# Patient Record
Sex: MALE | Race: WHITE | NOT HISPANIC OR LATINO | Employment: UNEMPLOYED | URBAN - METROPOLITAN AREA
[De-identification: names, ages, dates, MRNs, and addresses within clinical notes are randomized per-mention and may not be internally consistent; named-entity substitution may affect disease eponyms.]

---

## 2017-01-23 ENCOUNTER — COMMUNICATION - HEALTHEAST (OUTPATIENT)
Dept: FAMILY MEDICINE | Facility: CLINIC | Age: 52
End: 2017-01-23

## 2017-01-23 DIAGNOSIS — E11.9 TYPE 2 DIABETES MELLITUS (H): ICD-10-CM

## 2017-02-02 ENCOUNTER — AMBULATORY - HEALTHEAST (OUTPATIENT)
Dept: FAMILY MEDICINE | Facility: CLINIC | Age: 52
End: 2017-02-02

## 2017-08-13 ENCOUNTER — COMMUNICATION - HEALTHEAST (OUTPATIENT)
Dept: FAMILY MEDICINE | Facility: CLINIC | Age: 52
End: 2017-08-13

## 2017-08-13 DIAGNOSIS — I10 UNSPECIFIED ESSENTIAL HYPERTENSION: ICD-10-CM

## 2017-08-13 DIAGNOSIS — E11.9 TYPE 2 DIABETES MELLITUS (H): ICD-10-CM

## 2017-09-06 ENCOUNTER — COMMUNICATION - HEALTHEAST (OUTPATIENT)
Dept: FAMILY MEDICINE | Facility: CLINIC | Age: 52
End: 2017-09-06

## 2017-09-06 ENCOUNTER — AMBULATORY - HEALTHEAST (OUTPATIENT)
Dept: FAMILY MEDICINE | Facility: CLINIC | Age: 52
End: 2017-09-06

## 2017-09-06 DIAGNOSIS — E11.9 TYPE 2 DIABETES MELLITUS (H): ICD-10-CM

## 2017-09-17 ENCOUNTER — COMMUNICATION - HEALTHEAST (OUTPATIENT)
Dept: FAMILY MEDICINE | Facility: CLINIC | Age: 52
End: 2017-09-17

## 2017-09-17 DIAGNOSIS — I10 UNSPECIFIED ESSENTIAL HYPERTENSION: ICD-10-CM

## 2017-09-17 DIAGNOSIS — E11.9 TYPE 2 DIABETES MELLITUS (H): ICD-10-CM

## 2017-09-30 ENCOUNTER — COMMUNICATION - HEALTHEAST (OUTPATIENT)
Dept: FAMILY MEDICINE | Facility: CLINIC | Age: 52
End: 2017-09-30

## 2017-09-30 DIAGNOSIS — E11.9 DIABETES MELLITUS (H): ICD-10-CM

## 2017-10-24 ENCOUNTER — COMMUNICATION - HEALTHEAST (OUTPATIENT)
Dept: FAMILY MEDICINE | Facility: CLINIC | Age: 52
End: 2017-10-24

## 2017-10-24 DIAGNOSIS — E11.9 TYPE 2 DIABETES MELLITUS (H): ICD-10-CM

## 2017-11-03 ENCOUNTER — COMMUNICATION - HEALTHEAST (OUTPATIENT)
Dept: FAMILY MEDICINE | Facility: CLINIC | Age: 52
End: 2017-11-03

## 2017-11-03 DIAGNOSIS — E11.9 DIABETES MELLITUS (H): ICD-10-CM

## 2017-11-15 ENCOUNTER — COMMUNICATION - HEALTHEAST (OUTPATIENT)
Dept: FAMILY MEDICINE | Facility: CLINIC | Age: 52
End: 2017-11-15

## 2017-11-22 ENCOUNTER — COMMUNICATION - HEALTHEAST (OUTPATIENT)
Dept: FAMILY MEDICINE | Facility: CLINIC | Age: 52
End: 2017-11-22

## 2017-11-22 DIAGNOSIS — I10 ESSENTIAL HYPERTENSION: ICD-10-CM

## 2017-11-22 DIAGNOSIS — E11.9 TYPE 2 DIABETES MELLITUS (H): ICD-10-CM

## 2017-11-22 DIAGNOSIS — E11.9 DIABETES MELLITUS (H): ICD-10-CM

## 2017-11-24 ENCOUNTER — COMMUNICATION - HEALTHEAST (OUTPATIENT)
Dept: FAMILY MEDICINE | Facility: CLINIC | Age: 52
End: 2017-11-24

## 2017-11-24 DIAGNOSIS — E11.9 DIABETES MELLITUS (H): ICD-10-CM

## 2018-01-03 ENCOUNTER — OFFICE VISIT - HEALTHEAST (OUTPATIENT)
Dept: FAMILY MEDICINE | Facility: CLINIC | Age: 53
End: 2018-01-03

## 2018-01-03 DIAGNOSIS — J20.9 ACUTE BRONCHITIS: ICD-10-CM

## 2018-01-03 DIAGNOSIS — E11.9 TYPE 2 DIABETES MELLITUS (H): ICD-10-CM

## 2018-01-03 DIAGNOSIS — M79.606 LEG PAIN: ICD-10-CM

## 2018-01-03 LAB
ANION GAP SERPL CALCULATED.3IONS-SCNC: 14 MMOL/L (ref 5–18)
BUN SERPL-MCNC: 14 MG/DL (ref 8–22)
CALCIUM SERPL-MCNC: 9.7 MG/DL (ref 8.5–10.5)
CHLORIDE BLD-SCNC: 103 MMOL/L (ref 98–107)
CO2 SERPL-SCNC: 17 MMOL/L (ref 22–31)
CREAT SERPL-MCNC: 1.09 MG/DL (ref 0.7–1.3)
GFR SERPL CREATININE-BSD FRML MDRD: >60 ML/MIN/1.73M2
GLUCOSE BLD-MCNC: 311 MG/DL (ref 70–125)
HBA1C MFR BLD: 11.5 % (ref 3.5–6)
POTASSIUM BLD-SCNC: 4.8 MMOL/L (ref 3.5–5)
SODIUM SERPL-SCNC: 134 MMOL/L (ref 136–145)

## 2018-01-03 ASSESSMENT — MIFFLIN-ST. JEOR: SCORE: 1878.39

## 2018-01-04 ENCOUNTER — COMMUNICATION - HEALTHEAST (OUTPATIENT)
Dept: FAMILY MEDICINE | Facility: CLINIC | Age: 53
End: 2018-01-04

## 2018-01-04 DIAGNOSIS — E11.9 TYPE 2 DIABETES MELLITUS (H): ICD-10-CM

## 2018-01-04 DIAGNOSIS — E11.9 DIABETES MELLITUS (H): ICD-10-CM

## 2018-01-04 DIAGNOSIS — I10 ESSENTIAL HYPERTENSION: ICD-10-CM

## 2018-02-28 ENCOUNTER — COMMUNICATION - HEALTHEAST (OUTPATIENT)
Dept: FAMILY MEDICINE | Facility: CLINIC | Age: 53
End: 2018-02-28

## 2018-02-28 DIAGNOSIS — I10 ESSENTIAL HYPERTENSION: ICD-10-CM

## 2018-03-11 ENCOUNTER — COMMUNICATION - HEALTHEAST (OUTPATIENT)
Dept: FAMILY MEDICINE | Facility: CLINIC | Age: 53
End: 2018-03-11

## 2018-03-11 DIAGNOSIS — E78.5 HYPERLIPIDEMIA: ICD-10-CM

## 2019-01-09 ENCOUNTER — COMMUNICATION - HEALTHEAST (OUTPATIENT)
Dept: FAMILY MEDICINE | Facility: CLINIC | Age: 54
End: 2019-01-09

## 2019-02-20 ENCOUNTER — COMMUNICATION - HEALTHEAST (OUTPATIENT)
Dept: FAMILY MEDICINE | Facility: CLINIC | Age: 54
End: 2019-02-20

## 2019-02-20 DIAGNOSIS — E11.9 DIABETES MELLITUS (H): ICD-10-CM

## 2019-02-20 DIAGNOSIS — E11.9 TYPE 2 DIABETES MELLITUS (H): ICD-10-CM

## 2019-04-02 ENCOUNTER — COMMUNICATION - HEALTHEAST (OUTPATIENT)
Dept: FAMILY MEDICINE | Facility: CLINIC | Age: 54
End: 2019-04-02

## 2019-04-02 DIAGNOSIS — I10 ESSENTIAL HYPERTENSION: ICD-10-CM

## 2019-04-02 DIAGNOSIS — E11.9 DIABETES MELLITUS (H): ICD-10-CM

## 2019-04-02 DIAGNOSIS — E11.9 TYPE 2 DIABETES MELLITUS (H): ICD-10-CM

## 2019-04-04 ENCOUNTER — OFFICE VISIT - HEALTHEAST (OUTPATIENT)
Dept: FAMILY MEDICINE | Facility: CLINIC | Age: 54
End: 2019-04-04

## 2019-04-04 DIAGNOSIS — Z12.11 SCREEN FOR COLON CANCER: ICD-10-CM

## 2019-04-04 DIAGNOSIS — R45.4 IRRITABILITY AND ANGER: ICD-10-CM

## 2019-04-04 DIAGNOSIS — N52.9 ERECTILE DYSFUNCTION, UNSPECIFIED ERECTILE DYSFUNCTION TYPE: ICD-10-CM

## 2019-04-04 DIAGNOSIS — E78.5 HYPERLIPIDEMIA: ICD-10-CM

## 2019-04-04 DIAGNOSIS — E11.9 TYPE 2 DIABETES MELLITUS (H): ICD-10-CM

## 2019-04-04 LAB
ALBUMIN SERPL-MCNC: 4 G/DL (ref 3.5–5)
ALP SERPL-CCNC: 93 U/L (ref 45–120)
ALT SERPL W P-5'-P-CCNC: 35 U/L (ref 0–45)
ANION GAP SERPL CALCULATED.3IONS-SCNC: 14 MMOL/L (ref 5–18)
AST SERPL W P-5'-P-CCNC: 15 U/L (ref 0–40)
BILIRUB SERPL-MCNC: 0.5 MG/DL (ref 0–1)
BUN SERPL-MCNC: 28 MG/DL (ref 8–22)
CALCIUM SERPL-MCNC: 10.2 MG/DL (ref 8.5–10.5)
CHLORIDE BLD-SCNC: 100 MMOL/L (ref 98–107)
CO2 SERPL-SCNC: 21 MMOL/L (ref 22–31)
CREAT SERPL-MCNC: 1.51 MG/DL (ref 0.7–1.3)
GFR SERPL CREATININE-BSD FRML MDRD: 48 ML/MIN/1.73M2
GLUCOSE BLD-MCNC: 371 MG/DL (ref 70–125)
HBA1C MFR BLD: 11.6 % (ref 3.5–6)
POTASSIUM BLD-SCNC: 5 MMOL/L (ref 3.5–5)
PROT SERPL-MCNC: 7.3 G/DL (ref 6–8)
SODIUM SERPL-SCNC: 135 MMOL/L (ref 136–145)

## 2019-04-04 ASSESSMENT — MIFFLIN-ST. JEOR: SCORE: 1878.39

## 2019-04-05 ENCOUNTER — COMMUNICATION - HEALTHEAST (OUTPATIENT)
Dept: FAMILY MEDICINE | Facility: CLINIC | Age: 54
End: 2019-04-05

## 2019-04-05 LAB
CREAT UR-MCNC: 157.6 MG/DL
LDLC SERPL CALC-MCNC: 182 MG/DL
MICROALBUMIN UR-MCNC: 8.37 MG/DL (ref 0–1.99)
MICROALBUMIN/CREAT UR: 53.1 MG/G

## 2019-04-15 ENCOUNTER — COMMUNICATION - HEALTHEAST (OUTPATIENT)
Dept: FAMILY MEDICINE | Facility: CLINIC | Age: 54
End: 2019-04-15

## 2019-05-15 ENCOUNTER — COMMUNICATION - HEALTHEAST (OUTPATIENT)
Dept: FAMILY MEDICINE | Facility: CLINIC | Age: 54
End: 2019-05-15

## 2019-05-15 DIAGNOSIS — I10 ESSENTIAL HYPERTENSION: ICD-10-CM

## 2019-05-15 DIAGNOSIS — E78.5 HYPERLIPIDEMIA: ICD-10-CM

## 2019-05-15 DIAGNOSIS — E11.9 TYPE 2 DIABETES MELLITUS (H): ICD-10-CM

## 2019-05-15 DIAGNOSIS — E11.9 DIABETES MELLITUS (H): ICD-10-CM

## 2020-02-17 ENCOUNTER — COMMUNICATION - HEALTHEAST (OUTPATIENT)
Dept: FAMILY MEDICINE | Facility: CLINIC | Age: 55
End: 2020-02-17

## 2020-02-17 DIAGNOSIS — N52.9 ERECTILE DYSFUNCTION, UNSPECIFIED ERECTILE DYSFUNCTION TYPE: ICD-10-CM

## 2020-07-13 ENCOUNTER — COMMUNICATION - HEALTHEAST (OUTPATIENT)
Dept: FAMILY MEDICINE | Facility: CLINIC | Age: 55
End: 2020-07-13

## 2020-07-13 DIAGNOSIS — E11.9 TYPE 2 DIABETES MELLITUS (H): ICD-10-CM

## 2020-07-13 DIAGNOSIS — E11.9 DIABETES MELLITUS (H): ICD-10-CM

## 2020-07-13 DIAGNOSIS — E78.5 HYPERLIPIDEMIA: ICD-10-CM

## 2020-07-15 ENCOUNTER — COMMUNICATION - HEALTHEAST (OUTPATIENT)
Dept: FAMILY MEDICINE | Facility: CLINIC | Age: 55
End: 2020-07-15

## 2020-09-11 ENCOUNTER — AMBULATORY - HEALTHEAST (OUTPATIENT)
Dept: FAMILY MEDICINE | Facility: CLINIC | Age: 55
End: 2020-09-11

## 2020-09-11 ENCOUNTER — COMMUNICATION - HEALTHEAST (OUTPATIENT)
Dept: FAMILY MEDICINE | Facility: CLINIC | Age: 55
End: 2020-09-11

## 2020-09-11 DIAGNOSIS — E11.9 DIABETES MELLITUS (H): ICD-10-CM

## 2020-09-11 DIAGNOSIS — N52.9 ERECTILE DYSFUNCTION, UNSPECIFIED ERECTILE DYSFUNCTION TYPE: ICD-10-CM

## 2020-09-11 DIAGNOSIS — I10 ESSENTIAL HYPERTENSION: ICD-10-CM

## 2020-09-11 DIAGNOSIS — E11.9 TYPE 2 DIABETES MELLITUS (H): ICD-10-CM

## 2020-09-11 DIAGNOSIS — E78.5 HYPERLIPIDEMIA: ICD-10-CM

## 2020-11-09 ENCOUNTER — OFFICE VISIT - HEALTHEAST (OUTPATIENT)
Dept: FAMILY MEDICINE | Facility: CLINIC | Age: 55
End: 2020-11-09

## 2020-11-09 ENCOUNTER — COMMUNICATION - HEALTHEAST (OUTPATIENT)
Dept: SCHEDULING | Facility: CLINIC | Age: 55
End: 2020-11-09

## 2020-11-09 DIAGNOSIS — N52.9 ERECTILE DYSFUNCTION, UNSPECIFIED ERECTILE DYSFUNCTION TYPE: ICD-10-CM

## 2020-11-09 DIAGNOSIS — G89.29 CHRONIC PAIN OF LEFT KNEE: ICD-10-CM

## 2020-11-09 DIAGNOSIS — E11.65 TYPE 2 DIABETES MELLITUS WITH HYPERGLYCEMIA, WITHOUT LONG-TERM CURRENT USE OF INSULIN (H): ICD-10-CM

## 2020-11-09 DIAGNOSIS — M25.562 CHRONIC PAIN OF LEFT KNEE: ICD-10-CM

## 2020-11-09 DIAGNOSIS — E78.5 HYPERLIPIDEMIA, UNSPECIFIED HYPERLIPIDEMIA TYPE: ICD-10-CM

## 2020-11-09 DIAGNOSIS — I10 ESSENTIAL HYPERTENSION: ICD-10-CM

## 2020-11-09 LAB
ALBUMIN SERPL-MCNC: 3.9 G/DL (ref 3.5–5)
ALP SERPL-CCNC: 120 U/L (ref 45–120)
ALT SERPL W P-5'-P-CCNC: 28 U/L (ref 0–45)
ANION GAP SERPL CALCULATED.3IONS-SCNC: 15 MMOL/L (ref 5–18)
AST SERPL W P-5'-P-CCNC: 26 U/L (ref 0–40)
BILIRUB SERPL-MCNC: 0.6 MG/DL (ref 0–1)
BUN SERPL-MCNC: 18 MG/DL (ref 8–22)
CALCIUM SERPL-MCNC: 9.1 MG/DL (ref 8.5–10.5)
CHLORIDE BLD-SCNC: 100 MMOL/L (ref 98–107)
CO2 SERPL-SCNC: 17 MMOL/L (ref 22–31)
CREAT SERPL-MCNC: 1.44 MG/DL (ref 0.7–1.3)
CREAT UR-MCNC: 76 MG/DL
GFR SERPL CREATININE-BSD FRML MDRD: 51 ML/MIN/1.73M2
GLUCOSE BLD-MCNC: 462 MG/DL (ref 70–125)
HBA1C MFR BLD: >14 %
MICROALBUMIN UR-MCNC: 3.53 MG/DL (ref 0–1.99)
MICROALBUMIN/CREAT UR: 46.4 MG/G
POTASSIUM BLD-SCNC: 5 MMOL/L (ref 3.5–5)
PROT SERPL-MCNC: 7.2 G/DL (ref 6–8)
SODIUM SERPL-SCNC: 132 MMOL/L (ref 136–145)

## 2020-11-09 ASSESSMENT — MIFFLIN-ST. JEOR: SCORE: 1760.46

## 2020-11-09 ASSESSMENT — ANXIETY QUESTIONNAIRES
3. WORRYING TOO MUCH ABOUT DIFFERENT THINGS: SEVERAL DAYS
4. TROUBLE RELAXING: NOT AT ALL
6. BECOMING EASILY ANNOYED OR IRRITABLE: SEVERAL DAYS
1. FEELING NERVOUS, ANXIOUS, OR ON EDGE: NOT AT ALL
GAD7 TOTAL SCORE: 4
5. BEING SO RESTLESS THAT IT IS HARD TO SIT STILL: SEVERAL DAYS
7. FEELING AFRAID AS IF SOMETHING AWFUL MIGHT HAPPEN: NOT AT ALL
2. NOT BEING ABLE TO STOP OR CONTROL WORRYING: SEVERAL DAYS

## 2020-11-09 ASSESSMENT — PATIENT HEALTH QUESTIONNAIRE - PHQ9: SUM OF ALL RESPONSES TO PHQ QUESTIONS 1-9: 9

## 2020-11-10 ENCOUNTER — COMMUNICATION - HEALTHEAST (OUTPATIENT)
Dept: NURSING | Facility: CLINIC | Age: 55
End: 2020-11-10

## 2020-11-11 ENCOUNTER — COMMUNICATION - HEALTHEAST (OUTPATIENT)
Dept: FAMILY MEDICINE | Facility: CLINIC | Age: 55
End: 2020-11-11

## 2020-11-25 ENCOUNTER — COMMUNICATION - HEALTHEAST (OUTPATIENT)
Dept: FAMILY MEDICINE | Facility: CLINIC | Age: 55
End: 2020-11-25

## 2020-11-25 DIAGNOSIS — E11.9 DIABETES MELLITUS (H): ICD-10-CM

## 2020-11-25 DIAGNOSIS — E11.9 TYPE 2 DIABETES MELLITUS (H): ICD-10-CM

## 2020-11-26 RX ORDER — METFORMIN HCL 500 MG
2000 TABLET, EXTENDED RELEASE 24 HR ORAL
Qty: 360 TABLET | Refills: 1 | Status: SHIPPED | OUTPATIENT
Start: 2020-11-26 | End: 2021-08-23

## 2020-11-26 RX ORDER — GLIPIZIDE 10 MG/1
10 TABLET, FILM COATED, EXTENDED RELEASE ORAL 2 TIMES DAILY
Qty: 180 TABLET | Refills: 1 | Status: SHIPPED | OUTPATIENT
Start: 2020-11-26 | End: 2021-09-14

## 2020-12-01 ENCOUNTER — COMMUNICATION - HEALTHEAST (OUTPATIENT)
Dept: NURSING | Facility: CLINIC | Age: 55
End: 2020-12-01

## 2021-02-27 ENCOUNTER — COMMUNICATION - HEALTHEAST (OUTPATIENT)
Dept: FAMILY MEDICINE | Facility: CLINIC | Age: 56
End: 2021-02-27

## 2021-02-27 DIAGNOSIS — F32.1 MAJOR DEPRESSIVE DISORDER, SINGLE EPISODE, MODERATE (H): ICD-10-CM

## 2021-03-09 RX ORDER — BUPROPION HYDROCHLORIDE 150 MG/1
150 TABLET ORAL EVERY MORNING
Qty: 30 TABLET | Refills: 1 | Status: SHIPPED | OUTPATIENT
Start: 2021-03-09 | End: 2021-07-27

## 2021-03-26 ENCOUNTER — COMMUNICATION - HEALTHEAST (OUTPATIENT)
Dept: FAMILY MEDICINE | Facility: CLINIC | Age: 56
End: 2021-03-26

## 2021-03-26 DIAGNOSIS — E11.9 TYPE 2 DIABETES MELLITUS (H): ICD-10-CM

## 2021-03-26 DIAGNOSIS — I10 ESSENTIAL HYPERTENSION: ICD-10-CM

## 2021-03-28 RX ORDER — LISINOPRIL 10 MG/1
10 TABLET ORAL DAILY
Qty: 90 TABLET | Refills: 2 | Status: SHIPPED | OUTPATIENT
Start: 2021-03-28 | End: 2022-02-07

## 2021-03-28 RX ORDER — PIOGLITAZONEHYDROCHLORIDE 30 MG/1
15 TABLET ORAL DAILY
Qty: 90 TABLET | Refills: 2 | Status: SHIPPED | OUTPATIENT
Start: 2021-03-28 | End: 2022-02-07

## 2021-05-12 ENCOUNTER — COMMUNICATION - HEALTHEAST (OUTPATIENT)
Dept: FAMILY MEDICINE | Facility: CLINIC | Age: 56
End: 2021-05-12

## 2021-05-12 DIAGNOSIS — N52.9 ERECTILE DYSFUNCTION, UNSPECIFIED ERECTILE DYSFUNCTION TYPE: ICD-10-CM

## 2021-05-12 DIAGNOSIS — E78.5 HYPERLIPIDEMIA: ICD-10-CM

## 2021-05-13 RX ORDER — SILDENAFIL 50 MG/1
50 TABLET, FILM COATED ORAL PRN
Qty: 20 TABLET | Refills: 11 | Status: SHIPPED | OUTPATIENT
Start: 2021-05-13 | End: 2024-06-23

## 2021-05-13 RX ORDER — ATORVASTATIN CALCIUM 80 MG/1
80 TABLET, FILM COATED ORAL AT BEDTIME
Qty: 90 TABLET | Refills: 1 | Status: SHIPPED | OUTPATIENT
Start: 2021-05-13 | End: 2022-04-20

## 2021-05-27 ASSESSMENT — PATIENT HEALTH QUESTIONNAIRE - PHQ9: SUM OF ALL RESPONSES TO PHQ QUESTIONS 1-9: 9

## 2021-05-27 NOTE — PROGRESS NOTES
Assessment/Plan:     1. Type 2 diabetes mellitus (H)  Adequate control.  He remains compliant with his medications.  No low blood sugars.  Advised importance of carbohydrate counting and checking her sugars that he can get in better tune with what his sugars are doing.  Encourage increase in physical activity.  Referral made to [facility] pharmacist for consideration of GLP-1 inhibito or SGLT-2 though as a  this may be disruptive.  I prefer that he not be taking pioglitazone in particular.  Will await urine microalbumin.  Will check a direct LDL as he is not fasting today.  Referral placed for diabetes education.  Referral placed for diabetic screening eye exam with ophthalmology.  Discussed importance of tobacco cessation, he declines assistance.  Follow-up 3 months, follow-up with [facility] pharmacist in the meantime.  - Glycosylated Hemoglobin A1c; Standing  - Microalbumin, Random Urine  - Glycosylated Hemoglobin A1c  - Comprehensive Metabolic Panel  - LDL Cholesterol, Direct  - Ambulatory referral to Medication Management  - Ambulatory referral to Diabetic Education Program  - Ambulatory referral to Ophthalmology    2. Hyperlipidemia  Encouraged healthy lifestyle habits.  Continue atorvastatin.  Will check direct LDL as he is not fasting today.  - LDL Cholesterol, Direct    3. Screen for colon cancer  Discussed colon cancer screening options.  Will check intraoperative Cologuard, orders placed.  - Cologuard    4. Erectile dysfunction, unspecified erectile dysfunction type  Discussed importance of management of diabetes and blood pressure and reducing risk of erectile dysfunction.  Discussed emotional aspects as well that may be contributing.  We will do trial of Viagra, prescription sent.  - sildenafil (VIAGRA) 50 MG tablet; Take 1 tablet (50 mg total) by mouth as needed for erectile dysfunction.  Dispense: 20 tablet; Refill: 1    5. Irritability and anger  Does not meet criteria for diagnosis of depression  or anxiety disorder, but certainly borderline for this.  We discussed option of sertraline trial, he will consider.  Discussed importance of regular physical activity and healthy lifestyle habits.      Patient Instructions   Begin checking your sugars twice daily-- before and 2 hours after meals.       Return in about 3 months (around 7/4/2019) for Diabetes.      Subjective:      Jt Venegas is a 53 y.o. male to clinic today for follow-up of his type 2 diabetes.  He remains compliant with glipizide, metformin, and pioglitazone at current doses, denies any missed doses.  Has not been checking his sugars.  He does not count his carbohydrates overall he feels like he is reduce some of this is since previous.  He denies any sense of low sugars.  Remains on aspirin and statin.  He continues to smoke, is not interested in quitting at this time.  Is walking a bit more most days but limited to some extent due to weather.  His meals are regular.  Feels he is sleeping well.  Denies lightheadedness, dizziness, headaches, chest pain, dyspnea, or swelling.  Tolerating blood pressure medication well.    Noting some increasing worry overall.  Wife, who is here with him, states is been much more irritable and that he is angry more easily.  Feels that he is not finding enjoyment in things are looking for things.  Patient does not feel he is depressed.  Also noting difficulty with erectile dysfunction, wondering about trial of Viagra.  Agreeable to colon cancer screening.  Agreeable to tetanus booster today.    Current Outpatient Medications   Medication Sig Dispense Refill     aspirin 81 MG EC tablet Take 81 mg by mouth daily.       atorvastatin (LIPITOR) 80 MG tablet Take 1 tablet (80 mg total) by mouth at bedtime. 90 tablet 3     blood-glucose meter Misc Use 1 Device As Directed 2 (two) times a day. 1 each 0     glipiZIDE (GLUCOTROL XL) 10 MG 24 hr tablet Take 1 tablet (10 mg total) by mouth 2 (two) times a day. 60 tablet 0      lisinopril (PRINIVIL,ZESTRIL) 10 MG tablet Take 1 tablet (10 mg total) by mouth daily. 30 tablet 0     metFORMIN (GLUCOPHAGE-XR) 500 MG 24 hr tablet TAKE FOUR TABLETS BY MOUTH EVERY DAY WITH SUPPER 30 tablet 0     metFORMIN (GLUCOPHAGE-XR) 500 MG 24 hr tablet Take 4 tablets (2,000 mg total) by mouth daily with supper. 120 tablet 0     [place], American ginsg/B12/royl (GINSENG COMPLEX ORAL) Take by mouth.       pioglitazone (ACTOS) 30 MG tablet Take 0.5 tablets (15 mg total) by mouth daily. 15 tablet 0     TRUETRACK TEST strips TEST BLOOD SUGAR TWICE DAILY OR AS DIRECTED. 200 strip 0     aspirin 81 mg chewable tablet Chew 81 mg daily.       sildenafil (VIAGRA) 50 MG tablet Take 1 tablet (50 mg total) by mouth as needed for erectile dysfunction. 20 tablet 1     No current facility-administered medications for this visit.        Past Medical History, Family History, and Social History reviewed.  No past medical history on file.  No past surgical history on file.  Aspirin (tartrazine only) and Hydrochlorothiazide  No family history on file.  Social History     Socioeconomic History     Marital status:      Spouse name: Not on file     Number of children: Not on file     Years of education: Not on file     Highest education level: Not on file   Occupational History     Not on file   Social Needs     Financial resource strain: Not on file     Food insecurity:     Worry: Not on file     Inability: Not on file     Transportation needs:     Medical: Not on file     Non-medical: Not on file   Tobacco Use     Smoking status: Current Every Day Smoker     Smokeless tobacco: Never Used   Substance and Sexual Activity     Alcohol use: Not on file     Drug use: Not on file     Sexual activity: Not on file   Lifestyle     Physical activity:     Days per week: Not on file     Minutes per session: Not on file     Stress: Not on file   Relationships     Social connections:     Talks on phone: Not on file     Gets together: Not  "on file     Attends Voodoo service: Not on file     Active member of club or organization: Not on file     Attends meetings of clubs or organizations: Not on file     Relationship status: Not on file     Intimate partner violence:     Fear of current or ex partner: Not on file     Emotionally abused: Not on file     Physically abused: Not on file     Forced sexual activity: Not on file   Other Topics Concern     Not on file   Social History Narrative     Not on file         Review of systems is as stated in HPI, and the remainder of the 10 system review is otherwise negative.    Objective:     Vitals:    04/04/19 0724 04/04/19 0733   BP: 100/80 126/70   Patient Site:  Right Arm   Patient Position:  Sitting   Pulse: 78    Resp: 20    Temp: 97.8  F (36.6  C)    TempSrc: Oral    SpO2: 97%    Weight: (!) 228 lb (103.4 kg)    Height: 5' 10.5\" (1.791 m)     Body mass index is 32.25 kg/m .    .  Mucous membranes moist.  Neck supple without lymphadenopathy.  Heart with regular rate and rhythm.  Lungs clear.  Normal foot exam.  Skin with patches of erythema all less than with anemia primarily on trunk.      This note has been dictated using voice recognition software. Any grammatical or context distortions are unintentional and inherent to the the software.   "

## 2021-05-27 NOTE — TELEPHONE ENCOUNTER
Please call pt.  Meds refilled for one month only.  OV for diabetes follow-up needed for further refills.  If continued insurance struggles, I would like him to enroll in our Care Coordination program to assist with establishing insurance so that we can meet his medical needs.  I cannot in good naomi treat his diabetes without evaluating him in clinic and performing labs as it is not good practice.

## 2021-05-27 NOTE — TELEPHONE ENCOUNTER
----- Message from Kylie Armijo MD sent at 4/5/2019 10:01 AM CDT -----  Please call patient.  His kidney function has worsened a bit from last time.  It is not a severe or highly concerning change, but it is a change.  This likely is stemming from the ongoing high blood sugars.  This does not require any intervention at this time other than ensuring he is drinking adequate fluids and continuing to work on better control of his blood sugars.  I like to recheck this in about a month.  This may also be checked at his pharmacist visit, depending on that timing.  His cholesterol has also worsened.  I would like to check a complete fasting cholesterol panel at his next visit, so I like him to come in fasting.  He should continue atorvastatin.

## 2021-05-27 NOTE — TELEPHONE ENCOUNTER
RN cannot approve Refill Request    RN can NOT refill this medication Protocol failed and NO refill given.       Ifeoma Crump, [facility] Triage/Med Refill 4/2/2019    Requested Prescriptions   Pending Prescriptions Disp Refills     pioglitazone (ACTOS) 30 MG tablet 15 tablet 0     Sig: Take 0.5 tablets (15 mg total) by mouth daily.    Pioglitazone Protocol Failed - 4/2/2019 10:11 AM       Failed - Blood pressure in last 12 months    BP Readings from Last 1 Encounters:   01/03/18 130/84            Failed - LFT or AST or ALT in last 12 months    Albumin   Date Value Ref Range Status   06/13/2016 4.1 3.5 - 5.0 g/dL Final     Bilirubin, Total   Date Value Ref Range Status   06/13/2016 0.8 0.0 - 1.0 mg/dL Final     Bilirubin, Direct   Date Value Ref Range Status   01/04/2013 <0.1 <0.6 mg/dL Final     Alkaline Phosphatase   Date Value Ref Range Status   06/13/2016 90 45 - 120 U/L Final     AST   Date Value Ref Range Status   06/13/2016 22 0 - 40 U/L Final     ALT   Date Value Ref Range Status   06/13/2016 34 0 - 45 U/L Final     Protein, Total   Date Value Ref Range Status   06/13/2016 7.4 6.0 - 8.0 g/dL Final               Failed - Visit with PCP or prescribing provider visit in last 6 months     Last office visit with prescriber/PCP: Visit date not found OR same dept: Visit date not found OR same specialty: 1/3/2018 Kylie Armijo MD Last physical: Visit date not found Last MTM visit: Visit date not found         Next appt within 3 mo: Visit date not found  Next physical within 3 mo: Visit date not found  Prescriber OR PCP: Kylie Armijo MD  Last diagnosis associated with med order: 1. Essential hypertension  - lisinopril (PRINIVIL,ZESTRIL) 10 MG tablet; Take 1 tablet (10 mg total) by mouth daily.  Dispense: 30 tablet; Refill: 9     If protocol passes may refill for 12 months if within 3 months of last provider visit (or a total of 15 months).          Failed - A1C in last 6 months    Hemoglobin A1c    Date Value Ref Range Status   01/03/2018 11.5 (H) 3.5 - 6.0 % Final              Failed - Microalbumin in last year    Microalbumin, Random Urine   Date Value Ref Range Status   06/13/2016 7.63 (H) 0.00 - 1.99 mg/dL Final                 Failed - Serum creatinine in last year    Creatinine   Date Value Ref Range Status   01/03/2018 1.09 0.70 - 1.30 mg/dL Final             lisinopril (PRINIVIL,ZESTRIL) 10 MG tablet 30 tablet 9     Sig: Take 1 tablet (10 mg total) by mouth daily.    Ace Inhibitors Refill Protocol Failed - 4/2/2019 10:11 AM       Failed - PCP or prescribing provider visit in past 12 months      Last office visit with prescriber/PCP: 1/3/2018 Kylie Armijo MD OR same dept: Visit date not found OR same specialty: 1/3/2018 Kylie Armijo MD  Last physical: 6/13/2016 Last MTM visit: Visit date not found   Next visit within 3 mo: Visit date not found  Next physical within 3 mo: Visit date not found  Prescriber OR PCP: Kylie Armijo MD  Last diagnosis associated with med order: 1. Essential hypertension  - lisinopril (PRINIVIL,ZESTRIL) 10 MG tablet; Take 1 tablet (10 mg total) by mouth daily.  Dispense: 30 tablet; Refill: 9    If protocol passes may refill for 12 months if within 3 months of last provider visit (or a total of 15 months).            Failed - Serum Potassium in past 12 months    No results found for: LN-POTASSIUM         Failed - Blood pressure filed in past 12 months    BP Readings from Last 1 Encounters:   01/03/18 130/84            Failed - Serum Creatinine in past 12 months    Creatinine   Date Value Ref Range Status   01/03/2018 1.09 0.70 - 1.30 mg/dL Final

## 2021-05-27 NOTE — TELEPHONE ENCOUNTER
Left message to call back for: results   Information to relay to patient:  Below message    Result letter was mailed to patient.   Closing encounter as he has not called back in regards to results.

## 2021-05-28 ASSESSMENT — ANXIETY QUESTIONNAIRES: GAD7 TOTAL SCORE: 4

## 2021-05-28 NOTE — TELEPHONE ENCOUNTER
Refill Request  Did you contact pharmacy: Yes.  Date: per caller, 2 weeks ago  Medication name:   Requested Prescriptions     Pending Prescriptions Disp Refills     lisinopril (PRINIVIL,ZESTRIL) 10 MG tablet 30 tablet 0     Sig: Take 1 tablet (10 mg total) by mouth daily.     glipiZIDE (GLUCOTROL XL) 10 MG 24 hr tablet 60 tablet 0     Sig: Take 1 tablet (10 mg total) by mouth 2 (two) times a day.     atorvastatin (LIPITOR) 80 MG tablet 90 tablet 3     Sig: Take 1 tablet (80 mg total) by mouth at bedtime.     pioglitazone (ACTOS) 30 MG tablet 15 tablet 0     Sig: Take 0.5 tablets (15 mg total) by mouth daily.     metFORMIN (GLUCOPHAGE-XR) 500 MG 24 hr tablet 120 tablet 0     Sig: Take 4 tablets (2,000 mg total) by mouth daily with supper.     Who prescribed the medication: Kylie Armijo MD   Pharm[facility] Name and Location: [facility]  Is patient out of medication: Yes  Patient notified refills processed in 72 hours:  yes  Okay to leave a detailed message: no    Patient's wife, Kassie, stated she is aware the patient is due for an appointment. Caller stated they do not have insurance yet and they are waiting until 6/1, for the insurance to kick in since patient just started a new job this month. Caller will call back to schedule patient an appointment.

## 2021-05-28 NOTE — TELEPHONE ENCOUNTER
RN cannot approve Refill Request    RN can NOT refill this medication PCP messaged that patient is overdue for Labs and Office Visit. See pt note regarding no insurance for appt- PCP to review    LOV 4/4/2019: test results, unable to reach by phone, letter sent:   Please call patient.  His kidney function has worsened a bit from last time.  It is not a severe or highly concerning change, but it is a change.  This likely is stemming from the ongoing high blood sugars.  This does not require any intervention at this time other than ensuring he is drinking adequate fluids and continuing to work on better control of his blood sugars.  I like to recheck this in about a month.  This may also be checked at his pharmacist visit, depending on that timing.  His cholesterol has also worsened.  I would like to check a complete fasting cholesterol panel at his next visit, so I like him to come in fasting.  He should continue atorvastatin.    Last office visit: 4/4/2019 Kylie Armijo MD Last Physical: 6/13/2016 Last MTM visit: Visit date not found Last visit same specialty: 4/4/2019 Kylie Armijo MD.  Next visit within 3 mo: Visit date not found  Next physical within 3 mo: Visit date not found      Constantin Chauhan, [facility] Triage/Med Refill 5/15/2019    Requested Prescriptions   Pending Prescriptions Disp Refills     lisinopril (PRINIVIL,ZESTRIL) 10 MG tablet 30 tablet 0     Sig: Take 1 tablet (10 mg total) by mouth daily.       Ace Inhibitors Refill Protocol Passed - 5/15/2019 10:56 AM        Passed - PCP or prescribing provider visit in past 12 months       Last office visit with prescriber/PCP: 4/4/2019 Kylie Armijo MD OR same dept: 4/4/2019 Kylie Armijo MD OR same specialty: 4/4/2019 Kylie Armijo MD  Last physical: 6/13/2016 Last MTM visit: Visit date not found   Next visit within 3 mo: Visit date not found  Next physical within 3 mo: Visit date not found  Prescriber OR PCP: Kylie QUINONES  MD Zofia  Last diagnosis associated with med order: 1. Essential hypertension  - lisinopril (PRINIVIL,ZESTRIL) 10 MG tablet; Take 1 tablet (10 mg total) by mouth daily.  Dispense: 30 tablet; Refill: 0    2. Type 2 diabetes mellitus (H)  - glipiZIDE (GLUCOTROL XL) 10 MG 24 hr tablet; Take 1 tablet (10 mg total) by mouth 2 (two) times a day.  Dispense: 60 tablet; Refill: 0  - pioglitazone (ACTOS) 30 MG tablet; Take 0.5 tablets (15 mg total) by mouth daily.  Dispense: 15 tablet; Refill: 0    3. Hyperlipidemia  - atorvastatin (LIPITOR) 80 MG tablet; Take 1 tablet (80 mg total) by mouth at bedtime.  Dispense: 90 tablet; Refill: 3    4. Diabetes mellitus (H)  - metFORMIN (GLUCOPHAGE-XR) 500 MG 24 hr tablet; Take 4 tablets (2,000 mg total) by mouth daily with supper.  Dispense: 120 tablet; Refill: 0    If protocol passes may refill for 12 months if within 3 months of last provider visit (or a total of 15 months).             Passed - Serum Potassium in past 12 months     Lab Results   Component Value Date    Potassium 5.0 04/04/2019             Passed - Blood pressure filed in past 12 months     BP Readings from Last 1 Encounters:   04/04/19 126/70             Passed - Serum Creatinine in past 12 months     Creatinine   Date Value Ref Range Status   04/04/2019 1.51 (H) 0.70 - 1.30 mg/dL Final             glipiZIDE (GLUCOTROL XL) 10 MG 24 hr tablet 60 tablet 0     Sig: Take 1 tablet (10 mg total) by mouth 2 (two) times a day.       Oral Hypoglycemics Refill Protocol Passed - 5/15/2019 10:56 AM        Passed - Visit with PCP or prescribing provider visit in last 6 months       Last office visit with prescriber/PCP: 4/4/2019 OR same dept: 4/4/2019 Kylie Armijo MD OR same specialty: 4/4/2019 Kylie Armijo MD Last physical: Visit date not found Last MTM visit: Visit date not found         Next appt within 3 mo: Visit date not found  Next physical within 3 mo: Visit date not found  Prescriber OR PCP: Kylie QUINONES  MD Zofia  Last diagnosis associated with med order: 1. Essential hypertension  - lisinopril (PRINIVIL,ZESTRIL) 10 MG tablet; Take 1 tablet (10 mg total) by mouth daily.  Dispense: 30 tablet; Refill: 0    2. Type 2 diabetes mellitus (H)  - glipiZIDE (GLUCOTROL XL) 10 MG 24 hr tablet; Take 1 tablet (10 mg total) by mouth 2 (two) times a day.  Dispense: 60 tablet; Refill: 0  - pioglitazone (ACTOS) 30 MG tablet; Take 0.5 tablets (15 mg total) by mouth daily.  Dispense: 15 tablet; Refill: 0    3. Hyperlipidemia  - atorvastatin (LIPITOR) 80 MG tablet; Take 1 tablet (80 mg total) by mouth at bedtime.  Dispense: 90 tablet; Refill: 3    4. Diabetes mellitus (H)  - metFORMIN (GLUCOPHAGE-XR) 500 MG 24 hr tablet; Take 4 tablets (2,000 mg total) by mouth daily with supper.  Dispense: 120 tablet; Refill: 0     If protocol passes may refill for 12 months if within 3 months of last provider visit (or a total of 15 months).           Passed - A1C in last 6 months     Hemoglobin A1c   Date Value Ref Range Status   04/04/2019 11.6 (H) 3.5 - 6.0 % Final               Passed - Microalbumin in last year      Microalbumin, Random Urine   Date Value Ref Range Status   04/04/2019 8.37 (H) 0.00 - 1.99 mg/dL Final                  Passed - Blood pressure in last year     BP Readings from Last 1 Encounters:   04/04/19 126/70             Passed - Serum creatinine in last year     Creatinine   Date Value Ref Range Status   04/04/2019 1.51 (H) 0.70 - 1.30 mg/dL Final             atorvastatin (LIPITOR) 80 MG tablet 90 tablet 3     Sig: Take 1 tablet (80 mg total) by mouth at bedtime.       Statins Refill Protocol (Hmg CoA Reductase Inhibitors) Passed - 5/15/2019 10:56 AM        Passed - PCP or prescribing provider visit in past 12 months      Last office visit with prescriber/PCP: 4/4/2019 Kylie Armijo MD OR same dept: 4/4/2019 Kylie Armijo MD OR same specialty: 4/4/2019 Kylie Armijo MD  Last physical: 6/13/2016 Last MTM  visit: Visit date not found   Next visit within 3 mo: Visit date not found  Next physical within 3 mo: Visit date not found  Prescriber OR PCP: Kylie Armijo MD  Last diagnosis associated with med order: 1. Essential hypertension  - lisinopril (PRINIVIL,ZESTRIL) 10 MG tablet; Take 1 tablet (10 mg total) by mouth daily.  Dispense: 30 tablet; Refill: 0    2. Type 2 diabetes mellitus (H)  - glipiZIDE (GLUCOTROL XL) 10 MG 24 hr tablet; Take 1 tablet (10 mg total) by mouth 2 (two) times a day.  Dispense: 60 tablet; Refill: 0  - pioglitazone (ACTOS) 30 MG tablet; Take 0.5 tablets (15 mg total) by mouth daily.  Dispense: 15 tablet; Refill: 0    3. Hyperlipidemia  - atorvastatin (LIPITOR) 80 MG tablet; Take 1 tablet (80 mg total) by mouth at bedtime.  Dispense: 90 tablet; Refill: 3    4. Diabetes mellitus (H)  - metFORMIN (GLUCOPHAGE-XR) 500 MG 24 hr tablet; Take 4 tablets (2,000 mg total) by mouth daily with supper.  Dispense: 120 tablet; Refill: 0    If protocol passes may refill for 12 months if within 3 months of last provider visit (or a total of 15 months).             pioglitazone (ACTOS) 30 MG tablet 15 tablet 0     Sig: Take 0.5 tablets (15 mg total) by mouth daily.       Pioglitazone Protocol Passed - 5/15/2019 10:56 AM        Passed - Blood pressure in last 12 months     BP Readings from Last 1 Encounters:   04/04/19 126/70             Passed - LFT or AST or ALT in last 12 months     Albumin   Date Value Ref Range Status   04/04/2019 4.0 3.5 - 5.0 g/dL Final     Bilirubin, Total   Date Value Ref Range Status   04/04/2019 0.5 0.0 - 1.0 mg/dL Final     Bilirubin, Direct   Date Value Ref Range Status   01/04/2013 <0.1 <0.6 mg/dL Final     Alkaline Phosphatase   Date Value Ref Range Status   04/04/2019 93 45 - 120 U/L Final     AST   Date Value Ref Range Status   04/04/2019 15 0 - 40 U/L Final     ALT   Date Value Ref Range Status   04/04/2019 35 0 - 45 U/L Final     Protein, Total   Date Value Ref Range Status    04/04/2019 7.3 6.0 - 8.0 g/dL Final                Passed - Visit with PCP or prescribing provider visit in last 6 months      Last office visit with prescriber/PCP: 4/4/2019 OR same dept: 4/4/2019 Kylie Armijo MD OR same specialty: 4/4/2019 Kylie Armijo MD Last physical: Visit date not found Last MTM visit: Visit date not found         Next appt within 3 mo: Visit date not found  Next physical within 3 mo: Visit date not found  Prescriber OR PCP: Kylie Armijo MD  Last diagnosis associated with med order: 1. Essential hypertension  - lisinopril (PRINIVIL,ZESTRIL) 10 MG tablet; Take 1 tablet (10 mg total) by mouth daily.  Dispense: 30 tablet; Refill: 0    2. Type 2 diabetes mellitus (H)  - glipiZIDE (GLUCOTROL XL) 10 MG 24 hr tablet; Take 1 tablet (10 mg total) by mouth 2 (two) times a day.  Dispense: 60 tablet; Refill: 0  - pioglitazone (ACTOS) 30 MG tablet; Take 0.5 tablets (15 mg total) by mouth daily.  Dispense: 15 tablet; Refill: 0    3. Hyperlipidemia  - atorvastatin (LIPITOR) 80 MG tablet; Take 1 tablet (80 mg total) by mouth at bedtime.  Dispense: 90 tablet; Refill: 3    4. Diabetes mellitus (H)  - metFORMIN (GLUCOPHAGE-XR) 500 MG 24 hr tablet; Take 4 tablets (2,000 mg total) by mouth daily with supper.  Dispense: 120 tablet; Refill: 0     If protocol passes may refill for 12 months if within 3 months of last provider visit (or a total of 15 months).           Passed - A1C in last 6 months     Hemoglobin A1c   Date Value Ref Range Status   04/04/2019 11.6 (H) 3.5 - 6.0 % Final               Passed - Microalbumin in last year     Microalbumin, Random Urine   Date Value Ref Range Status   04/04/2019 8.37 (H) 0.00 - 1.99 mg/dL Final                  Passed - Serum creatinine in last year     Creatinine   Date Value Ref Range Status   04/04/2019 1.51 (H) 0.70 - 1.30 mg/dL Final             metFORMIN (GLUCOPHAGE-XR) 500 MG 24 hr tablet 120 tablet 0     Sig: Take 4 tablets (2,000 mg total) by  mouth daily with supper.       Metformin Refill Protocol Passed - 5/15/2019 10:56 AM        Passed - Blood pressure in last 12 months     BP Readings from Last 1 Encounters:   04/04/19 126/70             Passed - LFT or AST or ALT in last 12 months     Albumin   Date Value Ref Range Status   04/04/2019 4.0 3.5 - 5.0 g/dL Final     Bilirubin, Total   Date Value Ref Range Status   04/04/2019 0.5 0.0 - 1.0 mg/dL Final     Bilirubin, Direct   Date Value Ref Range Status   01/04/2013 <0.1 <0.6 mg/dL Final     Alkaline Phosphatase   Date Value Ref Range Status   04/04/2019 93 45 - 120 U/L Final     AST   Date Value Ref Range Status   04/04/2019 15 0 - 40 U/L Final     ALT   Date Value Ref Range Status   04/04/2019 35 0 - 45 U/L Final     Protein, Total   Date Value Ref Range Status   04/04/2019 7.3 6.0 - 8.0 g/dL Final                Passed - GFR or Serum Creatinine in last 6 months     GFR MDRD Non Af Amer   Date Value Ref Range Status   04/04/2019 48 (L) >60 mL/min/1.73m2 Final     GFR MDRD Af Amer   Date Value Ref Range Status   04/04/2019 59 (L) >60 mL/min/1.73m2 Final             Passed - Visit with PCP or prescribing provider visit in last 6 months or next 3 months     Last office visit with prescriber/PCP: 4/4/2019 OR same dept: 4/4/2019 Kylie Armijo MD OR same specialty: 4/4/2019 Kylie Armijo MD Last physical: Visit date not found Last MTM visit: Visit date not found         Next appt within 3 mo: Visit date not found  Next physical within 3 mo: Visit date not found  Prescriber OR PCP: Kylie Armijo MD  Last diagnosis associated with med order: 1. Essential hypertension  - lisinopril (PRINIVIL,ZESTRIL) 10 MG tablet; Take 1 tablet (10 mg total) by mouth daily.  Dispense: 30 tablet; Refill: 0    2. Type 2 diabetes mellitus (H)  - glipiZIDE (GLUCOTROL XL) 10 MG 24 hr tablet; Take 1 tablet (10 mg total) by mouth 2 (two) times a day.  Dispense: 60 tablet; Refill: 0  - pioglitazone (ACTOS) 30 MG  tablet; Take 0.5 tablets (15 mg total) by mouth daily.  Dispense: 15 tablet; Refill: 0    3. Hyperlipidemia  - atorvastatin (LIPITOR) 80 MG tablet; Take 1 tablet (80 mg total) by mouth at bedtime.  Dispense: 90 tablet; Refill: 3    4. Diabetes mellitus (H)  - metFORMIN (GLUCOPHAGE-XR) 500 MG 24 hr tablet; Take 4 tablets (2,000 mg total) by mouth daily with supper.  Dispense: 120 tablet; Refill: 0     If protocol passes may refill for 12 months if within 3 months of last provider visit (or a total of 15 months).           Passed - A1C in last 6 months     Hemoglobin A1c   Date Value Ref Range Status   04/04/2019 11.6 (H) 3.5 - 6.0 % Final               Passed - Microalbumin in last year      Microalbumin, Random Urine   Date Value Ref Range Status   04/04/2019 8.37 (H) 0.00 - 1.99 mg/dL Final

## 2021-05-31 VITALS — HEIGHT: 71 IN | BODY MASS INDEX: 31.92 KG/M2 | WEIGHT: 228 LBS

## 2021-06-02 VITALS — WEIGHT: 228 LBS | HEIGHT: 71 IN | BODY MASS INDEX: 31.92 KG/M2

## 2021-06-05 VITALS
RESPIRATION RATE: 20 BRPM | HEIGHT: 71 IN | WEIGHT: 202 LBS | BODY MASS INDEX: 28.28 KG/M2 | DIASTOLIC BLOOD PRESSURE: 76 MMHG | TEMPERATURE: 98.6 F | OXYGEN SATURATION: 98 % | HEART RATE: 106 BPM | SYSTOLIC BLOOD PRESSURE: 120 MMHG

## 2021-06-06 NOTE — TELEPHONE ENCOUNTER
Refill Approved    Rx renewed per Medication Renewal Policy. Medication was last renewed on 4/4/19.    Ifeoma Crump, [facility] Triage/Med Refill 2/20/2020     Requested Prescriptions   Pending Prescriptions Disp Refills     sildenafiL (VIAGRA) 50 MG tablet 20 tablet 1     Sig: Take 1 tablet (50 mg total) by mouth as needed for erectile dysfunction.       Medications for Impotence Refill Protocol Passed - 2/17/2020 10:52 AM        Passed - PCP or prescribing provider visit in last year     Last office visit with prescriber/PCP: 4/4/2019 Kylie Armijo MD OR same dept: 4/4/2019 Kylie Armijo MD OR same specialty: 4/4/2019 Kylie Armijo MD  Last physical: 6/13/2016 Last MTM visit: Visit date not found   Next visit within 3 mo: Visit date not found  Next physical within 3 mo: Visit date not found  Prescriber OR PCP: Kylie Armijo MD  Last diagnosis associated with med order: 1. Erectile dysfunction, unspecified erectile dysfunction type  - sildenafil (VIAGRA) 50 MG tablet; Take 1 tablet (50 mg total) by mouth as needed for erectile dysfunction.  Dispense: 20 tablet; Refill: 1    If protocol passes may refill for 12 months if within 3 months of last provider visit (or a total of 15 months).

## 2021-06-08 NOTE — PROGRESS NOTES
Patient's wife provided with a 30 day supply of metformin 1,000mg tabs for patient to take, to take one tab by mouth BID.      [employer], Lot # [id number], exp date 5/17

## 2021-06-11 NOTE — TELEPHONE ENCOUNTER
RN cannot approve Refill Request    RN can NOT refill this medication Protocol failed and NO refill given. Last office visit: 4/4/2019 Kylie Armijo MD Last Physical: Visit date not found Last MTM visit: Visit date not found Last visit same specialty: 4/4/2019 Kylie Armijo MD.  Next visit within 3 mo: Visit date not found  Next physical within 3 mo: Visit date not found      Ifeoma Crump, [facility] Connection Triage/Med Refill 9/11/2020    Requested Prescriptions   Pending Prescriptions Disp Refills     sildenafiL (VIAGRA) 50 MG tablet 20 tablet 1     Sig: Take 1 tablet (50 mg total) by mouth as needed for erectile dysfunction.       Medications for Impotence Refill Protocol Failed - 9/11/2020 12:09 PM        Failed - PCP or prescribing provider visit in last year     Last office visit with prescriber/PCP: 4/4/2019 Kylie Armijo MD OR same dept: Visit date not found OR same specialty: 4/4/2019 Kylie Armijo MD  Last physical: Visit date not found Last MTM visit: Visit date not found   Next visit within 3 mo: Visit date not found  Next physical within 3 mo: Visit date not found  Prescriber OR PCP: Kylie Armijo MD  Last diagnosis associated with med order: 1. Erectile dysfunction, unspecified erectile dysfunction type  - sildenafiL (VIAGRA) 50 MG tablet; Take 1 tablet (50 mg total) by mouth as needed for erectile dysfunction.  Dispense: 20 tablet; Refill: 1    If protocol passes may refill for 12 months if within 3 months of last provider visit (or a total of 15 months).

## 2021-06-12 NOTE — PROGRESS NOTES
Assessment/Plan:     1. Type 2 diabetes mellitus with hyperglycemia, without long-term current use of insulin (H)  Inadequately controlled with A1c greater than 14, primarily related to medication noncompliance, likely related to lifestyle changes, likely related to gradual progression of type 2 diabetes.  Advised that he reinitiate his oral medications.  He and his wife are going to explore options for injectable medications.  Ultimately may benefit from insulin injections, but he would like to first start with oral medications which I think is reasonable.  Advised that he resume his daily aspirin and a atorvastatin as well.  I like him to follow-up by virtual visit in 4 weeks to assess blood sugar control.  We will have our Care Coordination team work with him to see if we can secure medical insurance which would allow him to test his sugars and will also afford better opportunity for medication management of his diabetes.  We will check direct LDL, will check kidney and liver function and comprehensive metabolic panel, and will check urine microalbumin today.  - Glycosylated Hemoglobin A1c; Standing  - Microalbumin, Random Urine  - Glycosylated Hemoglobin A1c  - Comprehensive Metabolic Panel  - Ambulatory referral to Care Management (Primary Care)    2. Hyperlipidemia, unspecified hyperlipidemia type  Advise resumption of atorvastatin.  Hold off on checking direct LDL as it will not impact our decision making today.  He is not fasting to check other components of cholesterol.    3. Hypertension  Adequately controlled, however I had like him to resume lisinopril.    4. Erectile dysfunction, unspecified erectile dysfunction type  Discussed importance of diabetes control and management of erectile dysfunction.  Continue sildenafil.    5. Chronic pain of left knee  Advised follow-up with orthopedics given persistence of pain.  Ibuprofen as needed.  Will check kidney function today to ensure that this is safe for  "him to take.    6.  Mild depression symptoms but not meeting criteria for diagnosis  Advised efforts at healthy lifestyle habits, discussed that frequent sleep disruption due to polyuria is likely contributing to fatigue and ability to enjoy life.  Encourage consideration of cognitive behavioral therapy, would also consider bupropion, he will let me know.      Patient Instructions   We should consider Trulicity, Ozempic, or Bydureon, once weekly injections to help in management of diabetes.    I recommend you receive the flu vaccine.    Consider whether you would like to try an antidepressant such as bupropion.    I will have our care coordination team contact you regarding assistance in securing medical insurance.       Return in about 4 weeks (around 12/7/2020) for using a phone [id number] of diabetes.      Subjective:      Jt Venegas is a 55 y.o. male presented to clinic today for follow-up of type 2 diabetes.  Unfortunately he states he has been taking his medication about 4 days a week only, skipping it many days, has stopped his aspirin, frequently skipped his atorvastatin, and frequently skips his lisinopril.  States that he is very frustrated that he is having to take medications.  Has been very tired.  States has had polyuria, waking at least 3 or 4 times every night but feels like he is emptying and has a decent urinary stream.  Describes polydipsia as well.  Has not been checking his blood sugars.  Denies any chest pain, palpitations, shortness of breath, edema, lightheadedness, distal paresthesias or reduced sensation.  Wife is concerned as he has been more depressed, much more down, irritable,\" with a negative attitude\".  He states that sometimes he will be frustrated as he is somewhat forgetful at times.  He is not finding enjoyment in things currently.  Struggling with left knee pain, is always painful, using a brace.  Using icy hot and ibuprofen about 800 mg total throughout the day with " some improvement in the pain.  Sometimes locks.  Occasionally unsteady.  Frustrated that he is feeling tired all the time.  Ongoing struggles with erectile dysfunction inadequately controlled with sildenafil.  Exercising by walking the dogs daily.  He is also walking at work.  Looking forward to senior living though he has no definite plans.  He has not yet secured insurance.  Is interested in assistance with this.  Interested in holding off and preventive care measures until insurance has been secured significant cost.    Current Outpatient Medications   Medication Sig Dispense Refill     atorvastatin (LIPITOR) 80 MG tablet Take 1 tablet (80 mg total) by mouth at bedtime. 90 tablet 0     blood-glucose meter Misc Use 1 Device As Directed 2 (two) times a day. 1 each 0     glipiZIDE (GLUCOTROL XL) 10 MG 24 hr tablet Take 1 tablet (10 mg total) by mouth 2 (two) times a day. 60 tablet 0     lisinopriL (PRINIVIL,ZESTRIL) 10 MG tablet Take 1 tablet (10 mg total) by mouth daily. 90 tablet 0     metFORMIN (GLUCOPHAGE-XR) 500 MG 24 hr tablet Take 4 tablets (2,000 mg total) by mouth daily with supper. 120 tablet 0     pioglitazone (ACTOS) 30 MG tablet Take 0.5 tablets (15 mg total) by mouth daily. 90 tablet 0     sildenafiL (VIAGRA) 50 MG tablet Take 1 tablet (50 mg total) by mouth as needed for erectile dysfunction. 20 tablet 0     TRUETRACK TEST strips TEST BLOOD SUGAR TWICE DAILY OR AS DIRECTED. 200 strip 0     aspirin 81 MG EC tablet Take 81 mg by mouth daily.       No current facility-administered medications for this visit.        Past Medical History, Family History, and Social History reviewed.  No past medical history on file.  No past surgical history on file.  Aspirin (tartrazine only) and Hydrochlorothiazide  No family history on file.  Social History     Socioeconomic History     Marital status:      Spouse name: Not on file     Number of children: Not on file     Years of education: Not on file     Highest  "education level: Not on file   Occupational History     Not on file   Social Needs     Financial resource strain: Not on file     Food insecurity     Worry: Not on file     Inability: Not on file     Transportation needs     Medical: Not on file     Non-medical: Not on file   Tobacco Use     Smoking status: Current Every Day Smoker     Smokeless tobacco: Never Used   Substance and Sexual Activity     Alcohol use: Not on file     Drug use: Not on file     Sexual activity: Not on file   Lifestyle     Physical activity     Days per week: Not on file     Minutes per session: Not on file     Stress: Not on file   Relationships     Social connections     Talks on phone: Not on file     Gets together: Not on file     Attends Yazidi service: Not on file     Active member of club or organization: Not on file     Attends meetings of clubs or organizations: Not on file     Relationship status: Not on file     Intimate partner violence     Fear of current or ex partner: Not on file     Emotionally abused: Not on file     Physically abused: Not on file     Forced sexual activity: Not on file   Other Topics Concern     Not on file   Social History Narrative     Not on file         Review of systems is as stated in HPI, and the remainder of the 10 system review is otherwise negative.    Objective:     Vitals:    11/09/20 1619   BP: 120/76   Pulse: (!) 106   Resp: 20   Temp: 98.6  F (37  C)   TempSrc: Tympanic   SpO2: 98%   Weight: 202 lb (91.6 kg)   Height: 5' 10.5\" (1.791 m)    Body mass index is 28.57 kg/m .    Alert male.  Mucous membranes moist.  Heart with regular rate and rhythm.  Lungs clear and well aerated.  Extremities without edema.    Diabetic foot exam: normal DP and PT pulses, no trophic changes or ulcerative lesions and normal sensory exam     Office Visit on 11/09/2020   Component Date Value Ref Range Status     Hemoglobin A1c 11/09/2020 >14.0* <=5.6 % Final    Normal <5.7% Prediabete 5.7-6.4% Diabletes 6.5% or " higher - adopted from ADA consensus guidelines            This note has been dictated using voice recognition software. Any grammatical or context distortions are unintentional and inherent to the the software.

## 2021-06-12 NOTE — PATIENT INSTRUCTIONS - HE
We should consider Trulicity, Ozempic, or Bydureon, once weekly injections to help in management of diabetes.    I recommend you receive the flu vaccine.    Consider whether you would like to try an antidepressant such as bupropion.    I will have our care coordination team contact you regarding assistance in securing medical insurance.

## 2021-06-12 NOTE — PROGRESS NOTES
Per note vis wife's MyChart:      Also Izabela Theo 1965 my  will need a refill of his Glizipede? Someone called last month and she said he needed to be seen, so hopefully by middle of October I can make him a appointment so he can see you. I know he has be complaining about his feet and sometimes on his legs. But it could be his boots that he wears and when he wears them at work when he gets home his legs and feet are really hurting. Like tonight he felt like he had a bruise on his heel? So hes going to try to wear tennis shoes and see if that goes away? But if its his diabetis is there something that he can put on his legs or something when he does get that pain? We did ice tonight and it seem to help? But as soon as I get my cards believe me that Pat and me and my son will be in to see you right away. But if you could let us know when those flares come on what should we do?

## 2021-06-12 NOTE — TELEPHONE ENCOUNTER
"Critical Lab result:  Glucose: 462  Lab was drawn at 1729 today.    Pt is on:  Pioglitazone 30mg tablet: take 15mg daily  Metformine 500mg 24 hours reliease tablet: Take 2,000 mg by mouth with supper.  Glipizide 10mg 24 hour release: Take 10 mg 2 times a day.       Noted in Pt Office visit \"A1c greater than 14, primarily related to medication noncompliance\". Pt talked with Dr Armijo today about trying his oral medications.    Paging April Mccray @ 11:10      Dr Mccray states to call pt and remind him to take his medications, inform him of his blood sugar, check pts condition, advise to drink more water, and limit sweets. And let the Pt know that Dr Armijo will be in contact with the pt tomorrow for further recommendations.    Wife answered. Stated he took his medications tonight. Drinking water. No symptoms.     Lacy Duffy RN Nurse Triage 11/9/2020 11:27 PM     Reason for Disposition    Lab or radiology calling with CRITICAL test results    Protocols used: PCP CALL - NO TRIAGE-A-      "

## 2021-06-13 NOTE — TELEPHONE ENCOUNTER
Refill Approved    Rx renewed per Medication Renewal Policy. Medication was last renewed on 9/11/20, last OV 11/9/20.    Khushbu Cruz, [facility] Triage/Med Refill 11/26/2020     Requested Prescriptions   Pending Prescriptions Disp Refills     glipiZIDE (GLUCOTROL XL) 10 MG 24 hr tablet 60 tablet 0     Sig: Take 1 tablet (10 mg total) by mouth 2 (two) times a day.       Oral Hypoglycemics Refill Protocol Passed - 11/25/2020  9:14 AM        Passed - Visit with PCP or prescribing provider visit in last 6 months       Last office visit with prescriber/PCP: 11/9/2020 OR same dept: 11/9/2020 Kylie Armijo MD OR same specialty: 11/9/2020 Kylie Armijo MD Last physical: Visit date not found Last MTM visit: Visit date not found         Next appt within 3 mo: Visit date not found  Next physical within 3 mo: Visit date not found  Prescriber OR PCP: Kylie Armijo MD  Last diagnosis associated with med order: 1. Type 2 diabetes mellitus (H)  - glipiZIDE (GLUCOTROL XL) 10 MG 24 hr tablet; Take 1 tablet (10 mg total) by mouth 2 (two) times a day.  Dispense: 60 tablet; Refill: 0    2. Diabetes mellitus (H)  - metFORMIN (GLUCOPHAGE-XR) 500 MG 24 hr tablet; Take 4 tablets (2,000 mg total) by mouth daily with supper.  Dispense: 120 tablet; Refill: 0     If protocol passes may refill for 12 months if within 3 months of last provider visit (or a total of 15 months).           Passed - A1C in last 6 months     Hemoglobin A1c   Date Value Ref Range Status   11/09/2020 >14.0 (H) <=5.6 % Final     Comment:     Normal <5.7% Prediabete 5.7-6.4% Diabletes 6.5% or higher - adopted from ADA consensus guidelines               Passed - Microalbumin in last year      Microalbumin, Random Urine   Date Value Ref Range Status   11/09/2020 3.53 (H) 0.00 - 1.99 mg/dL Final                  Passed - Blood pressure in last year     BP Readings from Last 1 Encounters:   11/09/20 120/76             Passed - Serum creatinine in  last year     Creatinine   Date Value Ref Range Status   11/09/2020 1.44 (H) 0.70 - 1.30 mg/dL Final                metFORMIN (GLUCOPHAGE-XR) 500 MG 24 hr tablet 120 tablet 0     Sig: Take 4 tablets (2,000 mg total) by mouth daily with supper.       Metformin Refill Protocol Passed - 11/25/2020  9:14 AM        Passed - Blood pressure in last 12 months     BP Readings from Last 1 Encounters:   11/09/20 120/76             Passed - LFT or AST or ALT in last 12 months     Albumin   Date Value Ref Range Status   11/09/2020 3.9 3.5 - 5.0 g/dL Final     Bilirubin, Total   Date Value Ref Range Status   11/09/2020 0.6 0.0 - 1.0 mg/dL Final     Bilirubin, Direct   Date Value Ref Range Status   01/04/2013 <0.1 <0.6 mg/dL Final     Alkaline Phosphatase   Date Value Ref Range Status   11/09/2020 120 45 - 120 U/L Final     AST   Date Value Ref Range Status   11/09/2020 26 0 - 40 U/L Final     ALT   Date Value Ref Range Status   11/09/2020 28 0 - 45 U/L Final     Protein, Total   Date Value Ref Range Status   11/09/2020 7.2 6.0 - 8.0 g/dL Final                Passed - GFR or Serum Creatinine in last 6 months     GFR MDRD Non Af Amer   Date Value Ref Range Status   11/09/2020 51 (L) >60 mL/min/1.73m2 Final     GFR MDRD Af Amer   Date Value Ref Range Status   11/09/2020 >60 >60 mL/min/1.73m2 Final             Passed - Visit with PCP or prescribing provider visit in last 6 months or next 3 months     Last office visit with prescriber/PCP: 11/9/2020 OR same dept: 11/9/2020 Kylie Armijo MD OR same specialty: 11/9/2020 Kylie Armijo MD Last physical: Visit date not found Last MTM visit: Visit date not found         Next appt within 3 mo: Visit date not found  Next physical within 3 mo: Visit date not found  Prescriber OR PCP: Kylie Armijo MD  Last diagnosis associated with med order: 1. Type 2 diabetes mellitus (H)  - glipiZIDE (GLUCOTROL XL) 10 MG 24 hr tablet; Take 1 tablet (10 mg total) by mouth 2 (two) times a  day.  Dispense: 60 tablet; Refill: 0    2. Diabetes mellitus (H)  - metFORMIN (GLUCOPHAGE-XR) 500 MG 24 hr tablet; Take 4 tablets (2,000 mg total) by mouth daily with supper.  Dispense: 120 tablet; Refill: 0     If protocol passes may refill for 12 months if within 3 months of last provider visit (or a total of 15 months).           Passed - A1C in last 6 months     Hemoglobin A1c   Date Value Ref Range Status   11/09/2020 >14.0 (H) <=5.6 % Final     Comment:     Normal <5.7% Prediabete 5.7-6.4% Diabletes 6.5% or higher - adopted from ADA consensus guidelines               Passed - Microalbumin in last year      Microalbumin, Random Urine   Date Value Ref Range Status   11/09/2020 3.53 (H) 0.00 - 1.99 mg/dL Final

## 2021-06-13 NOTE — PROGRESS NOTES
Clinic Care Coordination Contact  [facility]/Voicemail       Clinical Data: Care Coordinator Outreach  Outreach attempted x 2.  Left message on patient's voicemail with call back information and requested return call.  Plan: Care Coordinator will send unable to contact letter with care coordinator contact information via [facility]. Care Coordinator will do no further outreaches at this time.  ALEX Caputo  Clinic Care Coordinator  [phone]

## 2021-06-13 NOTE — PROGRESS NOTES
The clinic Community Health Worker talked with  Patient's spouse Diego jacobson at the request of the PCP to discuss possible Clinic Care Coordination enrollment.  The service was described to the patient and immediate needs were discussed.  The patient declined enrollement at this time.  The PCP is encouraged to refer in the future if the patient's needs change.    She states patient is doing much better, taking his meds daily- No concerns or questions

## 2021-06-13 NOTE — PROGRESS NOTES
Clinic Care Coordination Contact  [facility]/Voicemail       Clinical Data: Care Coordinator Outreach  Outreach attempted x 1.  Left message on patient's voicemail with call back information and requested return call.  Plan:  Care Coordinator will try to reach patient again in 1-2 business days.

## 2021-06-13 NOTE — PROGRESS NOTES
Clinic Care Coordination Contact  [facility]/Voicemail       Clinical Data: Care Coordinator Outreach  Outreach attempted x 1.  Left message on patient's voicemail with call back information and requested return call.  Plan:  Care Coordinator will try to reach patient again in 1-2 business days.

## 2021-06-15 NOTE — PROGRESS NOTES
Assessment/Plan:     1. Type 2 diabetes mellitus  Inadequate control, partly due to medication and diet noncompliance.  Resources limited due to lack of insurance and financial limitations.  Encouraged him to explore option of [facility] for lower cost medical care.  I will have him stop Januvia which is quite expensive and instead start Actos 15 mg daily.  Ultimately I think he would benefit most from Trulicity or Victoza, also discussed transition to insulin as well.  This would require frequent follow-up and he is not interested for that reason.  Advised having some blood sugar checking supplies on hand, to ensure that he is not having lows, but I think it is okay for him to continue without aggressive checking in light of current circumstances.  We reviewed carbohydrates of less than 60 g per meal and less than 15 g per snack.  Discussed that urine microalbumin, lipid panel, and liver function are needed, but will forego these today due to financial constraints expressed by patient.  - Glycosylated Hemoglobin A1c  - Basic Metabolic Panel    2. Acute bronchitis  Lungs sound clear on exam.  Symptoms consistent with mild bronchitis.  Discussed that he could have an underlying pneumonia though not highly suggestive of this.  Prescription provided for azithromycin.  Discussed warning signs and symptoms, when to follow-up.    3. Leg pain  I suspect this is related to current acute illness.  Exam unremarkable.  We will see how things to with treatment of bronchitis as above.  Will check basic metabolic panel to look for electrolyte disturbances contributing.    Advised tobacco cessation, he declines assistance.  Encouraged colonoscopy, influenza vaccine, and tetanus booster, which she declines today.      The following high BMI interventions were performed this visit: encouragement to exercise and lifestyle education regarding diet  I have counseled the patient for tobacco cessation and the follow up will  occur  at the next visit.    Subjective:      Jt Venegas is a 52 y.o. male presented to clinic today for follow-up of type 2 diabetes.  Unfortunately he remains out of insurance and therefore is quite concerned about cost of today's visit.  He has been taking glipizide, Januvia, and metformin.  Admits that he is forgetting his evening dose of glipizide and metformin about 50% of the time.  Denies any sensation of low blood sugars.  Does not check his sugars.  He has no dedicated exercise but works pouring concrete in the construction business and is active during the day.  Admits that he has been eating more brown rice and whole-wheat bread, does not count his carbohydrates.  Continues to smoke, has actually quit for the past 4 days due to illness.    Describes onset of cough about 10 days ago that seems to be worsening.  Currently nonproductive and does not seem to loosen though he feels congested in his chest.  Feels a heaviness centrally.  No overt chest pain.  Some mild shortness of breath over the past 2-3 days.  Denies fevers.  Denies sinus congestion or nasal drainage.  No sore throat.  No wheezing.  He is a smoker, quit for the last 4 days.  No prior history of asthma or inhaler use.  Son with recent URI treated at [place], did not require antibiotics.  Patient also states that his legs feel very achy and weak just today especially in his knees and shins but also up into his thighs.  This is different.  He has had some chronic mild numbness at the bottoms of his feet and some pain in his left heel both of which have been ongoing.    Current Outpatient Prescriptions   Medication Sig Dispense Refill     aspirin 81 mg chewable tablet Chew 81 mg daily.       atorvastatin (LIPITOR) 80 MG tablet TAKE 1 TABLET BY MOUTH EVERY NIGHT AT BEDTIME 90 tablet 4     blood-glucose meter Misc Use 1 Device As Directed 2 (two) times a day. 1 each 0     glipiZIDE (GLUCOTROL XL) 10 MG 24 hr tablet TAKE ONE TABLET BY MOUTH  "TWICE A DAY 60 tablet 0     lisinopril (PRINIVIL,ZESTRIL) 10 MG tablet TAKE ONE TABLET BY MOUTH EVERY DAY 30 tablet 0     TRUETRACK TEST strips TEST BLOOD SUGAR TWICE DAILY OR AS DIRECTED. 200 strip 0     azithromycin (ZITHROMAX) 250 MG tablet Take 2 tabs by mouth on day one, then one tab by mouth daily days two through five 6 tablet 0     metFORMIN (GLUCOPHAGE-XR) 500 MG 24 hr tablet TAKE FOUR TABLETS BY MOUTH EVERY DAY WITH SUPPER 30 tablet 0     OMEGA-3/DHA/EPA/FISH OIL (FISH OIL-OMEGA-3 FATTY ACIDS) 300-1,000 mg capsule Take 2 capsules (2 g total) by mouth daily.  0     pioglitazone (ACTOS) 30 MG tablet Take 0.5 tablets (15 mg total) by mouth daily. 45 tablet 4     No current facility-administered medications for this visit.        Past Medical History, Family History, and Social History reviewed.  No past medical history on file.  No past surgical history on file.  Aspirin (tartrazine only) and Hydrochlorothiazide  No family history on file.  Social History     Social History     Marital status:      Spouse name: N/A     Number of children: N/A     Years of education: N/A     Occupational History     Not on file.     Social History Main Topics     Smoking status: Current Every Day Smoker     Smokeless tobacco: Not on file     Alcohol use Not on file     Drug use: Not on file     Sexual activity: Not on file     Other Topics Concern     Not on file     Social History Narrative         Review of systems is as stated in HPI, and the remainder of the 10 system review is otherwise negative.    Objective:     Vitals:    01/03/18 0911   BP: 130/84   Patient Site: Right Arm   Patient Position: Sitting   Cuff Size: Adult Large   Pulse: (!) 102   Resp: 20   Temp: 97.9  F (36.6  C)   TempSrc: Oral   SpO2: 96%   Weight: (!) 228 lb (103.4 kg)   Height: 5' 10.5\" (1.791 m)    Body mass index is 32.25 kg/(m^2).    Alert male.  Appears mildly uncomfortable but no acute distress and nontoxic.  Pupils are equal, round, and " reactive to light.  Oropharynx clear.  Neck supple without lymphadenopathy.  Heart with regular rate and rhythm.  Lungs are clear and well aerated throughout.  No wheezes or rhonchi.  Extremities without edema or rashes.  He has good peripheral pulses.  No skin changes of his feet.      This note has been dictated using voice recognition software. Any grammatical or context distortions are unintentional and inherent to the the software.

## 2021-06-16 NOTE — TELEPHONE ENCOUNTER
Medication pended from fax copy:         Medication: Pioglitazone HCL 30 MG tabs    Last appointment: 11/9/2020    Last 3 blood pressures:  BP Readings from Last 3 Encounters:   11/09/20 120/76   04/04/19 126/70   01/03/18 130/84

## 2021-06-16 NOTE — TELEPHONE ENCOUNTER
Medication pended from fax copy:         Medication: LISINOPRIL 10MG TABS    Last appointment: 11/9/2020    Last 3 blood pressures:  BP Readings from Last 3 Encounters:   11/09/20 120/76   04/04/19 126/70   01/03/18 130/84

## 2021-06-16 NOTE — TELEPHONE ENCOUNTER
Refill Approved    Rx renewed per Medication Renewal Policy. Medication was last renewed on 9/11/20.    Anthony Beach, [facility] Triage/Med Refill 3/28/2021     Requested Prescriptions   Pending Prescriptions Disp Refills     lisinopriL (PRINIVIL,ZESTRIL) 10 MG tablet 90 tablet 0     Sig: Take 1 tablet (10 mg total) by mouth daily.       Ace Inhibitors Refill Protocol Passed - 3/26/2021 10:50 AM        Passed - PCP or prescribing provider visit in past 12 months       Last office visit with prescriber/PCP: 11/9/2020 Kylie Armijo MD OR same dept: 11/9/2020 Kylie Armijo MD OR same specialty: 11/9/2020 Kylie Armijo MD  Last physical: Visit date not found Last MTM visit: Visit date not found   Next visit within 3 mo: Visit date not found  Next physical within 3 mo: Visit date not found  Prescriber OR PCP: Kylie Armijo MD  Last diagnosis associated with med order: 1. Essential hypertension  - lisinopriL (PRINIVIL,ZESTRIL) 10 MG tablet; Take 1 tablet (10 mg total) by mouth daily.  Dispense: 90 tablet; Refill: 0    If protocol passes may refill for 12 months if within 3 months of last provider visit (or a total of 15 months).             Passed - Serum Potassium in past 12 months     Lab Results   Component Value Date    Potassium 5.0 11/09/2020             Passed - Blood pressure filed in past 12 months     BP Readings from Last 1 Encounters:   11/09/20 120/76             Passed - Serum Creatinine in past 12 months     Creatinine   Date Value Ref Range Status   11/09/2020 1.44 (H) 0.70 - 1.30 mg/dL Final

## 2021-06-17 NOTE — TELEPHONE ENCOUNTER
Refill Approved    Rx renewed per Medication Renewal Policy. Medication was last renewed on 9/11/20.    Anthony QUINONESKaryn Beach, [facility] Triage/Med Refill 5/13/2021     Requested Prescriptions   Pending Prescriptions Disp Refills     sildenafiL (VIAGRA) 50 MG tablet 20 tablet 0     Sig: Take 1 tablet (50 mg total) by mouth as needed for erectile dysfunction.       Medications for Impotence Refill Protocol Passed - 5/12/2021 11:40 AM        Passed - PCP or prescribing provider visit in last year     Last office visit with prescriber/PCP: 11/9/2020 Kylie Armijo MD OR same dept: 11/9/2020 Kylie Armijo MD OR same specialty: 11/9/2020 Kylie Armijo MD  Last physical: Visit date not found Last MTM visit: Visit date not found   Next visit within 3 mo: Visit date not found  Next physical within 3 mo: Visit date not found  Prescriber OR PCP: Kylie Armijo MD  Last diagnosis associated with med order: 1. Erectile dysfunction, unspecified erectile dysfunction type  - sildenafiL (VIAGRA) 50 MG tablet; Take 1 tablet (50 mg total) by mouth as needed for erectile dysfunction.  Dispense: 20 tablet; Refill: 0    2. Hyperlipidemia  - atorvastatin (LIPITOR) 80 MG tablet; Take 1 tablet (80 mg total) by mouth at bedtime.  Dispense: 90 tablet; Refill: 0    If protocol passes may refill for 12 months if within 3 months of last provider visit (or a total of 15 months).                atorvastatin (LIPITOR) 80 MG tablet 90 tablet 0     Sig: Take 1 tablet (80 mg total) by mouth at bedtime.       Statins Refill Protocol (Hmg CoA Reductase Inhibitors) Passed - 5/12/2021 11:40 AM        Passed - PCP or prescribing provider visit in past 12 months      Last office visit with prescriber/PCP: 11/9/2020 Kylie Armijo MD OR same dept: 11/9/2020 Kylie Armijo MD OR same specialty: 11/9/2020 Kylie Armijo MD  Last physical: Visit date not found Last MTM visit: Visit date not found   Next visit within 3 mo: Visit  date not found  Next physical within 3 mo: Visit date not found  Prescriber OR PCP: Kylie Armijo MD  Last diagnosis associated with med order: 1. Erectile dysfunction, unspecified erectile dysfunction type  - sildenafiL (VIAGRA) 50 MG tablet; Take 1 tablet (50 mg total) by mouth as needed for erectile dysfunction.  Dispense: 20 tablet; Refill: 0    2. Hyperlipidemia  - atorvastatin (LIPITOR) 80 MG tablet; Take 1 tablet (80 mg total) by mouth at bedtime.  Dispense: 90 tablet; Refill: 0    If protocol passes may refill for 12 months if within 3 months of last provider visit (or a total of 15 months).

## 2021-06-19 NOTE — LETTER
Letter by Kylie Armijo MD at      Author: Kylie Armijo MD Service: -- Author Type: --    Filed:  Encounter Date: 4/5/2019 Status: (Other)         Jt Venegas  [address] [place] [id number]             April 5, 2019        Dear Mr. Venegas,    Below are the results from your recent visit:    Resulted Orders   Microalbumin, Random Urine   Result Value Ref Range    Microalbumin, Random Urine 8.37 (H) 0.00 - 1.99 mg/dL    Creatinine, Urine 157.6 mg/dL    Microalbumin/Creatinine Ratio Random Urine 53.1 (H) <=19.9 mg/g    Narrative    Microalbumin, Random Urine  <2.0 mg/dL . . . . . . . . Normal  3.0-30.0 mg/dL . . . . . . Microalbuminuria  >30.0 mg/dL . . . . . .  . Clinical Proteinuria    Microalbumin/Creatinine Ratio, Random Urine  <20 mg/g . . . . .. . . . Normal   mg/g . . . . . . . Microalbuminuria  >300 mg/g . . . . . . . . Clinical Proteinuria     Glycosylated Hemoglobin A1c   Result Value Ref Range    Hemoglobin A1c 11.6 (H) 3.5 - 6.0 %   Comprehensive Metabolic Panel   Result Value Ref Range    Sodium 135 (L) 136 - 145 mmol/L    Potassium 5.0 3.5 - 5.0 mmol/L    Chloride 100 98 - 107 mmol/L    CO2 21 (L) 22 - 31 mmol/L    Anion Gap, Calculation 14 5 - 18 mmol/L    Glucose 371 (H) 70 - 125 mg/dL    BUN 28 (H) 8 - 22 mg/dL    Creatinine 1.51 (H) 0.70 - 1.30 mg/dL    GFR MDRD Af Amer 59 (L) >60 mL/min/1.73m2    GFR MDRD Non Af Amer 48 (L) >60 mL/min/1.73m2    Bilirubin, Total 0.5 0.0 - 1.0 mg/dL    Calcium 10.2 8.5 - 10.5 mg/dL    Protein, Total 7.3 6.0 - 8.0 g/dL    Albumin 4.0 3.5 - 5.0 g/dL    Alkaline Phosphatase 93 45 - 120 U/L    AST 15 0 - 40 U/L    ALT 35 0 - 45 U/L    Narrative    Fasting Glucose reference range is 70-99 mg/dL per  American Diabetes Association (ADA) guidelines.   LDL Cholesterol, Direct   Result Value Ref Range    Direct  (H) <=129 mg/dl       Your kidney function has worsened a bit from last time.  It is not a severe or highly concerning change, but  it is a change.  This likely is stemming from the ongoing high blood sugars.  This does not require any intervention at this time other than ensuring you are drinking adequate fluids and continuing to work on better control of your blood sugars.  I would like to recheck this in about a month.  This may also be checked at your pharmacist visit, depending on that timing.  Your cholesterol has also worsened.  I would like to check a complete fasting cholesterol panel at your next visit, so I would like to to come in fasting at your next visit.  You should continue atorvastatin.      Please call with questions or contact us using [employer].    Sincerely,        Electronically signed by Kylie Armijo MD

## 2021-06-19 NOTE — LETTER
Letter by Kylie Armijo MD at      Author: Kylie Armijo MD Service: -- Author Type: --    Filed:  Encounter Date: 4/15/2019 Status: (Other)        [facility]  [address]  [facility] [id number] [phone]         Jt Venegas  [address] [place] [id number]        04/15/19    Dear Jt Venegas,     At [facility] we care about your health and well-being. Your primary care provider is committed to ensuring you receive high quality care and has chosen a network of specialists to assist in providing that care. Recently Dr Kylie Armijo referred you to [facility] for specialty care.       It is important to your overall health to follow through with the recommendation from your provider. Please call [facility] ([phone]) at your earliest convenience for assistance in scheduling an appointment. If you have already scheduled this appointment, please disregard this notice. Thank you for choosing [facility] for your healthcare needs.       Sincerely,       [facility] Specialty Scheduling   Dr Kylie Armijo

## 2021-06-20 NOTE — LETTER
Letter by Kylie Armijo MD at      Author: Kylie Armijo MD Service: -- Author Type: --    Filed:  Encounter Date: 7/15/2020 Status: (Other)         Jt Venegas  [address] [facility] [id number]      July 15, 2020      Dear Jt Venegas,    Per our refill protocol, you are due for a medication check office visit. Your prescription for METFORMIN AND GLIPIZIDE,LIPITOR was sent to your pharmacy (07.14.2020). Please call [phone] to schedule an office visit with DR. ARMIJO OR WITH ONE OF MY PARTNERS before your next refill is needed to avoid delays.    Thank you,  [facility]

## 2021-06-21 NOTE — LETTER
Letter by Cris Rosas CHW at      Author: Cris Rosas CHW Service: -- Author Type: --    Filed:  Encounter Date: 11/10/2020 Status: (Other)       CARE COORDINATION  [facility]    November 12, 2020    Jt Venegas  [address]  [facility] [address]      Dear Jt,    I am a clinic community health worker who works with Kylie Armijo MD at [phone]. I wanted to introduce myself and provide you with my contact information for you to be able to call me with any questions or concerns. Below is a description of clinic care coordination and how I can further assist you.      The clinic care coordination team is made up of a registered nurse,  and community health worker who understand the health care system. The goal of clinic care coordination is to help you manage your health and improve access to the health care system in the most efficient manner. The team can assist you in meeting your health care goals by providing education, coordinating services, strengthening the communication among your providers and supporting you with any resource needs.    Please feel free to contact the Community Health Worker at [phone] with any questions or concerns. We are focused on providing you with the highest-quality healthcare experience possible and that all starts with you.     Sincerely,     JESUS Bernardo

## 2021-06-21 NOTE — LETTER
Letter by Kylie Armijo MD at      Author: Kylie Armijo MD Service: -- Author Type: --    Filed:  Encounter Date: 11/11/2020 Status: (Other)         Jt Venegas  [address] [place] [id number]             November 11, 2020        Dear Mr. Venegas,    Below are the results from your recent visit:    Resulted Orders   Microalbumin, Random Urine   Result Value Ref Range    Microalbumin, Random Urine 3.53 (H) 0.00 - 1.99 mg/dL    Creatinine, Urine 76.0 mg/dL    Microalbumin/Creatinine Ratio Random Urine 46.4 (H) <=19.9 mg/g    Narrative    Microalbumin, Random Urine  <2.0 mg/dL . . . . . . . . Normal  3.0-30.0 mg/dL . . . . . . Microalbuminuria  >30.0 mg/dL . . . . . .  . Clinical Proteinuria    Microalbumin/Creatinine Ratio, Random Urine  <20 mg/g . . . . .. . . . Normal   mg/g . . . . . . . Microalbuminuria  >300 mg/g . . . . . . . . Clinical Proteinuria       Glycosylated Hemoglobin A1c   Result Value Ref Range    Hemoglobin A1c >14.0 (H) <=5.6 %      Comment:      Normal <5.7% Prediabete 5.7-6.4% Diabletes 6.5% or higher - adopted from ADA consensus guidelines   Comprehensive Metabolic Panel   Result Value Ref Range    Sodium 132 (L) 136 - 145 mmol/L    Potassium 5.0 3.5 - 5.0 mmol/L    Chloride 100 98 - 107 mmol/L    CO2 17 (L) 22 - 31 mmol/L    Anion Gap, Calculation 15 5 - 18 mmol/L    Glucose 462 (HH) 70 - 125 mg/dL    BUN 18 8 - 22 mg/dL    Creatinine 1.44 (H) 0.70 - 1.30 mg/dL    GFR MDRD Af Amer >60 >60 mL/min/1.73m2    GFR MDRD Non Af Amer 51 (L) >60 mL/min/1.73m2    Bilirubin, Total 0.6 0.0 - 1.0 mg/dL    Calcium 9.1 8.5 - 10.5 mg/dL    Protein, Total 7.2 6.0 - 8.0 g/dL    Albumin 3.9 3.5 - 5.0 g/dL    Alkaline Phosphatase 120 45 - 120 U/L    AST 26 0 - 40 U/L    ALT 28 0 - 45 U/L    Narrative    Fasting Glucose reference range is 70-99 mg/dL per  American Diabetes Association (ADA) guidelines.       Your kidney function remains reduced.  It is unclear if this is related to  dehydration from blood sugars recently being elevated, or if this is damage from diabetes.  Your urine test suggest there has been damage related to diabetes.  We need to get your blood sugars under control and watch her kidney function closely.  We should repeat this in 1 month.  Of note, your blood sugar was 462 at the time of your visit.  Based on your A1c results, this is likely the range it has been running in.      Please call with questions or contact us using [employer].    Sincerely,        Electronically signed by Kylie Armijo MD

## 2021-06-22 NOTE — TELEPHONE ENCOUNTER
RN cannot approve Refill Request    RN can NOT refill this medication overdue for office visits and/or labs.    Kobe Gomez, [facility] Triage/Med Refill 1/9/2019    Requested Prescriptions   Pending Prescriptions Disp Refills     pioglitazone (ACTOS) 30 MG tablet 45 tablet 4     Sig: Take 0.5 tablets (15 mg total) by mouth daily.    Pioglitazone Protocol Failed - 1/9/2019  7:41 AM       Failed - Blood pressure in last 12 months    BP Readings from Last 1 Encounters:   01/03/18 130/84            Failed - LFT or AST or ALT in last 12 months    Albumin   Date Value Ref Range Status   06/13/2016 4.1 3.5 - 5.0 g/dL Final     Bilirubin, Total   Date Value Ref Range Status   06/13/2016 0.8 0.0 - 1.0 mg/dL Final     Bilirubin, Direct   Date Value Ref Range Status   01/04/2013 <0.1 <0.6 mg/dL Final     Alkaline Phosphatase   Date Value Ref Range Status   06/13/2016 90 45 - 120 U/L Final     AST   Date Value Ref Range Status   06/13/2016 22 0 - 40 U/L Final     ALT   Date Value Ref Range Status   06/13/2016 34 0 - 45 U/L Final     Protein, Total   Date Value Ref Range Status   06/13/2016 7.4 6.0 - 8.0 g/dL Final               Failed - Visit with PCP or prescribing provider visit in last 6 months     Last office visit with prescriber/PCP: Visit date not found OR same dept: Visit date not found OR same specialty: 1/3/2018 Kylie Armijo MD Last physical: Visit date not found Last MTM visit: Visit date not found         Next appt within 3 mo: Visit date not found  Next physical within 3 mo: Visit date not found  Prescriber OR PCP: Kylie Armijo MD  Last diagnosis associated with med order: There are no diagnoses linked to this encounter.   If protocol passes may refill for 12 months if within 3 months of last provider visit (or a total of 15 months).          Failed - A1C in last 6 months    Hemoglobin A1c   Date Value Ref Range Status   01/03/2018 11.5 (H) 3.5 - 6.0 % Final              Failed -  Microalbumin in last year    Microalbumin, Random Urine   Date Value Ref Range Status   06/13/2016 7.63 (H) 0.00 - 1.99 mg/dL Final                 Failed - Serum creatinine in last year    Creatinine   Date Value Ref Range Status   01/03/2018 1.09 0.70 - 1.30 mg/dL Final

## 2021-06-24 NOTE — TELEPHONE ENCOUNTER
RN cannot approve Refill Request    RN can NOT refill this medication Protocol failed and NO refill given. Last office visit: 1/3/2018 Kylie Armijo MD Last Physical: 6/13/2016 Last MTM visit: Visit date not found Last visit same specialty: 1/3/2018 Kylie Armijo MD.  Next visit within 3 mo: Visit date not found  Next physical within 3 mo: Visit date not found      Kim Cordoba, [facility] Triage/Med Refill 2/20/2019    Requested Prescriptions   Pending Prescriptions Disp Refills     metFORMIN (GLUCOPHAGE-XR) 500 MG 24 hr tablet 360 tablet 3     Sig: Take 4 tablets (2,000 mg total) by mouth daily with supper.    Metformin Refill Protocol Failed - 2/20/2019  9:38 AM       Failed - Blood pressure in last 12 months    BP Readings from Last 1 Encounters:   01/03/18 130/84            Failed - LFT or AST or ALT in last 12 months    Albumin   Date Value Ref Range Status   06/13/2016 4.1 3.5 - 5.0 g/dL Final     Bilirubin, Total   Date Value Ref Range Status   06/13/2016 0.8 0.0 - 1.0 mg/dL Final     Bilirubin, Direct   Date Value Ref Range Status   01/04/2013 <0.1 <0.6 mg/dL Final     Alkaline Phosphatase   Date Value Ref Range Status   06/13/2016 90 45 - 120 U/L Final     AST   Date Value Ref Range Status   06/13/2016 22 0 - 40 U/L Final     ALT   Date Value Ref Range Status   06/13/2016 34 0 - 45 U/L Final     Protein, Total   Date Value Ref Range Status   06/13/2016 7.4 6.0 - 8.0 g/dL Final               Failed - GFR or Serum Creatinine in last 6 months    GFR MDRD Non Af Amer   Date Value Ref Range Status   01/03/2018 >60 >60 mL/min/1.73m2 Final     GFR MDRD Af Amer   Date Value Ref Range Status   01/03/2018 >60 >60 mL/min/1.73m2 Final            Failed - Visit with PCP or prescribing provider visit in last 6 months or next 3 months    Last office visit with prescriber/PCP: Visit date not found OR same dept: Visit date not found OR same specialty: 1/3/2018 Kylie Armijo MD Last physical:  Visit date not found Last MTM visit: Visit date not found         Next appt within 3 mo: Visit date not found  Next physical within 3 mo: Visit date not found  Prescriber OR PCP: Kylie Armijo MD  Last diagnosis associated with med order: 1. Diabetes mellitus (H)  - metFORMIN (GLUCOPHAGE-XR) 500 MG 24 hr tablet; Take 4 tablets (2,000 mg total) by mouth daily with supper.  Dispense: 360 tablet; Refill: 3    2. Type 2 diabetes mellitus (H)  - glipiZIDE (GLUCOTROL XL) 10 MG 24 hr tablet; Take 1 tablet (10 mg total) by mouth 2 (two) times a day.  Dispense: 180 tablet; Refill: 3     If protocol passes may refill for 12 months if within 3 months of last provider visit (or a total of 15 months).          Failed - A1C in last 6 months    Hemoglobin A1c   Date Value Ref Range Status   01/03/2018 11.5 (H) 3.5 - 6.0 % Final              Failed - Microalbumin in last year     Microalbumin, Random Urine   Date Value Ref Range Status   06/13/2016 7.63 (H) 0.00 - 1.99 mg/dL Final                  glipiZIDE (GLUCOTROL XL) 10 MG 24 hr tablet 180 tablet 3     Sig: Take 1 tablet (10 mg total) by mouth 2 (two) times a day.    Oral Hypoglycemics Refill Protocol Failed - 2/20/2019  9:38 AM       Failed - Visit with PCP or prescribing provider visit in last 6 months      Last office visit with prescriber/PCP: Visit date not found OR same dept: Visit date not found OR same specialty: 1/3/2018 Kylie Armijo MD Last physical: Visit date not found Last MTM visit: Visit date not found         Next appt within 3 mo: Visit date not found  Next physical within 3 mo: Visit date not found  Prescriber OR PCP: Kylie Armijo MD  Last diagnosis associated with med order: 1. Diabetes mellitus (H)  - metFORMIN (GLUCOPHAGE-XR) 500 MG 24 hr tablet; Take 4 tablets (2,000 mg total) by mouth daily with supper.  Dispense: 360 tablet; Refill: 3    2. Type 2 diabetes mellitus (H)  - glipiZIDE (GLUCOTROL XL) 10 MG 24 hr tablet; Take 1 tablet (10  mg total) by mouth 2 (two) times a day.  Dispense: 180 tablet; Refill: 3     If protocol passes may refill for 12 months if within 3 months of last provider visit (or a total of 15 months).          Failed - A1C in last 6 months    Hemoglobin A1c   Date Value Ref Range Status   01/03/2018 11.5 (H) 3.5 - 6.0 % Final              Failed - Microalbumin in last year     Microalbumin, Random Urine   Date Value Ref Range Status   06/13/2016 7.63 (H) 0.00 - 1.99 mg/dL Final                 Failed - Blood pressure in last year    BP Readings from Last 1 Encounters:   01/03/18 130/84            Failed - Serum creatinine in last year    Creatinine   Date Value Ref Range Status   01/03/2018 1.09 0.70 - 1.30 mg/dL Final

## 2021-06-24 NOTE — TELEPHONE ENCOUNTER
Left message to call back for: PT   Information to relay to patient:  Left message to call and schedule ofv with pcp.

## 2021-07-05 ENCOUNTER — RECORDS - HEALTHEAST (OUTPATIENT)
Dept: FAMILY MEDICINE | Facility: CLINIC | Age: 56
End: 2021-07-05

## 2021-07-05 NOTE — TELEPHONE ENCOUNTER
Telephone Encounter by Greg Knight CMA at 7/5/2021  7:37 AM     Author: Greg Knight CMA Service: -- Author Type: Certified Medical Assistant    Filed: 7/5/2021  7:38 AM Encounter Date: 7/3/2021 Status: Signed    : Greg Knight CMA (Certified Medical Assistant)       To covering provider, perhaps could wait for pcp; thank you

## 2021-07-27 DIAGNOSIS — F32.1 MAJOR DEPRESSIVE DISORDER, SINGLE EPISODE, MODERATE (H): ICD-10-CM

## 2021-07-27 RX ORDER — BUPROPION HYDROCHLORIDE 150 MG/1
150 TABLET ORAL EVERY MORNING
Qty: 30 TABLET | Refills: 0 | Status: SHIPPED | OUTPATIENT
Start: 2021-07-27 | End: 2022-02-07

## 2021-08-21 ENCOUNTER — HEALTH MAINTENANCE LETTER (OUTPATIENT)
Age: 56
End: 2021-08-21

## 2021-08-23 DIAGNOSIS — E11.9 DIABETES MELLITUS (H): ICD-10-CM

## 2021-08-23 DIAGNOSIS — E11.9 TYPE 2 DIABETES MELLITUS WITHOUT COMPLICATION, WITHOUT LONG-TERM CURRENT USE OF INSULIN (H): Primary | ICD-10-CM

## 2021-08-23 RX ORDER — METFORMIN HCL 500 MG
2000 TABLET, EXTENDED RELEASE 24 HR ORAL
Qty: 360 TABLET | Refills: 1 | Status: SHIPPED | OUTPATIENT
Start: 2021-08-23 | End: 2022-09-08

## 2021-09-14 DIAGNOSIS — E11.9 TYPE 2 DIABETES MELLITUS WITHOUT COMPLICATION, WITHOUT LONG-TERM CURRENT USE OF INSULIN (H): Primary | ICD-10-CM

## 2021-09-14 DIAGNOSIS — E11.9 TYPE 2 DIABETES MELLITUS (H): ICD-10-CM

## 2021-09-15 RX ORDER — GLIPIZIDE 10 MG/1
10 TABLET, FILM COATED, EXTENDED RELEASE ORAL 2 TIMES DAILY
Qty: 60 TABLET | Refills: 0 | Status: SHIPPED | OUTPATIENT
Start: 2021-09-15 | End: 2021-12-10

## 2021-10-16 ENCOUNTER — HEALTH MAINTENANCE LETTER (OUTPATIENT)
Age: 56
End: 2021-10-16

## 2021-12-10 DIAGNOSIS — E11.9 TYPE 2 DIABETES MELLITUS WITHOUT COMPLICATION, WITHOUT LONG-TERM CURRENT USE OF INSULIN (H): ICD-10-CM

## 2021-12-10 RX ORDER — GLIPIZIDE 10 MG/1
10 TABLET, FILM COATED, EXTENDED RELEASE ORAL 2 TIMES DAILY
Qty: 60 TABLET | Refills: 0 | Status: SHIPPED | OUTPATIENT
Start: 2021-12-10 | End: 2022-02-07

## 2022-01-24 ENCOUNTER — OFFICE VISIT (OUTPATIENT)
Dept: FAMILY MEDICINE | Facility: CLINIC | Age: 57
End: 2022-01-24
Payer: COMMERCIAL

## 2022-01-24 ENCOUNTER — TELEPHONE (OUTPATIENT)
Dept: LAB | Facility: CLINIC | Age: 57
End: 2022-01-24

## 2022-01-24 VITALS
BODY MASS INDEX: 28.56 KG/M2 | TEMPERATURE: 97.9 F | RESPIRATION RATE: 20 BRPM | HEART RATE: 96 BPM | OXYGEN SATURATION: 98 % | DIASTOLIC BLOOD PRESSURE: 80 MMHG | SYSTOLIC BLOOD PRESSURE: 124 MMHG | HEIGHT: 71 IN | WEIGHT: 204 LBS

## 2022-01-24 DIAGNOSIS — R29.898 RIGHT ARM WEAKNESS: Primary | ICD-10-CM

## 2022-01-24 DIAGNOSIS — E11.65 TYPE 2 DIABETES MELLITUS WITH HYPERGLYCEMIA, WITHOUT LONG-TERM CURRENT USE OF INSULIN (H): ICD-10-CM

## 2022-01-24 DIAGNOSIS — R27.8 DYSMETRIA: ICD-10-CM

## 2022-01-24 LAB
ERYTHROCYTE [DISTWIDTH] IN BLOOD BY AUTOMATED COUNT: 12.7 % (ref 10–15)
HBA1C MFR BLD: 14.7 % (ref 0–5.6)
HCT VFR BLD AUTO: 38.4 % (ref 40–53)
HGB BLD-MCNC: 13 G/DL (ref 13.3–17.7)
HOLD SPECIMEN: NORMAL
MCH RBC QN AUTO: 30.7 PG (ref 26.5–33)
MCHC RBC AUTO-ENTMCNC: 33.9 G/DL (ref 31.5–36.5)
MCV RBC AUTO: 91 FL (ref 78–100)
PLATELET # BLD AUTO: 307 10E3/UL (ref 150–450)
RBC # BLD AUTO: 4.23 10E6/UL (ref 4.4–5.9)
WBC # BLD AUTO: 10.3 10E3/UL (ref 4–11)

## 2022-01-24 PROCEDURE — 85027 COMPLETE CBC AUTOMATED: CPT | Performed by: FAMILY MEDICINE

## 2022-01-24 PROCEDURE — 93005 ELECTROCARDIOGRAM TRACING: CPT | Performed by: FAMILY MEDICINE

## 2022-01-24 PROCEDURE — 36415 COLL VENOUS BLD VENIPUNCTURE: CPT | Performed by: FAMILY MEDICINE

## 2022-01-24 PROCEDURE — 93010 ELECTROCARDIOGRAM REPORT: CPT | Performed by: INTERNAL MEDICINE

## 2022-01-24 PROCEDURE — 83036 HEMOGLOBIN GLYCOSYLATED A1C: CPT | Performed by: FAMILY MEDICINE

## 2022-01-24 PROCEDURE — 99214 OFFICE O/P EST MOD 30 MIN: CPT | Performed by: FAMILY MEDICINE

## 2022-01-24 RX ORDER — COVID-19 ANTIGEN TEST
220 KIT MISCELLANEOUS 2 TIMES DAILY WITH MEALS
COMMUNITY
End: 2022-02-07

## 2022-01-24 ASSESSMENT — ANXIETY QUESTIONNAIRES
5. BEING SO RESTLESS THAT IT IS HARD TO SIT STILL: NOT AT ALL
2. NOT BEING ABLE TO STOP OR CONTROL WORRYING: SEVERAL DAYS
GAD7 TOTAL SCORE: 4
7. FEELING AFRAID AS IF SOMETHING AWFUL MIGHT HAPPEN: NOT AT ALL
GAD7 TOTAL SCORE: 4
1. FEELING NERVOUS, ANXIOUS, OR ON EDGE: SEVERAL DAYS
3. WORRYING TOO MUCH ABOUT DIFFERENT THINGS: SEVERAL DAYS
4. TROUBLE RELAXING: NOT AT ALL
7. FEELING AFRAID AS IF SOMETHING AWFUL MIGHT HAPPEN: NOT AT ALL
GAD7 TOTAL SCORE: 4
6. BECOMING EASILY ANNOYED OR IRRITABLE: SEVERAL DAYS

## 2022-01-24 ASSESSMENT — PATIENT HEALTH QUESTIONNAIRE - PHQ9
SUM OF ALL RESPONSES TO PHQ QUESTIONS 1-9: 9
SUM OF ALL RESPONSES TO PHQ QUESTIONS 1-9: 9

## 2022-01-24 ASSESSMENT — MIFFLIN-ST. JEOR: SCORE: 1764.53

## 2022-01-24 NOTE — PROGRESS NOTES
"This patient is here for \"lab first A1C. I have drawn his blood and he is waiting to room, but I have no orders in the chart. Please address.  Thank You!    Jt Venegas has an upcoming lab appointment:    Future Appointments   Date Time Provider Department Center   1/24/2022 12:30 PM Kylie Armijo MD [id number]     Patient is scheduled for the following lab(s): unknown    There is no order available. Please review and place either future orders or HMPO (Review of Health Maintenance Protocol Orders), as appropriate.    Health Maintenance Due   Topic     ANNUAL REVIEW OF HM ORDERS      HIV SCREENING      HEPATITIS C SCREENING      LIPID      A1C      BMP      MICROALBUMIN      [place]    "

## 2022-01-24 NOTE — PROGRESS NOTES
Assessment/Plan:     Right arm weakness  Dysmetria  We discussed multiple factors for ischemic cerebrovascular event.  I am most concerned about this.  Is also possible that he has some cervical spine disease that is contributing, especially with his mild neck pain, therefore will evaluate further with MRI of brain, carotids, and cervical spine.  EKG is unrevealing for underlying cardiac etiology.  Will check CBC and vitamin B12 to look for other secondary neurologic contributing factors.  Further follow-up and recommendations pending results.  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  In the interim, continue statin, aspirin, blood pressure controlled.  - EKG 12-lead, tracing only  - CBC with platelets; Future  - Vitamin B12; Future    Type 2 diabetes mellitus with hyperglycemia, without long-term current use of insulin (H)  Very poor control.  I discussed my concerns about poorly controlled diabetes and importance of regular then visits to ensure we are getting things under better control.  At this time I think he would benefit from insulin and inform him of this.  Continue her medications, order placed for diabetes education for insulin initiation.  Will check comprehensive metabolic panel today and monitoring of kidney and liver function to determine whether he could have chronic kidney disease, will check vitamin B12 level while he is taking Metformin, will check cholesterol taking atorvastatin.  - Hemoglobin A1c; Standing  - Hemoglobin A1c  - Comprehensive metabolic panel; Future  - Vitamin B12; Future  - LDL cholesterol direct; Future    Please abstract the following data from this visit with this patient into the appropriate field in Epic:    Tests that can be patient reported without a hard copy:    Eye exam with ophthalmology on this date: 12/10/21 ([facility], YOVANI completed)  Note to [employer]: If this section  is blank, no results were found via Chart Review/Care Everywhere.      There are no Patient Instructions on file for this visit.     No follow-ups on file.      Subjective:      Jt Venegas is a 56 year old male presented to clinic today along with his wife for evaluation of sudden onset of right upper extremity symptoms when he awoke from sleep on the morning of January 20.  Gile as though his arm was not coordinated, sensation was at baseline, perhaps a little bit stronger paresthesias in the right fifth digit which has been longer-term, but sensation otherwise intact.  Felt that the message from his brain was not fully getting to his arm.  His arm was able to move some but just with less coordination.  Difficulty when reaching for things.  Reduced grasp.  Overall felt weaker.  He denies any other new neurologic symptoms including sudden vision changes, difficulty swallowing or speaking, new numbness, tingling, or weakness, new bowel or bladder changes.  Perhaps a little more clumsy when going up and down stairs.  No falls.  He had no injuries preceding it.  He has been compliant with his medications.  He has not been checking his blood sugars until yesterday morning at which time it was 385, has not otherwise been checking.  He has been taking his aspirin and atorvastatin daily.  No prior history of stroke.  He does have some intermittent neck pain that radiates to both shoulders, that is unchanged.  Known osteoarthritis of left shoulder.    Current Outpatient Medications   Medication Sig Dispense Refill     aspirin 81 MG EC tablet [ASPIRIN 81 MG EC TABLET] Take 81 mg by mouth daily.       atorvastatin (LIPITOR) 80 MG tablet [ATORVASTATIN (LIPITOR) 80 MG TABLET] Take 1 tablet (80 mg total) by mouth at bedtime. 90 tablet 1     blood-glucose meter Misc [BLOOD-GLUCOSE METER MISC] Use 1 Device As Directed 2 (two) times a day. 1 each 0     buPROPion (WELLBUTRIN XL) 150 MG 24 hr tablet Take 1 tablet (150 mg) by  mouth every morning 30 tablet 0     glipiZIDE (GLUCOTROL XL) 10 MG 24 hr tablet Take 1 tablet (10 mg) by mouth 2 times daily **OFFICE VISIT REQUIRED FOR FURTHER REFILLS** 60 tablet 0     lisinopriL (PRINIVIL,ZESTRIL) 10 MG tablet [LISINOPRIL (PRINIVIL,ZESTRIL) 10 MG TABLET] Take 1 tablet (10 mg total) by mouth daily. 90 tablet 2     metFORMIN (GLUCOPHAGE-XR) 500 MG 24 hr tablet Take 4 tablets (2,000 mg) by mouth daily (with dinner) 360 tablet 1     naproxen sodium 220 MG capsule Take 220 mg by mouth 2 times daily (with meals) As needed       pioglitazone (ACTOS) 30 MG tablet [PIOGLITAZONE (ACTOS) 30 MG TABLET] Take 0.5 tablets (15 mg total) by mouth daily. 90 tablet 2     sildenafiL (VIAGRA) 50 MG tablet [SILDENAFIL (VIAGRA) 50 MG TABLET] Take 1 tablet (50 mg total) by mouth as needed for erectile dysfunction. 20 tablet 11     TRUETRACK TEST strips [TRUETRACK TEST STRIPS] TEST BLOOD SUGAR TWICE DAILY OR AS DIRECTED. 200 strip 0       Past Medical History, Family History, and Social History reviewed.  No past medical history on file.  No past surgical history on file.  Aspirin (tartrazine only) [tartrazine] and Hydrochlorothiazide  No family history on file.  Social History     Socioeconomic History     Marital status:      Spouse name: Not on file     Number of children: Not on file     Years of education: Not on file     Highest education level: Not on file   Occupational History     Not on file   Tobacco Use     Smoking status: Current Every Day Smoker     Smokeless tobacco: Never Used   Substance and Sexual Activity     Alcohol use: Not on file     Drug use: Not on file     Sexual activity: Not on file   Other Topics Concern     Not on file   Social History Narrative     Not on file     Social Determinants of Health     Financial Resource Strain: Not on file   Food Insecurity: Not on file   Transportation Needs: Not on file   Physical Activity: Not on file   Stress: Not on file   Social Connections: Not on  "file   Intimate Partner Violence: Not on file   Housing Stability: Not on file         Review of systems is as stated in HPI, and the remainder of the 10 system review is otherwise negative.    Objective:     Vitals:    01/24/22 1225   BP: 124/80   Pulse: 96   Resp: 20   Temp: 97.9  F (36.6  C)   TempSrc: Oral   SpO2: 98%   Weight: 92.5 kg (204 lb)   Height: 1.791 m (5' 10.5\")    Body mass index is 28.86 kg/m .    Alert male.  Face moves symmetrically.  Pupils are equal, round, reactive to light.  Oropharynx is clear.  Speech is intact.  Neck supple without lymphadenopathy, thyromegaly, or carotid bruits.  He has 5-5 strength throughout his upper extremities bilaterally.  Reduced coordination with his right upper extremity when engaging his upper extremity for strength testing.  Deep tendon reflexes are minimally present throughout his upper and lower extremities bilaterally.  No clonus.  Normal tone.  Finger-nose-finger with mild dysmetria on the right-hand side only.  Mildly reduced coordination worse on the right than the left, patient is right-handed at baseline.  Good strength and coordination throughout his lower extremities.  Tandem gait intact.    I ordered and personally reviewed a twelve-lead EKG which reveals normal sinus rhythm.  He has isolated Q-wave inversion in lead aVF.  Mild left axis deviation.  No ischemic or arrhythmic changes.      This note has been dictated using voice recognition software. Any grammatical or context distortions are unintentional and inherent to the the software.     Answers for HPI/ROS submitted by the patient on 1/24/2022  PHQ9 TOTAL SCORE: 9  JUAN CARLOS 7 TOTAL SCORE: 4      "

## 2022-01-24 NOTE — LETTER
January 24, 2022      Jt Venegas  [address] [facility] [address]        To Whom It May Concern:    Jt Venegas  was seen on 1/24/2022.  Please excuse him from work from 1/21/2022 until further notice due to medical condition requiring further evaluation.        Sincerely,        Kylie Armijo MD

## 2022-01-25 ENCOUNTER — TELEPHONE (OUTPATIENT)
Dept: LAB | Facility: CLINIC | Age: 57
End: 2022-01-25
Payer: COMMERCIAL

## 2022-01-25 LAB
ATRIAL RATE - MUSE: 92 BPM
DIASTOLIC BLOOD PRESSURE - MUSE: NORMAL MMHG
INTERPRETATION ECG - MUSE: NORMAL
P AXIS - MUSE: 47 DEGREES
PR INTERVAL - MUSE: 192 MS
QRS DURATION - MUSE: 98 MS
QT - MUSE: 372 MS
QTC - MUSE: 460 MS
R AXIS - MUSE: -57 DEGREES
SYSTOLIC BLOOD PRESSURE - MUSE: NORMAL MMHG
T AXIS - MUSE: 38 DEGREES
VENTRICULAR RATE- MUSE: 92 BPM

## 2022-01-25 ASSESSMENT — ANXIETY QUESTIONNAIRES: GAD7 TOTAL SCORE: 4

## 2022-01-25 ASSESSMENT — PATIENT HEALTH QUESTIONNAIRE - PHQ9: SUM OF ALL RESPONSES TO PHQ QUESTIONS 1-9: 9

## 2022-01-25 NOTE — PROGRESS NOTES
This patient was drawn yesterday before his appt. It was an uncomplicated venipuncture; however, [facility] reports that his chemistry results are extremely erratic. Particularly his Na, K+ and calcium. They are reordering his Comp.

## 2022-01-28 ENCOUNTER — APPOINTMENT (OUTPATIENT)
Dept: CT IMAGING | Facility: CLINIC | Age: 57
End: 2022-01-28
Attending: EMERGENCY MEDICINE
Payer: COMMERCIAL

## 2022-01-28 ENCOUNTER — HOSPITAL ENCOUNTER (EMERGENCY)
Facility: CLINIC | Age: 57
Discharge: ANOTHER HOSPITAL | End: 2022-01-28
Attending: EMERGENCY MEDICINE | Admitting: EMERGENCY MEDICINE
Payer: COMMERCIAL

## 2022-01-28 VITALS
DIASTOLIC BLOOD PRESSURE: 69 MMHG | TEMPERATURE: 98.4 F | SYSTOLIC BLOOD PRESSURE: 139 MMHG | OXYGEN SATURATION: 98 % | HEART RATE: 94 BPM | RESPIRATION RATE: 16 BRPM | WEIGHT: 205 LBS | BODY MASS INDEX: 29 KG/M2

## 2022-01-28 DIAGNOSIS — I63.9 CEREBROVASCULAR ACCIDENT (CVA), UNSPECIFIED MECHANISM (H): ICD-10-CM

## 2022-01-28 DIAGNOSIS — I65.22 STENOSIS OF LEFT CAROTID ARTERY: ICD-10-CM

## 2022-01-28 LAB
ALBUMIN SERPL-MCNC: 3.6 G/DL (ref 3.5–5)
ALP SERPL-CCNC: 79 U/L (ref 45–120)
ALT SERPL W P-5'-P-CCNC: 20 U/L (ref 0–45)
ANION GAP SERPL CALCULATED.3IONS-SCNC: 11 MMOL/L (ref 5–18)
APTT PPP: 23 SECONDS (ref 22–38)
AST SERPL W P-5'-P-CCNC: 23 U/L (ref 0–40)
ATRIAL RATE - MUSE: 95 BPM
BASOPHILS # BLD AUTO: 0.1 10E3/UL (ref 0–0.2)
BASOPHILS NFR BLD AUTO: 0 %
BILIRUB SERPL-MCNC: 0.5 MG/DL (ref 0–1)
BUN SERPL-MCNC: 16 MG/DL (ref 8–22)
CALCIUM SERPL-MCNC: 9 MG/DL (ref 8.5–10.5)
CHLORIDE BLD-SCNC: 107 MMOL/L (ref 98–107)
CO2 SERPL-SCNC: 21 MMOL/L (ref 22–31)
CREAT SERPL-MCNC: 1.34 MG/DL (ref 0.7–1.3)
DIASTOLIC BLOOD PRESSURE - MUSE: 91 MMHG
EOSINOPHIL # BLD AUTO: 0.2 10E3/UL (ref 0–0.7)
EOSINOPHIL NFR BLD AUTO: 1 %
ERYTHROCYTE [DISTWIDTH] IN BLOOD BY AUTOMATED COUNT: 12.8 % (ref 10–15)
GFR SERPL CREATININE-BSD FRML MDRD: 62 ML/MIN/1.73M2
GLUCOSE BLD-MCNC: 276 MG/DL (ref 70–125)
HCT VFR BLD AUTO: 35.9 % (ref 40–53)
HGB BLD-MCNC: 11.9 G/DL (ref 13.3–17.7)
IMM GRANULOCYTES # BLD: 0.1 10E3/UL
IMM GRANULOCYTES NFR BLD: 1 %
INR PPP: 0.98 (ref 0.85–1.15)
INTERPRETATION ECG - MUSE: NORMAL
LYMPHOCYTES # BLD AUTO: 2 10E3/UL (ref 0.8–5.3)
LYMPHOCYTES NFR BLD AUTO: 14 %
MCH RBC QN AUTO: 30.3 PG (ref 26.5–33)
MCHC RBC AUTO-ENTMCNC: 33.1 G/DL (ref 31.5–36.5)
MCV RBC AUTO: 91 FL (ref 78–100)
MONOCYTES # BLD AUTO: 0.7 10E3/UL (ref 0–1.3)
MONOCYTES NFR BLD AUTO: 5 %
NEUTROPHILS # BLD AUTO: 11.8 10E3/UL (ref 1.6–8.3)
NEUTROPHILS NFR BLD AUTO: 79 %
NRBC # BLD AUTO: 0 10E3/UL
NRBC BLD AUTO-RTO: 0 /100
P AXIS - MUSE: 54 DEGREES
PLATELET # BLD AUTO: 349 10E3/UL (ref 150–450)
POTASSIUM BLD-SCNC: 4.1 MMOL/L (ref 3.5–5)
PR INTERVAL - MUSE: 210 MS
PROT SERPL-MCNC: 6.7 G/DL (ref 6–8)
QRS DURATION - MUSE: 104 MS
QT - MUSE: 394 MS
QTC - MUSE: 495 MS
R AXIS - MUSE: -37 DEGREES
RBC # BLD AUTO: 3.93 10E6/UL (ref 4.4–5.9)
SARS-COV-2 RNA RESP QL NAA+PROBE: NEGATIVE
SODIUM SERPL-SCNC: 139 MMOL/L (ref 136–145)
SYSTOLIC BLOOD PRESSURE - MUSE: 184 MMHG
T AXIS - MUSE: 46 DEGREES
TROPONIN I SERPL-MCNC: <0.01 NG/ML (ref 0–0.29)
VENTRICULAR RATE- MUSE: 95 BPM
WBC # BLD AUTO: 14.8 10E3/UL (ref 4–11)

## 2022-01-28 PROCEDURE — 85730 THROMBOPLASTIN TIME PARTIAL: CPT | Performed by: EMERGENCY MEDICINE

## 2022-01-28 PROCEDURE — 250N000011 HC RX IP 250 OP 636: Performed by: EMERGENCY MEDICINE

## 2022-01-28 PROCEDURE — 80053 COMPREHEN METABOLIC PANEL: CPT | Performed by: EMERGENCY MEDICINE

## 2022-01-28 PROCEDURE — 250N000013 HC RX MED GY IP 250 OP 250 PS 637: Performed by: EMERGENCY MEDICINE

## 2022-01-28 PROCEDURE — 96376 TX/PRO/DX INJ SAME DRUG ADON: CPT | Mod: 59

## 2022-01-28 PROCEDURE — 85025 COMPLETE CBC W/AUTO DIFF WBC: CPT | Performed by: EMERGENCY MEDICINE

## 2022-01-28 PROCEDURE — C9803 HOPD COVID-19 SPEC COLLECT: HCPCS

## 2022-01-28 PROCEDURE — 99285 EMERGENCY DEPT VISIT HI MDM: CPT | Mod: 25

## 2022-01-28 PROCEDURE — 36415 COLL VENOUS BLD VENIPUNCTURE: CPT | Performed by: EMERGENCY MEDICINE

## 2022-01-28 PROCEDURE — 84484 ASSAY OF TROPONIN QUANT: CPT | Performed by: EMERGENCY MEDICINE

## 2022-01-28 PROCEDURE — 93005 ELECTROCARDIOGRAM TRACING: CPT | Performed by: EMERGENCY MEDICINE

## 2022-01-28 PROCEDURE — 96374 THER/PROPH/DIAG INJ IV PUSH: CPT | Mod: 59

## 2022-01-28 PROCEDURE — 70498 CT ANGIOGRAPHY NECK: CPT

## 2022-01-28 PROCEDURE — 70496 CT ANGIOGRAPHY HEAD: CPT

## 2022-01-28 PROCEDURE — 96375 TX/PRO/DX INJ NEW DRUG ADDON: CPT

## 2022-01-28 PROCEDURE — 87635 SARS-COV-2 COVID-19 AMP PRB: CPT | Performed by: EMERGENCY MEDICINE

## 2022-01-28 PROCEDURE — 85610 PROTHROMBIN TIME: CPT | Performed by: EMERGENCY MEDICINE

## 2022-01-28 RX ORDER — CLOPIDOGREL BISULFATE 75 MG/1
300 TABLET ORAL ONCE
Status: COMPLETED | OUTPATIENT
Start: 2022-01-28 | End: 2022-01-28

## 2022-01-28 RX ORDER — ASPIRIN 325 MG
325 TABLET ORAL ONCE
Status: COMPLETED | OUTPATIENT
Start: 2022-01-28 | End: 2022-01-28

## 2022-01-28 RX ORDER — HYDROMORPHONE HYDROCHLORIDE 1 MG/ML
0.5 INJECTION, SOLUTION INTRAMUSCULAR; INTRAVENOUS; SUBCUTANEOUS
Status: COMPLETED | OUTPATIENT
Start: 2022-01-28 | End: 2022-01-28

## 2022-01-28 RX ORDER — IOPAMIDOL 755 MG/ML
100 INJECTION, SOLUTION INTRAVASCULAR ONCE
Status: COMPLETED | OUTPATIENT
Start: 2022-01-28 | End: 2022-01-28

## 2022-01-28 RX ORDER — METOCLOPRAMIDE HYDROCHLORIDE 5 MG/ML
10 INJECTION INTRAMUSCULAR; INTRAVENOUS ONCE
Status: COMPLETED | OUTPATIENT
Start: 2022-01-28 | End: 2022-01-28

## 2022-01-28 RX ADMIN — HYDROMORPHONE HYDROCHLORIDE 0.5 MG: 1 INJECTION, SOLUTION INTRAMUSCULAR; INTRAVENOUS; SUBCUTANEOUS at 06:43

## 2022-01-28 RX ADMIN — IOPAMIDOL 75 ML: 755 INJECTION, SOLUTION INTRAVENOUS at 02:07

## 2022-01-28 RX ADMIN — HYDROMORPHONE HYDROCHLORIDE 0.5 MG: 1 INJECTION, SOLUTION INTRAMUSCULAR; INTRAVENOUS; SUBCUTANEOUS at 10:58

## 2022-01-28 RX ADMIN — METOCLOPRAMIDE 10 MG: 5 INJECTION, SOLUTION INTRAMUSCULAR; INTRAVENOUS at 00:42

## 2022-01-28 RX ADMIN — CLOPIDOGREL BISULFATE 300 MG: 75 TABLET, FILM COATED ORAL at 03:38

## 2022-01-28 RX ADMIN — HYDROMORPHONE HYDROCHLORIDE 0.5 MG: 1 INJECTION, SOLUTION INTRAMUSCULAR; INTRAVENOUS; SUBCUTANEOUS at 05:35

## 2022-01-28 RX ADMIN — ASPIRIN 325 MG ORAL TABLET 325 MG: 325 PILL ORAL at 03:29

## 2022-01-28 RX ADMIN — HYDROMORPHONE HYDROCHLORIDE 0.5 MG: 1 INJECTION, SOLUTION INTRAMUSCULAR; INTRAVENOUS; SUBCUTANEOUS at 13:33

## 2022-01-28 ASSESSMENT — ACTIVITIES OF DAILY LIVING (ADL): DEPENDENT_IADLS:: INDEPENDENT

## 2022-01-28 ASSESSMENT — ENCOUNTER SYMPTOMS
CONFUSION: 1
CHEST TIGHTNESS: 0
PHOTOPHOBIA: 1
ABDOMINAL PAIN: 0
VOMITING: 1
WEAKNESS: 0
HEADACHES: 1
SHORTNESS OF BREATH: 0
NUMBNESS: 1

## 2022-01-28 NOTE — PHARMACY-ADMISSION MEDICATION HISTORY
Pharmacy Note - Admission Medication History    Pertinent Provider Information:      ______________________________________________________________________    Prior To Admission (PTA) med list completed and updated in EMR.       PTA Med List   Medication Sig Last Dose     aspirin 81 MG EC tablet [ASPIRIN 81 MG EC TABLET] Take 81 mg by mouth daily. 1/28/2022 at had 325mg x1 ED     atorvastatin (LIPITOR) 80 MG tablet [ATORVASTATIN (LIPITOR) 80 MG TABLET] Take 1 tablet (80 mg total) by mouth at bedtime. 1/27/2022 at Unknown time     buPROPion (WELLBUTRIN XL) 150 MG 24 hr tablet Take 1 tablet (150 mg) by mouth every morning 1/27/2022 at Unknown time     glipiZIDE (GLUCOTROL XL) 10 MG 24 hr tablet Take 1 tablet (10 mg) by mouth 2 times daily **OFFICE VISIT REQUIRED FOR FURTHER REFILLS** 1/27/2022 at Unknown time     lisinopriL (PRINIVIL,ZESTRIL) 10 MG tablet [LISINOPRIL (PRINIVIL,ZESTRIL) 10 MG TABLET] Take 1 tablet (10 mg total) by mouth daily. 1/27/2022 at Unknown time     metFORMIN (GLUCOPHAGE-XR) 500 MG 24 hr tablet Take 4 tablets (2,000 mg) by mouth daily (with dinner) (Patient taking differently: Take 1,000 mg by mouth 2 times daily (with meals) ) 1/27/2022 at Unknown time     naproxen sodium 220 MG capsule Take 220 mg by mouth 2 times daily (with meals) As needed Past Month at Unknown time     pioglitazone (ACTOS) 30 MG tablet [PIOGLITAZONE (ACTOS) 30 MG TABLET] Take 0.5 tablets (15 mg total) by mouth daily. 1/27/2022 at Unknown time     sildenafiL (VIAGRA) 50 MG tablet [SILDENAFIL (VIAGRA) 50 MG TABLET] Take 1 tablet (50 mg total) by mouth as needed for erectile dysfunction. 1/28/2022 at prn       Information source(s): Patient  Method of interview communication: in-person    Summary of Changes to PTA Med List  New:   Discontinued:   Changed:     Patient was asked about OTC/herbal products specifically.  PTA med list reflects this.    In the past week, patient estimated taking medication this percent of the time:   greater than 90%.    Allergies were reviewed, assessed, and updated with the patient.      Patient does not use any multi-dose medications prior to admission.    The information provided in this note is only as accurate as the sources available at the time of the update(s).    Thank you for the opportunity to participate in the care of this patient.    Marbella Alcala PharmD  1/28/2022 11:03 AM

## 2022-01-28 NOTE — ED PROVIDER NOTES
ED SIGNOUT  Date/Time:1/28/2022 2:09 PM    Patient signed out to me by my colleague, Kiel Bryant DO. Please see their note for complete history and physical. Pending transfer to neuro bed.     The creation of this record is based on the scribe s observations of the work being performed by Dr. Meneses and the provider s statements to them. It was created on their behalf by Sara Behmanesh a trained medical scribe. This document has been checked and approved by the attending provider.      REMAINING ED WORKUP:  Transfer for neurology services    Vitals:  BP (!) 152/76 (BP Location: Left arm, Patient Position: Semi-[place]'s)   Pulse 83   Temp 98.4  F (36.9  C) (Oral)   Resp 16   Wt 93 kg (205 lb)   SpO2 98%   BMI 29.00 kg/m        Pertinent labs results reviewed   Results for orders placed or performed during the hospital encounter of 01/28/22   CTA Head Neck with Contrast    Impression     IMPRESSION:   HEAD CT:  1.  High convexity left parietal lobe small area of cortical hypoattenuation concerning for acute infarct. This is near the watershed Junction between the left LOUIE and left MCA territory.  2.  No acute intracranial hemorrhage.  3.  No midline shift.    HEAD CTA:   1.  Left MCA bifurcation aneurysm measuring 4 mm x 3 mm.     2.  Right distal carotid siphon moderate stenosis.    3.  Left distal cavernous ICA mild stenosis. Left distal carotid siphon moderate to severe stenosis.    4.  Otherwise, no significant stenosis/occlusion.    To consult with one of our [facility] Neurointerventional experts or schedule a formal appointment, please contact our clinic, call center, or on-call physician.    [facility] Neurointerventional Clinic: [phone]  [facility] Call Center: [phone]  On-call MD: [phone] (pager)      NECK CTA:  1.  Atherosclerotic plaque results in critical, 90% or greater, stenosis in the left carotid bulb with only a thin wisp of enhancement.     2.  Atherosclerotic plaque results in 50-70%  stenosis in the right carotid bulb.     3.  Bilateral carotid bifurcations demonstrate moderate stenoses.    4.  Right vertebral artery origin severe stenosis.        DR EVANGELINA GEE was notified by Dr Maulik Chavez at  2:57 AM 01/20/2022.     Comprehensive metabolic panel   Result Value Ref Range    Sodium 139 136 - 145 mmol/L    Potassium 4.1 3.5 - 5.0 mmol/L    Chloride 107 98 - 107 mmol/L    Carbon Dioxide (CO2) 21 (L) 22 - 31 mmol/L    Anion Gap 11 5 - 18 mmol/L    Urea Nitrogen 16 8 - 22 mg/dL    Creatinine 1.34 (H) 0.70 - 1.30 mg/dL    Calcium 9.0 8.5 - 10.5 mg/dL    Glucose 276 (H) 70 - 125 mg/dL    Alkaline Phosphatase 79 45 - 120 U/L    AST 23 0 - 40 U/L    ALT 20 0 - 45 U/L    Protein Total 6.7 6.0 - 8.0 g/dL    Albumin 3.6 3.5 - 5.0 g/dL    Bilirubin Total 0.5 0.0 - 1.0 mg/dL    GFR Estimate 62 >60 mL/min/1.73m2   Result Value Ref Range    Troponin I <0.01 0.00 - 0.29 ng/mL   Result Value Ref Range    INR 0.98 0.85 - 1.15   Partial thromboplastin time   Result Value Ref Range    aPTT 23 22 - 38 Seconds   CBC with platelets and differential   Result Value Ref Range    WBC Count 14.8 (H) 4.0 - 11.0 10e3/uL    RBC Count 3.93 (L) 4.40 - 5.90 10e6/uL    Hemoglobin 11.9 (L) 13.3 - 17.7 g/dL    Hematocrit 35.9 (L) 40.0 - 53.0 %    MCV 91 78 - 100 fL    MCH 30.3 26.5 - 33.0 pg    MCHC 33.1 31.5 - 36.5 g/dL    RDW 12.8 10.0 - 15.0 %    Platelet Count 349 150 - 450 10e3/uL    % Neutrophils 79 %    % Lymphocytes 14 %    % Monocytes 5 %    % Eosinophils 1 %    % Basophils 0 %    % Immature Granulocytes 1 %    NRBCs per 100 WBC 0 <1 /100    Absolute Neutrophils 11.8 (H) 1.6 - 8.3 10e3/uL    Absolute Lymphocytes 2.0 0.8 - 5.3 10e3/uL    Absolute Monocytes 0.7 0.0 - 1.3 10e3/uL    Absolute Eosinophils 0.2 0.0 - 0.7 10e3/uL    Absolute Basophils 0.1 0.0 - 0.2 10e3/uL    Absolute Immature Granulocytes 0.1 <=0.4 10e3/uL    Absolute NRBCs 0.0 10e3/uL   Asymptomatic COVID-19 Virus (Coronavirus) by PCR Nasopharyngeal     Specimen: Nasopharyngeal; Swab   Result Value Ref Range    SARS CoV2 PCR Negative Negative   ECG 12-LEAD WITH MUSE (LHE)   Result Value Ref Range    Systolic Blood Pressure 184 mmHg    Diastolic Blood Pressure 91 mmHg    Ventricular Rate 95 BPM    Atrial Rate 95 BPM    NM Interval 210 ms    QRS Duration 104 ms     ms    QTc 495 ms    P Axis 54 degrees    R AXIS -37 degrees    T Axis 46 degrees    Interpretation ECG       Sinus rhythm with 1st degree A-V block  Left axis deviation  Pulmonary disease pattern  Prolonged QT  Abnormal ECG  When compared with ECG of 24-JAN-2022 13:11,  No significant change was found  Confirmed by SEE ED PROVIDER NOTE FOR, ECG INTERPRETATION ([id number]),  AMIRA AMADOR ([id number]) on 1/28/2022 4:32:15 AM         Pertinent imaging reviewed   Please see official radiology report.  CTA Head Neck with Contrast   Final Result    IMPRESSION:    HEAD CT:   1.  High convexity left parietal lobe small area of cortical hypoattenuation concerning for acute infarct. This is near the watershed Junction between the left LOUIE and left MCA territory.   2.  No acute intracranial hemorrhage.   3.  No midline shift.      HEAD CTA:    1.  Left MCA bifurcation aneurysm measuring 4 mm x 3 mm.       2.  Right distal carotid siphon moderate stenosis.      3.  Left distal cavernous ICA mild stenosis. Left distal carotid siphon moderate to severe stenosis.      4.  Otherwise, no significant stenosis/occlusion.      To consult with one of our [facility] Neurointerventional experts or schedule a formal appointment, please contact our clinic, call center, or on-call physician.      [facility] Neurointerventional Clinic: [phone]   [facility] Call Center: [phone]   On-call MD: [phone] (pager)         NECK CTA:   1.  Atherosclerotic plaque results in critical, 90% or greater, stenosis in the left carotid bulb with only a thin wisp of enhancement.       2.  Atherosclerotic plaque results in 50-70% stenosis in the  right carotid bulb.       3.  Bilateral carotid bifurcations demonstrate moderate stenoses.      4.  Right vertebral artery origin severe stenosis.            DR EVANGELINA GEE was notified by Dr Maulik Chavez at  2:57 AM 01/20/2022.              Interventions  Medications   metoclopramide (REGLAN) injection 10 mg (10 mg Intravenous Given 1/28/22 0042)   iopamidol (ISOVUE-370) solution 100 mL (75 mLs Intravenous Given 1/28/22 0207)   aspirin (ASA) tablet 325 mg (325 mg Oral Given 1/28/22 0329)   clopidogrel (PLAVIX) tablet 300 mg (300 mg Oral Given 1/28/22 0338)   HYDROmorphone (PF) (DILAUDID) injection 0.5 mg (0.5 mg Intravenous Given 1/28/22 1058)   HYDROmorphone (PF) (DILAUDID) injection 0.5 mg (0.5 mg Intravenous Given 1/28/22 1333)        ED Course/MDM:  2:10 PM Signout accepted from Dr. Bryant.  Prior records were reviewed.  Diagnostics from this visit are reviewed  3:39 PM I spoke with Dr. Knight, [facility], who accepts patient for admission. He does want their neurology service to touch base with me first, so access center there is paging them.  3:49 PM Pt updated, signed EMTALA, all questions answered.  4:11 PM I spoke with neurologist, Dr. Anthony Mcclelland, from [facility] regarding pt and imaging.  He is comfortable for transfer.  He did request imaging be pushed to them, which I asked [facility] to make sure is done.  No additional requests from accepting physicians.             1. Cerebrovascular accident (CVA), unspecified mechanism (H)    2. Stenosis of left carotid artery                 Santi Meneses MD  01/28/22 3556

## 2022-01-28 NOTE — ED PROVIDER NOTES
EMERGENCY DEPARTMENT SIGN OUT NOTE        ED COURSE AND MEDICAL DECISION MAKING  Patient was signed out to me by Dr Patrick Florez at 6:00 AM.     In brief, Jt Venegas is a 56 year old male who initially presented for evaluation of nausea, vomiting, and headache. Patient woke up late last night with nausea, vomiting x1, diffuse headache, photophobia, felt disoriented, and had blurred vision briefly. Currently feels improved, although still has a diffuse headache and photophobia. Recently developed numbness in right hand, saw his PCP, and was evaluated for possible stroke, but numbness still present.     At time of sign out, disposition was pending transfer to neuro bed. Patient has remained stable in the ED.      FINAL IMPRESSION    1. Cerebrovascular accident (CVA), unspecified mechanism (H)    2. Stenosis of left carotid artery        ED MEDS  Medications   HYDROmorphone (PF) (DILAUDID) injection 0.5 mg (0.5 mg Intravenous Given 1/28/22 0643)   metoclopramide (REGLAN) injection 10 mg (10 mg Intravenous Given 1/28/22 0042)   iopamidol (ISOVUE-370) solution 100 mL (75 mLs Intravenous Given 1/28/22 0207)   aspirin (ASA) tablet 325 mg (325 mg Oral Given 1/28/22 0329)   clopidogrel (PLAVIX) tablet 300 mg (300 mg Oral Given 1/28/22 0338)       LAB  Labs Ordered and Resulted from Time of ED Arrival to Time of ED Departure   COMPREHENSIVE METABOLIC PANEL - Abnormal       Result Value    Sodium 139      Potassium 4.1      Chloride 107      Carbon Dioxide (CO2) 21 (*)     Anion Gap 11      Urea Nitrogen 16      Creatinine 1.34 (*)     Calcium 9.0      Glucose 276 (*)     Alkaline Phosphatase 79      AST 23      ALT 20      Protein Total 6.7      Albumin 3.6      Bilirubin Total 0.5      GFR Estimate 62     CBC WITH PLATELETS AND DIFFERENTIAL - Abnormal    WBC Count 14.8 (*)     RBC Count 3.93 (*)     Hemoglobin 11.9 (*)     Hematocrit 35.9 (*)     MCV 91      MCH 30.3      MCHC 33.1      RDW 12.8      Platelet Count  349      % Neutrophils 79      % Lymphocytes 14      % Monocytes 5      % Eosinophils 1      % Basophils 0      % Immature Granulocytes 1      NRBCs per 100 WBC 0      Absolute Neutrophils 11.8 (*)     Absolute Lymphocytes 2.0      Absolute Monocytes 0.7      Absolute Eosinophils 0.2      Absolute Basophils 0.1      Absolute Immature Granulocytes 0.1      Absolute NRBCs 0.0     TROPONIN I - Normal    Troponin I <0.01     INR - Normal    INR 0.98     PARTIAL THROMBOPLASTIN TIME - Normal    aPTT 23     COVID-19 VIRUS (CORONAVIRUS) BY PCR - Normal    SARS CoV2 PCR Negative         RADIOLOGY    CTA Head Neck with Contrast   Final Result    IMPRESSION:    HEAD CT:   1.  High convexity left parietal lobe small area of cortical hypoattenuation concerning for acute infarct. This is near the watershed Junction between the left LOUIE and left MCA territory.   2.  No acute intracranial hemorrhage.   3.  No midline shift.      HEAD CTA:    1.  Left MCA bifurcation aneurysm measuring 4 mm x 3 mm.       2.  Right distal carotid siphon moderate stenosis.      3.  Left distal cavernous ICA mild stenosis. Left distal carotid siphon moderate to severe stenosis.      4.  Otherwise, no significant stenosis/occlusion.      To consult with one of our [facility] Neurointerventional experts or schedule a formal appointment, please contact our clinic, call center, or on-call physician.      [facility] Neurointerventional Clinic: [phone]   [facility] Call Center: [phone]   On-call MD: [phone] (pager)         NECK CTA:   1.  Atherosclerotic plaque results in critical, 90% or greater, stenosis in the left carotid bulb with only a thin wisp of enhancement.       2.  Atherosclerotic plaque results in 50-70% stenosis in the right carotid bulb.       3.  Bilateral carotid bifurcations demonstrate moderate stenoses.      4.  Right vertebral artery origin severe stenosis.            DR EVANGELINA GEE was notified by Dr Maulik Chavez at  2:57 AM  01/20/2022.             DISCHARGE MEDS  New Prescriptions    No medications on file       Kiel Bryant D.O.  Emergency Medicine  [facility] EMERGENCY ROOM  [address] [place] [id number] [phone]  Dept: [phone]     Kiel Bryant,   01/28/22 2333

## 2022-01-28 NOTE — ED PROVIDER NOTES
EMERGENCY DEPARTMENT ENCOUNTER      NAME: Jt Venegas  AGE: 56 year old male  YOB: 1965  MRN: [id number]  EVALUATION DATE & TIME: 1/28/2022 12:11 AM    PCP: Kylie Armijo    ED PROVIDER: Patrick Florez M.D.      Chief Complaint   Patient presents with     Syncope     Headache     Dizziness     Nausea         FINAL IMPRESSION:  1. Cerebrovascular accident (CVA), unspecified mechanism (H)    2. Stenosis of left carotid artery          ED COURSE & MEDICAL DECISION MAKING:    Pertinent Labs & Imaging studies reviewed. (See chart for details)  56 year old male presents to the Emergency Department for evaluation of headache.  Patient also has been having some intermittent right-sided weakness in his hand.  Did do a CT CTA.  This unfortunately does show a small left subacute infarct.  Also has severe left carotid stenosis in addition to a left MCA bifurcation aneurysm.  Discussed with stroke neurology.  Patient will need to be transferred to another facility.  He is out of the timeframe for thrombolytics.  Given aspirin and Plavix here.  [facility] to find a neurologic bed.    12:24 AM I met with the patient to gather history and to perform my initial exam. I discussed the plan for care while in the Emergency Department. PPE: Provider wore goggles and surgical mask.   3:29 AM I discussed the case with stroke neurology.   3:31 AM I reassessed the patient and updated him on the results. I discussed admission with the patient. Patient is agreeable. All questions answered fully.     At the conclusion of the encounter I discussed the results of all of the tests and the disposition. The questions were answered. The patient or family acknowledged understanding and was agreeable with the care plan.           MEDICATIONS GIVEN IN THE EMERGENCY:  Medications   HYDROmorphone (PF) (DILAUDID) injection 0.5 mg (0.5 mg Intravenous Given 1/28/22 0535)    metoclopramide (REGLAN) injection 10 mg (10 mg Intravenous Given 1/28/22 0042)   iopamidol (ISOVUE-370) solution 100 mL (75 mLs Intravenous Given 1/28/22 0207)   aspirin (ASA) tablet 325 mg (325 mg Oral Given 1/28/22 0329)   clopidogrel (PLAVIX) tablet 300 mg (300 mg Oral Given 1/28/22 0338)       NEW PRESCRIPTIONS STARTED AT TODAY'S ER VISIT  New Prescriptions    No medications on file          =================================================================    HPI    Patient information was obtained from: patient    Use of : N/A         Jt Venegas is a 56 year old male with a pertinent history of hypertension, hyperlipidemia, DM2 who presents to this ED via EMS for evaluation of nausea, vomiting, and headache.     Patient was feeling fine throughout the day yesterday (1/27), went to bed around 1900, and woke up late last night with nausea, vomiting x1, diffuse headache, photophobia, felt disoriented, and had blurred vision briefly. No loss of consciousness. Currently feels improved, although still has a diffuse headache and photophobia. Recently developed numbness in right hand, saw his PCP, and was evaluated for possible stroke, but numbness still present. No new focal/unilateral weakness. No recent medication changes. Otherwise denies chest pain, shortness of breath, abdominal pain, current pain, or any other complaints at this time. No known sick contacts.       REVIEW OF SYSTEMS   Review of Systems   Eyes: Positive for photophobia and visual disturbance (blurred; since resolved).   Respiratory: Negative for chest tightness and shortness of breath.    Cardiovascular: Negative for chest pain.   Gastrointestinal: Positive for vomiting (x1). Negative for abdominal pain.   Neurological: Positive for numbness (right hand; ongoing) and headaches (diffuse). Negative for syncope and weakness.   Psychiatric/Behavioral: Positive for confusion (disoriented).   All other systems reviewed and are  negative.       PAST MEDICAL HISTORY:  History reviewed. No pertinent past medical history.    PAST SURGICAL HISTORY:  History reviewed. No pertinent surgical history.        CURRENT MEDICATIONS:    Current Facility-Administered Medications   Medication     HYDROmorphone (PF) (DILAUDID) injection 0.5 mg     Current Outpatient Medications   Medication     aspirin 81 MG EC tablet     atorvastatin (LIPITOR) 80 MG tablet     blood-glucose meter Misc     buPROPion (WELLBUTRIN XL) 150 MG 24 hr tablet     glipiZIDE (GLUCOTROL XL) 10 MG 24 hr tablet     lisinopriL (PRINIVIL,ZESTRIL) 10 MG tablet     metFORMIN (GLUCOPHAGE-XR) 500 MG 24 hr tablet     naproxen sodium 220 MG capsule     pioglitazone (ACTOS) 30 MG tablet     sildenafiL (VIAGRA) 50 MG tablet     TRUETRACK TEST strips         ALLERGIES:  Allergies   Allergen Reactions     Aspirin (Tartrazine Only) [Tartrazine] Hives     Hydrochlorothiazide Nausea       FAMILY HISTORY:  History reviewed. No pertinent family history.    SOCIAL HISTORY:   Social History     Socioeconomic History     Marital status:      Spouse name: Not on file     Number of children: Not on file     Years of education: Not on file     Highest education level: Not on file   Occupational History     Not on file   Tobacco Use     Smoking status: Current Every Day Smoker     Smokeless tobacco: Never Used   Substance and Sexual Activity     Alcohol use: Not on file     Drug use: Not on file     Sexual activity: Not on file   Other Topics Concern     Not on file   Social History Narrative     Not on file     Social Determinants of Health     Financial Resource Strain: Not on file   Food Insecurity: Not on file   Transportation Needs: Not on file   Physical Activity: Not on file   Stress: Not on file   Social Connections: Not on file   Intimate Partner Violence: Not on file   Housing Stability: Not on file       VITALS:  BP (!) 150/78   Pulse 108   Temp 98.1  F (36.7  C) (Oral)   Resp 20   Wt 93  kg (205 lb)   SpO2 93%   BMI 29.00 kg/m      PHYSICAL EXAM    Physical Exam  Constitutional:       General: He is not in acute distress.     Appearance: He is not diaphoretic.   HENT:      Head: Atraumatic.   Eyes:      General: No scleral icterus.     Pupils: Pupils are equal, round, and reactive to light.   Cardiovascular:      Heart sounds: Normal heart sounds.   Pulmonary:      Effort: No respiratory distress.      Breath sounds: Normal breath sounds.   Abdominal:      General: Bowel sounds are normal.      Palpations: Abdomen is soft.      Tenderness: There is no abdominal tenderness.   Musculoskeletal:         General: No tenderness.   Skin:     General: Skin is warm.      Findings: No rash.   Neurological:      Comments: Mild weakness in the right hand .  Otherwise 5 out of 5 strength in bilateral upper and lower extremities.  Sensation intact in all 4 extremes.  Cranial nerves intact.  No pronator drift         LAB:  All pertinent labs reviewed and interpreted.  Labs Ordered and Resulted from Time of ED Arrival to Time of ED Departure   COMPREHENSIVE METABOLIC PANEL - Abnormal       Result Value    Sodium 139      Potassium 4.1      Chloride 107      Carbon Dioxide (CO2) 21 (*)     Anion Gap 11      Urea Nitrogen 16      Creatinine 1.34 (*)     Calcium 9.0      Glucose 276 (*)     Alkaline Phosphatase 79      AST 23      ALT 20      Protein Total 6.7      Albumin 3.6      Bilirubin Total 0.5      GFR Estimate 62     CBC WITH PLATELETS AND DIFFERENTIAL - Abnormal    WBC Count 14.8 (*)     RBC Count 3.93 (*)     Hemoglobin 11.9 (*)     Hematocrit 35.9 (*)     MCV 91      MCH 30.3      MCHC 33.1      RDW 12.8      Platelet Count 349      % Neutrophils 79      % Lymphocytes 14      % Monocytes 5      % Eosinophils 1      % Basophils 0      % Immature Granulocytes 1      NRBCs per 100 WBC 0      Absolute Neutrophils 11.8 (*)     Absolute Lymphocytes 2.0      Absolute Monocytes 0.7      Absolute Eosinophils  0.2      Absolute Basophils 0.1      Absolute Immature Granulocytes 0.1      Absolute NRBCs 0.0     TROPONIN I - Normal    Troponin I <0.01     INR - Normal    INR 0.98     PARTIAL THROMBOPLASTIN TIME - Normal    aPTT 23     COVID-19 VIRUS (CORONAVIRUS) BY PCR - Normal    SARS CoV2 PCR Negative         RADIOLOGY:  Reviewed all pertinent imaging. Please see official radiology report.  CTA Head Neck with Contrast   Final Result    IMPRESSION:    HEAD CT:   1.  High convexity left parietal lobe small area of cortical hypoattenuation concerning for acute infarct. This is near the watershed Junction between the left LOUIE and left MCA territory.   2.  No acute intracranial hemorrhage.   3.  No midline shift.      HEAD CTA:    1.  Left MCA bifurcation aneurysm measuring 4 mm x 3 mm.       2.  Right distal carotid siphon moderate stenosis.      3.  Left distal cavernous ICA mild stenosis. Left distal carotid siphon moderate to severe stenosis.      4.  Otherwise, no significant stenosis/occlusion.      To consult with one of our [facility] Neurointerventional experts or schedule a formal appointment, please contact our clinic, call center, or on-call physician.      [facility] Neurointerventional Clinic: [phone]   [facility] Call Center: [phone]   On-call MD: [phone] (pager)         NECK CTA:   1.  Atherosclerotic plaque results in critical, 90% or greater, stenosis in the left carotid bulb with only a thin wisp of enhancement.       2.  Atherosclerotic plaque results in 50-70% stenosis in the right carotid bulb.       3.  Bilateral carotid bifurcations demonstrate moderate stenoses.      4.  Right vertebral artery origin severe stenosis.            DR EVANGELINA GEE was notified by Dr Maulik Chavez at  2:57 AM 01/20/2022.             EKG:    Performed at: 0013  Impression: Rhythm with first-degree AV block.  Left axis.  When compared to previous dated January 24, 2022 no significant change.  Sinus rhythm ventricular to 95.   NM 1-10.  .  QTc 495    I have independently reviewed and interpreted the EKG(s) documented above.        I, Nohemi Cesar, am serving as a scribe to document services personally performed by Dr. Patrick Florez, based on my observation and the provider's statements to me. I, Patrick Florez MD attest that Nohemi Cesar is acting in a scribe capacity, has observed my performance of the services and has documented them in accordance with my direction.    Patrick Florez M.D.  Emergency Medicine  [facility] EMERGENCY ROOM  [address] [facility] [address]  [phone]  Dept: [phone]     Patrick Florez MD  01/28/22 0632

## 2022-01-28 NOTE — ED NOTES
At 0618, patients monitor noted probe off. 's, then ebony into upper 40's then back to 100's. Writer into patients room, urinal with 700 ml on floor at base of bed. Patient reports he sat at edge of bed to void and bent down to set urinal on floor. Monitor page printed and placed on chart. Notified MD

## 2022-01-28 NOTE — CONSULTS
Care Management Initial Consult    General Information  Assessment completed with: Patient, Met with patient in ED room  Type of CM/SW Visit: Initial Assessment    Primary Care Provider verified and updated as needed: Yes   Readmission within the last 30 days: no previous admission in last 30 days         Advance Care Planning: Advance Care Planning Reviewed: no concerns identified (He declined information at this time.)          Communication Assessment  Patient's communication style: spoken language (English or Bilingual)    Hearing Difficulty or Deaf: no   Wear Glasses or Blind: yes    Cognitive  Cognitive/Neuro/Behavioral: .WDL except,orientation  Level of Consciousness: alert  Arousal Level: arouses to voice  Orientation: disoriented to,time        Speech: clear    Living Environment:   People in home: child(tonia), adult,spouse  Wife and two sons.  Current living Arrangements: mobile home      Able to return to prior arrangements: other (see comments) (To be determined.)       Family/Social Support:  Care provided by: self  Provides care for: no one  Marital Status:    (Wife Cami and sons.)          Description of Support System: Supportive         Current Resources:   Patient receiving home care services: No     Community Resources: None  Equipment currently used at home: none  Supplies currently used at home: None    Employment/Financial:  Employment Status: seasonal worker        Financial Concerns: No concerns identified   Referral to Financial Counselor: No       Lifestyle & Psychosocial Needs:  Social Determinants of Health     Tobacco Use: High Risk     Smoking Tobacco Use: Current Every Day Smoker     Smokeless Tobacco Use: Never Used   Alcohol Use: Not on file   Financial Resource Strain: Not on file   Food Insecurity: Not on file   Transportation Needs: Not on file   Physical Activity: Not on file   Stress: Not on file   Social Connections: Not on file   Intimate Partner Violence: Not on file    Depression: At risk     PHQ-2 Score: 3   Housing Stability: Not on file       Functional Status:  Prior to admission patient needed assistance:   Dependent ADLs:: Independent  Dependent IADLs:: Independent  Assesssment of Functional Status: Not at baseline with ADL Functioning,Not at baseline with mobility    Mental Health Status:          Chemical Dependency Status:                Values/Beliefs:  Spiritual, Cultural Beliefs, Mormon Practices, Values that affect care:                 Additional Information:  Assessed in ED. Patient states he lives in a mobile home with his wife and 2 adult sons. He works seasonal with machinery equipment. He has been independent prior to admission.  Will need to follow progress and assess for any needs at discharge.    Plans to transfer to [facility].    KITA REBOLLEDO RN

## 2022-01-28 NOTE — ED TRIAGE NOTES
Here via [facility] ems for syncopy.  Called to house at [facility] for pt whom had syncopy for a few minutes.  Has headache, nausea, dizziness.  Right hand hand numbness and edema.  Pt has 20 g via ems, 8 zofran, ns, 500 bolus upon arrival.  bgl 300 at arrival.  Sought help for stroke like symptoms last week.

## 2022-01-28 NOTE — ED NOTES
Pt oxygen saturations dropped to 87% on RA, placed on 2L NC of O2. Pt saturations now >92%. Provider made aware. ...Kelly Coronado RN

## 2022-01-28 NOTE — CONSULTS
[facility]    Stroke Telephone Note    I was called by Patrick Florez on 01/28/22 regarding patient Jt Venegas. The patient is a 56 year old male with CV risk factors presenting with 2 days of intermittent right-sided weakness and headache/nausea/vomiting. On examination in ED had a mild hand  weakness on the right. Otherwise reportedly has full strength. CT/CTA was done in ED. CT showed subacute infarcts in the left parietal lobe subacute strokes. CTA showed 90% stenoss of the Left ICA distal to bifurcation. Stroke team was notified for further w/u.     Imaging Findings   Results for orders placed or performed during the hospital encounter of 01/28/22   CTA Head Neck with Contrast    Impression     IMPRESSION:   HEAD CT:  1.  High convexity left parietal lobe small area of cortical hypoattenuation concerning for acute infarct. This is near the watershed Junction between the left LOUIE and left MCA territory.  2.  No acute intracranial hemorrhage.  3.  No midline shift.    HEAD CTA:   1.  Left MCA bifurcation aneurysm measuring 4 mm x 3 mm.     2.  Right distal carotid siphon moderate stenosis.    3.  Left distal cavernous ICA mild stenosis. Left distal carotid siphon moderate to severe stenosis.    4.  Otherwise, no significant stenosis/occlusion.    To consult with one of our [facility] Neurointerventional experts or schedule a formal appointment, please contact our clinic, call center, or on-call physician.    [facility] Neurointerventional Clinic: [phone]  [facility] Call Center: [phone]  On-call MD: [phone] (pager)      NECK CTA:  1.  Atherosclerotic plaque results in critical, 90% or greater, stenosis in the left carotid bulb with only a thin wisp of enhancement.     2.  Atherosclerotic plaque results in 50-70% stenosis in the right carotid bulb.     3.  Bilateral carotid bifurcations demonstrate moderate stenoses.    4.  Right vertebral artery origin severe  "stenosis.        DR EVANGELINA GEE was notified by Dr Maulik Chavez at  2:57 AM 01/20/2022.         Impression  Acute ischemic stroke of Left MCA/ MCA- PCA watershed territory infarct due to large-artery atherosclerosis  Carotid Artery Stenosis.   Recommendations   - Use orderset: \"Ischemic Stroke Routine Admission\" or \"Ischemic Stroke No Thrombolytics/No Thrombectomy ICU Admission\"  - Neurochecks and Vital Signs every Q4H   - Permissive HTN; goal SBP < 220 mmHg   - Keep SBP >130s  - Daily aspirin 325 mg for secondary stroke prevention  - Plavix (clopidogrel) 300 mg PO loading dose x 1  - Plavix (clopidogrel) 75 mg PO Daily  - Statin: Lipitor 80mg daily  - MRI Brain with and without contrast  - TTE (with Bubble Study if age 60 yrs or less)  - Telemetry, EKG  - Bedside Glucose Monitoring  - A1c, Lipid Panel, Troponin x 3  - PT/OT/SLP  - Stroke Education  - Smoking Cessation  - Euthermia, Euglycemia   - Transfer patient to [facility] for CEA/PAT evaluation.   - Incidentally found left MCA bifurcation aneurysm- Can be followed up by Endovascular team.      My recommendations are based on the information provided over the phone by Jt Venegas's in-person providers. They are not intended to replace the clinical judgment of his in-person providers. I was not requested to personally see or examine the patient at this time.    The Stroke Staff is Dr. Knox.    Garfield Fountain MD  Vascular Neurology Fellow  To page me or covering stroke neurology team member, click here: AMCOM   Choose \"On Call\" tab at top, then search dropdown box for \"Neurology Adult\", select location, press Enter, then look for stroke/neuro ICU/telestroke.           "

## 2022-01-29 NOTE — ED NOTES
Patient reports that headache has resolved, continues to complain of minor neck pain. Patient states that he no longer feels confused.

## 2022-02-03 DIAGNOSIS — E11.65 TYPE 2 DIABETES MELLITUS WITH HYPERGLYCEMIA, WITHOUT LONG-TERM CURRENT USE OF INSULIN (H): Primary | ICD-10-CM

## 2022-02-07 ENCOUNTER — OFFICE VISIT (OUTPATIENT)
Dept: FAMILY MEDICINE | Facility: CLINIC | Age: 57
End: 2022-02-07
Payer: COMMERCIAL

## 2022-02-07 VITALS
BODY MASS INDEX: 28.9 KG/M2 | HEIGHT: 71 IN | HEART RATE: 95 BPM | WEIGHT: 206.4 LBS | DIASTOLIC BLOOD PRESSURE: 80 MMHG | SYSTOLIC BLOOD PRESSURE: 124 MMHG | TEMPERATURE: 98.6 F | OXYGEN SATURATION: 98 % | RESPIRATION RATE: 16 BRPM

## 2022-02-07 DIAGNOSIS — I63.232 STENOSIS OF LEFT INTERNAL CAROTID ARTERY WITH CEREBRAL INFARCTION (H): ICD-10-CM

## 2022-02-07 DIAGNOSIS — I63.512 ACUTE ISCHEMIC CEREBROVASCULAR ACCIDENT (CVA) INVOLVING LEFT MIDDLE CEREBRAL ARTERY TERRITORY (H): Primary | ICD-10-CM

## 2022-02-07 DIAGNOSIS — I10 ESSENTIAL HYPERTENSION: ICD-10-CM

## 2022-02-07 DIAGNOSIS — E11.59 TYPE 2 DIABETES MELLITUS WITH OTHER CIRCULATORY COMPLICATION, WITH LONG-TERM CURRENT USE OF INSULIN (H): ICD-10-CM

## 2022-02-07 DIAGNOSIS — Z79.4 TYPE 2 DIABETES MELLITUS WITH OTHER CIRCULATORY COMPLICATION, WITH LONG-TERM CURRENT USE OF INSULIN (H): ICD-10-CM

## 2022-02-07 DIAGNOSIS — F17.200 TOBACCO DEPENDENCE SYNDROME: ICD-10-CM

## 2022-02-07 LAB
ANION GAP SERPL CALCULATED.3IONS-SCNC: 14 MMOL/L (ref 5–18)
BUN SERPL-MCNC: 14 MG/DL (ref 8–22)
CALCIUM SERPL-MCNC: 9.9 MG/DL (ref 8.5–10.5)
CHLORIDE BLD-SCNC: 104 MMOL/L (ref 98–107)
CO2 SERPL-SCNC: 22 MMOL/L (ref 22–31)
CREAT SERPL-MCNC: 1.22 MG/DL (ref 0.7–1.3)
GFR SERPL CREATININE-BSD FRML MDRD: 69 ML/MIN/1.73M2
GLUCOSE BLD-MCNC: 111 MG/DL (ref 70–125)
HOLD SPECIMEN: NORMAL
HOLD SPECIMEN: NORMAL
POTASSIUM BLD-SCNC: 4.5 MMOL/L (ref 3.5–5)
SODIUM SERPL-SCNC: 140 MMOL/L (ref 136–145)

## 2022-02-07 PROCEDURE — U0005 INFEC AGEN DETEC AMPLI PROBE: HCPCS | Performed by: FAMILY MEDICINE

## 2022-02-07 PROCEDURE — U0003 INFECTIOUS AGENT DETECTION BY NUCLEIC ACID (DNA OR RNA); SEVERE ACUTE RESPIRATORY SYNDROME CORONAVIRUS 2 (SARS-COV-2) (CORONAVIRUS DISEASE [COVID-19]), AMPLIFIED PROBE TECHNIQUE, MAKING USE OF HIGH THROUGHPUT TECHNOLOGIES AS DESCRIBED BY CMS-2020-01-R: HCPCS | Performed by: FAMILY MEDICINE

## 2022-02-07 PROCEDURE — 80048 BASIC METABOLIC PNL TOTAL CA: CPT | Performed by: FAMILY MEDICINE

## 2022-02-07 PROCEDURE — 99496 TRANSJ CARE MGMT HIGH F2F 7D: CPT | Performed by: FAMILY MEDICINE

## 2022-02-07 PROCEDURE — 36415 COLL VENOUS BLD VENIPUNCTURE: CPT | Performed by: FAMILY MEDICINE

## 2022-02-07 RX ORDER — INSULIN LISPRO 100 [IU]/ML
4 INJECTION, SOLUTION INTRAVENOUS; SUBCUTANEOUS
COMMUNITY
End: 2022-03-23

## 2022-02-07 RX ORDER — LISINOPRIL 10 MG/1
10 TABLET ORAL DAILY
Qty: 90 TABLET | Refills: 2
Start: 2022-02-07 | End: 2022-05-11

## 2022-02-07 RX ORDER — CLOPIDOGREL BISULFATE 75 MG/1
75 TABLET ORAL
COMMUNITY
Start: 2022-02-03 | End: 2022-03-04

## 2022-02-07 ASSESSMENT — MIFFLIN-ST. JEOR: SCORE: 1775.41

## 2022-02-07 NOTE — PROGRESS NOTES
Assessment/Plan:     Acute ischemic cerebrovascular accident (CVA) involving left middle cerebral artery territory (H)  Stenosis of left internal carotid artery with cerebral infarction (H)  Currently taking aspirin, clopidogrel, and Eliquis.  He has follow-up CT imaging tomorrow and pending that result is anticipating left carotid endarterectomy on 2/11/2022.  He will be given instructions in regards to Eliquis pending those results.  Will remain on clopidogrel and aspirin.  To continue his usual medications prior to that visit.  Covid test is obtained today in anticipation of surgery.  Advised tobacco cessation, for now would avoid Wellbutrin in light of anticipated procedure and recent stroke both of which increases short-term risk of seizure which could be worsened with Wellbutrin.  Could consider following healing process.  I am comfortable with him proceeding with surgery without further clinical clarification or evaluation.  Will await basic metabolic panel.  - Basic metabolic panel; Future  - Asymptomatic COVID-19 Virus (Coronavirus) by PCR Nose  - Basic metabolic panel; Future  - Basic metabolic panel    Type 2 diabetes mellitus with other circulatory complication, with long-term current use of insulin (H)  He has appointment scheduled for diabetes education.  We will continue Lantus insulin 30 units, lispro insulin with meals with a sliding scale.  - Basic metabolic panel; Future  - Basic metabolic panel    Tobacco dependence syndrome  Advised cessation.  Avoid Wellbutrin for now.  Advised that he clear this with vascular surgery prior to starting Wellbutrin in the short-term.  May use to nicotine replacement.    Essential hypertension  Adequately controlled on current regimen.  - lisinopril (ZESTRIL) 10 MG tablet; Take 1 tablet (10 mg) by mouth daily  - Basic metabolic panel; Future  - Basic metabolic panel      There are no Patient Instructions on file for this visit.     No follow-ups on file.--He  will follow-up me following surgical procedure.      Subjective:      Jt Venegas is a 56 year old male presented to clinic today for hospital follow-up.  He was hospitalized at [facility] from 1/27/2022 through 2/3/2022.  I had seen him in clinic the week prior to admission with new right upper extremity neurologic changes concerning for cerebrovascular disease.  Imaging was ordered but had not yet been performed.  He had in the interim developed vomiting and began passing out at home, presented to [facility] emergency room and then ultimately was transitioned to [facility] where a bed was available.  Noted to have multiple acute to subacute small left MCA distribution infarctions along with severe left carotid artery stenosis.  These areas of ischemia were in the parietal and frontal regions.  He was noted to have mild right carotid stenosis and mild right vertebral artery origin stenosis.  Echocardiogram showed low normal global systolic function of the left ventricle with ejection fraction 50% but no other concerning changes found.  Patient was informed that he has a blood clot in his left carotid artery, unfortunately I do not see records of this via care everywhere.  He was started on Eliquis, clopidogrel, and aspirin, despite reported aspirin allergy of hives has been tolerating well.  He was started on Lantus insulin and mealtime lispro insulin with sliding scale, his naproxen, Actos, and glipizide were discontinued.  He remains on Metformin.  Since being home he has felt well.  Continued mild clumsiness of right upper extremity is improving.  No new focal symptoms.  He continues to smoke 3 cigarettes/day, was prescribed Nicorette gum but has not been using.  He has a follow-up CT scan scheduled for tomorrow and pending those results is anticipating a left carotid endarterectomy on 2/11/2022.  He is needing a preoperative Covid test.    He denies current illness  including Covid symptoms.      Hospital Follow-up Visit:    Hospital/Nursing Home/IP Rehab Facility: [facility]   Date of Admission: 1-27-22  Date of Discharge: 2-3-22  Reason(s) for Admission: Falling, severe head/neck pain      Was your hospitalization related to COVID-19? No   Problems taking medications regularly:  None  Medication changes since discharge: yes, chart reviewed  Problems adhering to non-medication therapy:  None    Summary of hospitalization:  CareEverywhere information obtained and reviewed  Diagnostic Tests/Treatments reviewed.  Follow up needed: He has imaging scheduled tomorrow for CT scan to determine his ability to proceed with left carotid endarterectomy on 2/11/2022.  He is scheduled to meet with a provider from physical medicine and rehab, scheduled for occupational and physical therapies.  Other Healthcare Providers Involved in Patient s Care:         Vascular surgery at [facility]  Update since discharge: stable.       Post Discharge Medication Reconciliation: discharge medications reconciled, continue medications without change.  Plan of care communicated with patient and spouse                  Current Outpatient Medications   Medication Sig Dispense Refill     apixaban ANTICOAGULANT (ELIQUIS) 5 MG tablet Take 5 mg by mouth       aspirin 81 MG EC tablet [ASPIRIN 81 MG EC TABLET] Take 81 mg by mouth daily.       atorvastatin (LIPITOR) 80 MG tablet [ATORVASTATIN (LIPITOR) 80 MG TABLET] Take 1 tablet (80 mg total) by mouth at bedtime. 90 tablet 1     blood-glucose meter Misc [BLOOD-GLUCOSE METER MISC] Use 1 Device As Directed 2 (two) times a day. 1 each 0     clopidogrel (PLAVIX) 75 MG tablet Take 75 mg by mouth       insulin glargine (LANTUS PEN) 100 UNIT/ML pen Inject 30 Units Subcutaneous every morning (before breakfast)       insulin lispro (HUMALOG KWIKPEN) 100 UNIT/ML (1 unit dial) KWIKPEN Inject 4 Units Subcutaneous 3 times daily (before meals) Adjust per insulin sliding  scale based on blood sugar       lisinopril (ZESTRIL) 10 MG tablet Take 1 tablet (10 mg) by mouth daily 90 tablet 2     metFORMIN (GLUCOPHAGE-XR) 500 MG 24 hr tablet Take 4 tablets (2,000 mg) by mouth daily (with dinner) (Patient taking differently: Take 1,000 mg by mouth 2 times daily (with meals) ) 360 tablet 1     sildenafiL (VIAGRA) 50 MG tablet [SILDENAFIL (VIAGRA) 50 MG TABLET] Take 1 tablet (50 mg total) by mouth as needed for erectile dysfunction. 20 tablet 11     TRUETRACK TEST strips [TRUETRACK TEST STRIPS] TEST BLOOD SUGAR TWICE DAILY OR AS DIRECTED. 200 strip 0       Past Medical History, Family History, and Social History reviewed.  No past medical history on file.  Past Surgical History:   Procedure Laterality Date     APPENDECTOMY       HERNIA REPAIR       Hydrochlorothiazide  Family History   Problem Relation Age of Onset     Diabetes Mother      Cancer Mother         lung and brain     Cancer Father         melanoma     Social History     Socioeconomic History     Marital status:      Spouse name: Not on file     Number of children: Not on file     Years of education: Not on file     Highest education level: Not on file   Occupational History     Not on file   Tobacco Use     Smoking status: Current Every Day Smoker     Packs/day: 0.10     Smokeless tobacco: Never Used   Substance and Sexual Activity     Alcohol use: Not on file     Comment: one drink every 2 weeks     Drug use: Never     Sexual activity: Not on file   Other Topics Concern     Not on file   Social History Narrative     Not on file     Social Determinants of Health     Financial Resource Strain: Not on file   Food Insecurity: Not on file   Transportation Needs: Not on file   Physical Activity: Not on file   Stress: Not on file   Social Connections: Not on file   Intimate Partner Violence: Not on file   Housing Stability: Not on file         Review of systems is as stated in HPI, and the remainder of the 10 system review is  "otherwise negative.    Objective:     Vitals:    02/07/22 1146   BP: 124/80   Pulse: 95   Resp: 16   Temp: 98.6  F (37  C)   TempSrc: Tympanic   SpO2: 98%   Weight: 93.6 kg (206 lb 6.4 oz)   Height: 1.791 m (5' 10.5\")    Body mass index is 29.2 kg/m .    Alert male.  Pupils are equal, round, and reactive to light.  Face moves symmetrically.  Neck supple without lymphadenopathy.  Heart with regular rate and rhythm.  Lungs clear.  Extremities without edema.  5 or 5 strength throughout upper extremities.      This note has been dictated using voice recognition software. Any grammatical or context distortions are unintentional and inherent to the the software.     "

## 2022-02-08 LAB — SARS-COV-2 RNA RESP QL NAA+PROBE: NEGATIVE

## 2022-02-21 ENCOUNTER — OFFICE VISIT (OUTPATIENT)
Dept: FAMILY MEDICINE | Facility: CLINIC | Age: 57
End: 2022-02-21
Payer: COMMERCIAL

## 2022-02-21 VITALS
OXYGEN SATURATION: 98 % | BODY MASS INDEX: 30.13 KG/M2 | DIASTOLIC BLOOD PRESSURE: 88 MMHG | SYSTOLIC BLOOD PRESSURE: 130 MMHG | HEART RATE: 113 BPM | WEIGHT: 213 LBS

## 2022-02-21 DIAGNOSIS — Z98.890 H/O ENDARTERECTOMY: ICD-10-CM

## 2022-02-21 DIAGNOSIS — F17.200 TOBACCO DEPENDENCE SYNDROME: ICD-10-CM

## 2022-02-21 DIAGNOSIS — L98.9 SKIN LESION: ICD-10-CM

## 2022-02-21 DIAGNOSIS — I63.512 ACUTE ISCHEMIC CEREBROVASCULAR ACCIDENT (CVA) INVOLVING LEFT MIDDLE CEREBRAL ARTERY TERRITORY (H): Primary | ICD-10-CM

## 2022-02-21 PROCEDURE — 99214 OFFICE O/P EST MOD 30 MIN: CPT | Performed by: FAMILY MEDICINE

## 2022-02-21 RX ORDER — OXYCODONE HYDROCHLORIDE 5 MG/1
5 TABLET ORAL
COMMUNITY
Start: 2022-02-17 | End: 2022-04-18

## 2022-02-21 ASSESSMENT — PATIENT HEALTH QUESTIONNAIRE - PHQ9
SUM OF ALL RESPONSES TO PHQ QUESTIONS 1-9: 1
SUM OF ALL RESPONSES TO PHQ QUESTIONS 1-9: 1
10. IF YOU CHECKED OFF ANY PROBLEMS, HOW DIFFICULT HAVE THESE PROBLEMS MADE IT FOR YOU TO DO YOUR WORK, TAKE CARE OF THINGS AT HOME, OR GET ALONG WITH OTHER PEOPLE: NOT DIFFICULT AT ALL

## 2022-02-21 ASSESSMENT — ANXIETY QUESTIONNAIRES
1. FEELING NERVOUS, ANXIOUS, OR ON EDGE: NOT AT ALL
3. WORRYING TOO MUCH ABOUT DIFFERENT THINGS: NOT AT ALL
5. BEING SO RESTLESS THAT IT IS HARD TO SIT STILL: NOT AT ALL
4. TROUBLE RELAXING: NOT AT ALL
GAD7 TOTAL SCORE: 0
6. BECOMING EASILY ANNOYED OR IRRITABLE: NOT AT ALL
GAD7 TOTAL SCORE: 0
7. FEELING AFRAID AS IF SOMETHING AWFUL MIGHT HAPPEN: NOT AT ALL
GAD7 TOTAL SCORE: 0
2. NOT BEING ABLE TO STOP OR CONTROL WORRYING: NOT AT ALL
7. FEELING AFRAID AS IF SOMETHING AWFUL MIGHT HAPPEN: NOT AT ALL

## 2022-02-21 NOTE — PROGRESS NOTES
"  Assessment & Plan     Acute ischemic cerebrovascular accident (CVA) involving left middle cerebral artery territory (H)  H/O endarterectomy  Patient with a history of CVA in January 2022 following up today s/p carotid enterectomy. Patient doing well since discharge, using prn oxycodone for incisional pain. No new neuro symptoms, residual right sided weakness. Follow up appointment with vascular surgery in March.  Referral for in-network PT, neuro, and vascular surgery placed per pt preference.     - Physical Therapy Referral; Future  - Vascular Surgery Referral; Future  - Adult Neurology  Referral; Future    Skin lesion  Patient notes that he had a \"zit\" that he picked 2 months ago, since then he has had shallow non-healing pink crater-like lesion on tip of nose. Agreeable to seeing dermatology. Patient's wife notes she has a history of MRSA.     - Adult Dermatology Referral; Future         Tobacco Cessation:   reports that he has been smoking. He has been smoking about 0.10 packs per day. He has never used smokeless tobacco.    BMI:   Estimated body mass index is 30.13 kg/m  as calculated from the following:    Height as of 2/7/22: 1.791 m (5' 10.5\").    Weight as of this encounter: 96.6 kg (213 lb).     No follow-ups on file.    Nubia Stahl MD  [facility]      Carolann Waters is a 56 year old who presents for the following health issues      HPI     Patient s/p carotid endarterectomy, discharged 2/12. Patient states he has been doing well, says he has been taking as needed oxycodone for incisional pain. Denies new neuro symptoms, residual right sided weakness from CVA. Wondering about easy bruising with insulin injections on his abdomen. Also inquiring about in-network follow up with vascular surgery, neurology, and physical therapy.    Hospital Follow-up Visit:    Hospital/Nursing Home/IP Rehab Facility: [facility]  Date of Admission: 2/11/22  Date of Discharge: " 2/12/22  Reason(s) for Admission: endarterectomy       Was your hospitalization related to COVID-19? No   Problems taking medications regularly:  None  Medication changes since discharge: None  Problems adhering to non-medication therapy:  None    Summary of hospitalization:  [facility] discharge summary reviewed  Diagnostic Tests/Treatments reviewed.  Follow up needed: following with vascular and PT  Other Healthcare Providers Involved in Patient s Care:         vascular, PT, and now neurology involved  Update since discharge: improved. Post Discharge Medication Reconciliation: discharge medications reconciled, continue medications without change.  Plan of care communicated with patient            Review of Systems   12 point ROS negative except weight changes, weakness, joint pain, arthritis, easy bruising       Objective    /88   Pulse 113   Wt 96.6 kg (213 lb)   SpO2 98%   BMI 30.13 kg/m    Body mass index is 30.13 kg/m .  Physical Exam   GENERAL: healthy, alert and no distress  NECK: no adenopathy, asymmetry or masses. 8-10 cm healing incision on left neck, well approximated without drainage.  RESP: lungs clear to auscultation - no rales, rhonchi or wheezes  CV: tachycardic with regular rhythm, normal S1 S2, no murmur, click or rub, no peripheral edema   SKIN: Skin dry, intact except 1 cm dry crater-like lesion on tip of nose, abdominal bruising around insulin injection sites.   NEURO: Residual right sided weakness, mentation intact and speech normal  PSYCH: mentation appears normal, affect normal/bright          Answers for HPI/ROS submitted by the patient on 2/21/2022  If you checked off any problems, how difficult have these problems made it for you to do your work, take care of things at home, or get along with other people?: Not difficult at all  PHQ9 TOTAL SCORE: 1  JUAN CARLOS 7 TOTAL SCORE: 0

## 2022-02-22 ASSESSMENT — PATIENT HEALTH QUESTIONNAIRE - PHQ9: SUM OF ALL RESPONSES TO PHQ QUESTIONS 1-9: 1

## 2022-02-22 ASSESSMENT — ANXIETY QUESTIONNAIRES: GAD7 TOTAL SCORE: 0

## 2022-02-24 ENCOUNTER — MYC MEDICAL ADVICE (OUTPATIENT)
Dept: FAMILY MEDICINE | Facility: CLINIC | Age: 57
End: 2022-02-24
Payer: COMMERCIAL

## 2022-02-28 ENCOUNTER — HOSPITAL ENCOUNTER (OUTPATIENT)
Dept: PHYSICAL THERAPY | Facility: REHABILITATION | Age: 57
End: 2022-02-28
Attending: FAMILY MEDICINE
Payer: COMMERCIAL

## 2022-02-28 DIAGNOSIS — I63.512 ACUTE ISCHEMIC CEREBROVASCULAR ACCIDENT (CVA) INVOLVING LEFT MIDDLE CEREBRAL ARTERY TERRITORY (H): ICD-10-CM

## 2022-02-28 DIAGNOSIS — M62.81 MUSCLE WEAKNESS (GENERALIZED): Primary | ICD-10-CM

## 2022-02-28 DIAGNOSIS — R53.81 PHYSICAL DECONDITIONING: ICD-10-CM

## 2022-02-28 PROCEDURE — 97161 PT EVAL LOW COMPLEX 20 MIN: CPT | Mod: GP | Performed by: PHYSICAL THERAPIST

## 2022-02-28 PROCEDURE — 97112 NEUROMUSCULAR REEDUCATION: CPT | Mod: GP | Performed by: PHYSICAL THERAPIST

## 2022-02-28 PROCEDURE — 97110 THERAPEUTIC EXERCISES: CPT | Mod: GP | Performed by: PHYSICAL THERAPIST

## 2022-02-28 NOTE — PROGRESS NOTES
02/28/22 1100   Quick Adds   Type of Visit Initial OP PT Evaluation   General Information   Start of Care Date 02/28/22   Referring Physician Nubia Stahl   Orders Evaluate and Treat as Indicated   Order Date 02/21/22   Medical Diagnosis Acute ischemic cerebrovascular accident (CVA) involving left middle cerebral artery territory (H)   Onset of illness/injury or Date of Surgery 01/27/22   Precautions/Limitations no known precautions/limitations   Surgical/Medical history reviewed Yes   Pertinent history of current problem (include personal factors and/or comorbidities that impact the POC) Pt reports he had some sx in his R arm for maybe a week - he would try to reach for something and the arm wouldn't go where he thought it should go. Then about a week after that pt went to work and passed out and ended up going by ambulance to hospital. Pt's stroke was on 27th of January. 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 Pt ended up at [facility] for 5 days. Surgery for carotid was 2/11/22. Pt reports he is also diabetic and his sugars were around 500 when he initially had his stroke. Pt is going back to an endocrinologist and will meet with a diabetic educator soon to check on sugar levels and modifications as needed. Since pt has been home he fell when he was trying to pull up his trash can over a snow bank and hurt his left hip and hit his head but didn't black out or notice any head injury afterwards. Pt  feels like hip pain is getting better. Pt hasn't noticed a bruise and could stand and walk on it right away but got sore. Since stroke pt is still struggling with pain and tingling into his R hand in 4th and 5th fingers and he feels like he has fatigue and ache all over now. Pt reports his right arm can still sometimes in the morning feel off with the coordination but better  than it was before the stroke. Pt is now on a medical leave from work until the 23rd when he meets back with the MD. Pt feels achy all over - feels related to after he gives himself shots for his diabetes. Pt feels like it is hard to get up off the couch too.    Pertinent Visual History  Pt was having blurry vision prior to stroke - started to wear cheaters and those help. 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    Patient role/Employment history Employed   Living environment House/[facility]   Assistive Devices Comments Pt reports therapy in hospital wanted him to use a walker or cane but pt didn't.   Patient/Family Goals Statement pt wants his strength back in legs and arms and have body aches go away   General Information Comments Pt reports h/o L shoulder pain x10 years with degenerative changes - may eventually do a joint replacement. Pt also has neuropathy in both feet related to his diabetes - tingling constant in feet. Pt also    Fall Risk Screen   Fall screen completed by PT   Have you fallen 2 or more times in the past year? No   Have you fallen and had an injury in the past year? No   Is patient a fall risk? No   Abuse Screen (yes response referral indicated)   Feels Unsafe at Home or Work/School no   Feels Threatened by Someone no   Does Anyone Try to Keep You From Having Contact with Others or Doing Things Outside Your Home? no   Physical Signs of Abuse Present no   Pain   Patient currently in pain Yes   Pain location all over   Pain rating 0-6/10   Pain description comment hurts more after giving himself a shot for diabetes meds   Cognitive Status Examination   Orientation orientation to person, place and time   Range of Motion (ROM)   ROM Comment Trunk flex mod limited with fingertips to distal shin with tightness, otherwise WNL trunk ROM, shoulder ROM WFL B UE but painful left shoulder flex and abd   Strength   Manual Muscle Testing Quick  Adds MMT: Neck;MMT: Trunk   MMT: Neck, Rehab Eval   Neck Muscle Testing Results R shoulder flexors and abductors 3+/5, L shoulder flexors 4+/5 with pain from shoulder injury, otherwise UE grossly 5/5   MMT: Trunk, Rehab Eval   Trunk Muscle Testing Results B hip flexors 4/5 with weakness, R plantar flexors 4/5 - difficulty with toe walking versus L side stable   Transfer Skills   Transfer Comments Sit to stand with UE assist usually   Gait   Gait Comments Amb with WBOS and genu varus B LE    Balance   Balance Comments APTA sit to stand 11 reps in 30 seconds (age/gender norms 15 reps)   Balance Special Tests   Balance Special Tests Single leg stance right;Single leg stance left   Balance Special Tests Single Leg Stance Right,   Right, seconds 4 Seconds   Balance Special Tests Single Leg Stance Left   Left, seconds 5 Seconds   Coordination   Coordination Comments Pt reports sometimes still abnormal finger to tip of nose accuracy - more in morning   Planned Therapy Interventions   Planned Therapy Interventions ADL retraining;balance training;bed mobility training;gait training;fine motor coordination training;joint mobilization;neuromuscular re-education;ROM;strengthening;stretching;transfer training;manual therapy   Clinical Impression   Criteria for Skilled Therapeutic Interventions Met yes, treatment indicated   PT Diagnosis CVA with R sided weakness with physical deconditioning and abnormal balance   Clinical Presentation Stable/Uncomplicated   Clinical Decision Making (Complexity) Low complexity   Therapy Frequency 1 time/week   Predicted Duration of Therapy Intervention (days/wks) 12 weeks   Risk & Benefits of therapy have been explained Yes   Patient, Family & other staff in agreement with plan of care Yes   Clinical Impression Comments Pt demo's signs and sx consistent with CVA with hospitalization resulting in R > L sided weakness with general physical deconditioning, decreased gait quality and at a balance  risk. These impairments cause difficulty with functional mobility and pt is off of work at this time due to his sx and healing. Pt is great candidate for skilled PT services to improve impairments and reach goals.   GOALS   PT Eval Goals 1;2;3   Goal 1   Goal Description Pt will be able to do 15 sit to stands in 30 seconds to decrease risk of falls and demo improved physical conditioning in 12 weeks.   Target Date 05/23/22   Goal 2   Goal Description Pt will be able to demo normal strength R UE and LE with MMT for improved ability to performing lifting tasks at workin 12 weeks.   Target Date 05/23/22   Goal 3   Goal Description Pt will be able to balance x10 seconds B SLS for decreased risk for falls in 12 weeks.    Target Date 05/23/22   Total Evaluation Time   PT Bob Low Complexity Minutes (80632) 30

## 2022-02-28 NOTE — DISCHARGE INSTRUCTIONS
Exercises      x10-20 reps in a row 1-2x/day      Single leg balance - try to do for 30 seconds without touching x5-10 reps     Shoulder exercises    Raise arms forward and over head x10-20 reps 1-2x/day      Raise arms out to the side and over head x10-20 reps 1-2x/day    Try to add 1 lb or soup can when 20 reps are easy

## 2022-03-01 ENCOUNTER — ALLIED HEALTH/NURSE VISIT (OUTPATIENT)
Dept: EDUCATION SERVICES | Facility: CLINIC | Age: 57
End: 2022-03-01
Payer: COMMERCIAL

## 2022-03-01 VITALS — BODY MASS INDEX: 29.8 KG/M2 | WEIGHT: 210.7 LBS

## 2022-03-01 DIAGNOSIS — E11.65 TYPE 2 DIABETES MELLITUS WITH HYPERGLYCEMIA, WITHOUT LONG-TERM CURRENT USE OF INSULIN (H): ICD-10-CM

## 2022-03-01 PROCEDURE — G0108 DIAB MANAGE TRN  PER INDIV: HCPCS | Mod: AE

## 2022-03-01 NOTE — CONFIDENTIAL NOTE
About 10 years  Just started insulin about 2 months ago  Was taking Metformin and Glipizide  Knew had stroke  Blockage and blood clot  Has gum and home now to help with smoking    This morning 205  Higher in afternoon  300's  High-385 after hernesto  Lowest-178    Morning-Lantus  Humalog 3 times daily most days  If not eating doesn't take    B-peanut butter toast on whole wheat  Coffee-cream sugar free Danish vanilla  2 cups  Eggs and hashbrowns  Smaller portions  L-beirstube yesterday porketta sandwich and chips  Leftovers  Sandwiches  Ramen  D-dinner last night was hernesto  1 pound hernesto  Son has DM also  Steak  Chicken tacos  Chicken salad  S-candy  Brownies  Applesauce   Pudding  Nuts  Soda-diet  Vegetables-likes most  Likes to grill  Fruit-loves canned  What's in season  Fish-yes  Nuts-yes    Started PT yesterday  Wait on adding weights    Lunch is mostly 8-10 depends  Does not check before lunch  educucated guess    HumaLOG KwikPen Insulin 100 unit/mL Inpn pen  For diagnoses: Type 2 diabetes mellitus with other circulatory complication, with long-term current use of insulin (HC)  Generic drug: insulin lispro (U-100)  Inject 8 units with meals, 4 with snacks + <150 no additional insulin, 150-199: 2 unit, 200-249: 4 units, 250-299: 6 units, 300-349: 8 units, 350-399: 10 units, >400: 12 units and call MD. Up to 45 units daily.  Doctor's comments: Product desired: HUMALOG KWIKPEN vs ADMELOG SOLOSTAR or NOVOLOG.

## 2022-03-01 NOTE — LETTER
"    3/1/2022         RE: Jt Venegas  [address] [facility] [address]        Dear Colleague,    Thank you for referring your patient, Jt Venegas, to the [facility]. Please see a copy of my visit note below.    Diabetes Self-Management Education & Support    Presents for:      SUBJECTIVE/OBJECTIVE:     Cultural Influences/Ethnic Background:  Not  or       Diabetes Symptoms & Complications:          Patient Problem List and Family Medical History reviewed for relevant medical history, current medical status, and diabetes risk factors.    Vitals:  Wt 95.6 kg (210 lb 11.2 oz)   BMI 29.80 kg/m    Estimated body mass index is 29.8 kg/m  as calculated from the following:    Height as of 2/7/22: 1.791 m (5' 10.5\").    Weight as of this encounter: 95.6 kg (210 lb 11.2 oz).   Last 3 BP:   BP Readings from Last 3 Encounters:   02/21/22 130/88   02/07/22 124/80   01/28/22 139/69       History   Smoking Status     Current Every Day Smoker     Packs/day: 0.50   Smokeless Tobacco     Never Used       Labs:  Lab Results   Component Value Date    A1C 14.7 01/24/2022     Lab Results   Component Value Date     02/07/2022     Lab Results   Component Value Date     04/04/2019     Direct Measure HDL   Date Value Ref Range Status   06/13/2016 37 (L) >=40 mg/dL Final   ]  GFR Estimate   Date Value Ref Range Status   02/07/2022 69 >60 mL/min/1.73m2 Final     Comment:     Effective December 21, 2021 eGFRcr in adults is calculated using the 2021 CKD-EPI creatinine equation which includes age and gender (Zulay et al., NEJM, DOI: 10.1056/NBFNbo8766021)   11/09/2020 51 (L) >60 mL/min/1.73m2 Final     GFR Estimate If Black   Date Value Ref Range Status   11/09/2020 >60 >60 mL/min/1.73m2 Final     Lab Results   Component Value Date    CR 1.22 02/07/2022     No results found for: MICROALBUMIN    Healthy Eating:       Being Active:       Monitoring:       Taking " Medications:  Diabetes Medication(s)     Biguanides       metFORMIN (GLUCOPHAGE-XR) 500 MG 24 hr tablet    Take 4 tablets (2,000 mg) by mouth daily (with dinner)     Patient taking differently: Take 1,000 mg by mouth 2 times daily (with meals)     Insulin       insulin glargine (LANTUS PEN) 100 UNIT/ML pen    Inject 30 Units Subcutaneous every morning (before breakfast)     insulin lispro (HUMALOG KWIKPEN) 100 UNIT/ML (1 unit dial) KWIKPEN    Inject 4 Units Subcutaneous 3 times daily (before meals) Adjust per insulin sliding scale based on blood sugar               Problem Solving:                 Reducing Risks:       Healthy Coping:     Patient Activation Measure Survey Score:  No flowsheet data found.    Diabetes knowledge and skills assessment:   Patient is knowledgeable in diabetes management concepts related to: Being Active, Monitoring and Taking Medication    Patient needs further education on the following diabetes management concepts: Healthy Eating, Monitoring, Taking Medication and Healthy Coping    Based on learning assessment above, most appropriate setting for further diabetes education would be: Individual setting.      INTERVENTIONS:    Education provided today on:  AADE Self-Care Behaviors:  Diabetes Pathophysiology  Healthy Eating: carbohydrate counting, consistency in amount, composition, and timing of food intake, heart healthy diet, eating out and portion control  Being Active: relationship to blood glucose, describe appropriate activity program and precautions to take  Monitoring: log and interpret results, individual blood glucose targets and frequency of monitoring  Taking Medication: action of prescribed medication, drawing up, administering and storing injectable diabetes medications, proper site selection and rotation for injections, side effects of prescribed medications and when to take medications  Problem Solving: high blood glucose - causes, signs/symptoms, treatment and prevention,  "low blood glucose - causes, signs/symptoms, treatment and prevention and carrying a carbohydrate source at all times  Healthy Coping: recognize feelings about diagnosis, benefits of making appropriate lifestyle changes and methods for coping with stress    Opportunities for ongoing education and support in diabetes-self management were discussed.    Pt verbalized understanding of concepts discussed and recommendations provided today.       Education Materials Provided:   [employer] Healthy Living with Diabetes Book and Carbohydrate Counting      ASSESSMENT:  Jt is here with his wife, Kassie, today for his diabetes education.  Stated he has had diabetes for about 10 years.  He was started on insulin 2 months ago previously he was taking Metformin and glipizide.  In February he suffered a stroke.  Stated he was told he had blockage and a blood clot.  This is resolved and he is doing well, his wife is very concerned about his overall health.    Jt is currently taking Lantus 30 units 1 time daily in the morning.  He is also taking Humalog 4 units plus sliding scale, of which he is not sure of today.  Said he does have this on a piece of paper at home.  He is taking his Humalog 3 times a day most days unless he is skipping a meal.  He is also taking Metformin extended release 2000 mg in the morning.  Stated he is taking 8 to 10 units before most meals.  When he is not checking his blood sugar before eating he uses a \"educated guess\" as to how much insulin he should take.    He is checking his blood sugars 1-3 times daily.  Stated this morning his fasting blood sugar was 205, tends to run higher in the afternoon, in the 300s.  Stated his highest reading he has had since hospitalization was 385 after having a burger.  His lowest reading was 178.  He is interested in getting a freestyle continuous glucose monitor so he does not have to poke his fingers.  Given that he is on insulin 4 times daily, will send a " prescription to his local pharmacy.    Jt and his wife have already started making changes to his meal plan.  He is currently not working and is eating most of his meals at home.  We reviewed his current meal plan, which is noted below:  B- peanut butter toast on whole-wheat  Coffee-sugar-free Hungarian vanilla creamer  2 cups  Eggs and hash [place]  Smaller portions  L-yesterday went out for lunch and had a pork at a sandwich and chips  Leftovers  Sandwiches  [place]  D- dinner last night was a burger, a 1 pound burger  His son who also has diabetes lives at home  Steak  Chicken tacos  Chicken salad  S-candy  Brownies  Applesauce  Pudding  Nuts  Soda-diet  Vegetables-works most  Likes to grill  Fruit-loves canned  What is in season  Fish-yes  Nuts-yes  Reviewed carb counting, carb ID, the amount of carb to eat in a sitting as well as how often to eat throughout the day.  Discussed the role that protein and fiber play in glucose control.  Reviewed inexpensive healthier options for snacks as well as ways to add vegetables into his current meal plan.    Jt started physical therapy yesterday.  Stated he was instructed to wait in regards to adding weights to his activity.  Activity is currently restricted.  Advised to do what he is able and comfortable as this will also help with his blood sugar control.    Plan:  Increase Jt's Lantus dose to 36 units daily.  Reviewed Humalog instructions that were given at the time of discharge which include using 8 units with meals and 4 units with snacks plus a sliding scale of 2 units for every 50 points above 150.  This was written out for him today.  Asked Jt to continue working on his meal planning.  Asked to follow-up with me in 4 to 6 weeks, no appointment is needed at this time.    Much or all of the text in this note was generated through the use of the Dragon Dictate voice-to-text software. Errors in spelling or words which seem out of context are  unintentional. Sound alike errors, in particular, may have escaped editing.        Patient's most recent   Lab Results   Component Value Date    A1C 14.7 01/24/2022    is not meeting goal of <8.0    PLAN  See Patient Instructions for co-developed, patient-stated behavior change goals.  AVS printed and provided to patient today. See Follow-Up section for recommended follow-up.     The service provided today was under the supervising provider, Greg Moncada, who was available if needed.     Time Spent: 60 minutes  Encounter Type: Individual    Any diabetes medication dose changes were made via the CDE Protocol and Collaborative Practice Agreement with the patient's referring provider. A copy of this encounter was shared with the provider.

## 2022-03-03 ENCOUNTER — MYC MEDICAL ADVICE (OUTPATIENT)
Dept: FAMILY MEDICINE | Facility: CLINIC | Age: 57
End: 2022-03-03
Payer: COMMERCIAL

## 2022-03-03 DIAGNOSIS — I63.512 ACUTE ISCHEMIC CEREBROVASCULAR ACCIDENT (CVA) INVOLVING LEFT MIDDLE CEREBRAL ARTERY TERRITORY (H): Primary | ICD-10-CM

## 2022-03-04 ENCOUNTER — MYC MEDICAL ADVICE (OUTPATIENT)
Dept: FAMILY MEDICINE | Facility: CLINIC | Age: 57
End: 2022-03-04
Payer: COMMERCIAL

## 2022-03-04 RX ORDER — CLOPIDOGREL BISULFATE 75 MG/1
75 TABLET ORAL DAILY
Qty: 90 TABLET | Refills: 4 | Status: SHIPPED | OUTPATIENT
Start: 2022-03-04 | End: 2023-05-02

## 2022-03-07 NOTE — TELEPHONE ENCOUNTER
"Jt sent a [employer] message:  So we have been trying to find the lady that we seen on March 1st the educator? So she [facility] it was a freestyle limbre 2 so we picked it up today and got home and my phone doesn t work with the armando on my phone. So now I called [facility] and we have to wait to talked to manager on Monday to see if we can even get the money bavk! It was $75.00 out of our pockets and we can t use it and so I am trying to send a message to her and I can t find her anywhere in my care team? So if someone could have her message us back raudel we are laid off right now and $75.00 is a lot to just not be used? So please let us know please.     CDE assess/reply:  Izabela last saw Kelly Cintron for DB education.   ~ A Freestyle Libre2 was ordered for him    I'm not sure from his message whether he is using the wrong (whitney 14 day) armando vs phone is too old or user error.     Pharmacy is highly unlikely to take sensors back.     Replied to Izabela:  Hi Jt,   It's great that you were able to  the sensors, but I'm sorry they were costly and you're having a hard time getting them to work!    I believe the \"Whitney 2\" sensors were ordered for you. They will only work with the \"Freestyle Whitney 2\" armando.   It will not work with the \"Whitney Link\" or \"Linkup\" apps.     Can you double check you are trying the Freestyle Whitney 2 armando?   Are you confident you put the sensor on correctly and are now ready to start it?    Pharmacies usually can't take returns on medical equipment, so I think the best plan to is try to use the sensors you got, if possible, to get your money's worth out of them even if you don't go get any more.     The diabetes educator you saw was Kelly (Mago Cintron, I added her to your care team so that you can let her know if you are able to get it to work or if you need more help.     Thank you! Anastasiya Salinas RD, LD, [facility], LD, [facility]    "

## 2022-03-21 NOTE — PROGRESS NOTES
"Diabetes Self-Management Education & Support    Presents for:      SUBJECTIVE/OBJECTIVE:     Cultural Influences/Ethnic Background:  Not  or       Diabetes Symptoms & Complications:          Patient Problem List and Family Medical History reviewed for relevant medical history, current medical status, and diabetes risk factors.    Vitals:  Wt 95.6 kg (210 lb 11.2 oz)   BMI 29.80 kg/m    Estimated body mass index is 29.8 kg/m  as calculated from the following:    Height as of 2/7/22: 1.791 m (5' 10.5\").    Weight as of this encounter: 95.6 kg (210 lb 11.2 oz).   Last 3 BP:   BP Readings from Last 3 Encounters:   02/21/22 130/88   02/07/22 124/80   01/28/22 139/69       History   Smoking Status     Current Every Day Smoker     Packs/day: 0.50   Smokeless Tobacco     Never Used       Labs:  Lab Results   Component Value Date    A1C 14.7 01/24/2022     Lab Results   Component Value Date     02/07/2022     Lab Results   Component Value Date     04/04/2019     Direct Measure HDL   Date Value Ref Range Status   06/13/2016 37 (L) >=40 mg/dL Final   ]  GFR Estimate   Date Value Ref Range Status   02/07/2022 69 >60 mL/min/1.73m2 Final     Comment:     Effective December 21, 2021 eGFRcr in adults is calculated using the 2021 CKD-EPI creatinine equation which includes age and gender (Zulay ramirez al., NEJ, DOI: 10.1056/OEXQth1190064)   11/09/2020 51 (L) >60 mL/min/1.73m2 Final     GFR Estimate If Black   Date Value Ref Range Status   11/09/2020 >60 >60 mL/min/1.73m2 Final     Lab Results   Component Value Date    CR 1.22 02/07/2022     No results found for: MICROALBUMIN    Healthy Eating:       Being Active:       Monitoring:       Taking Medications:  Diabetes Medication(s)     Biguanides       metFORMIN (GLUCOPHAGE-XR) 500 MG 24 hr tablet    Take 4 tablets (2,000 mg) by mouth daily (with dinner)     Patient taking differently: Take 1,000 mg by mouth 2 times daily (with meals)     Insulin       insulin " glargine (LANTUS PEN) 100 UNIT/ML pen    Inject 30 Units Subcutaneous every morning (before breakfast)     insulin lispro (HUMALOG KWIKPEN) 100 UNIT/ML (1 unit dial) KWIKPEN    Inject 4 Units Subcutaneous 3 times daily (before meals) Adjust per insulin sliding scale based on blood sugar               Problem Solving:                 Reducing Risks:       Healthy Coping:     Patient Activation Measure Survey Score:  No flowsheet data found.    Diabetes knowledge and skills assessment:   Patient is knowledgeable in diabetes management concepts related to: Being Active, Monitoring and Taking Medication    Patient needs further education on the following diabetes management concepts: Healthy Eating, Monitoring, Taking Medication and Healthy Coping    Based on learning assessment above, most appropriate setting for further diabetes education would be: Individual setting.      INTERVENTIONS:    Education provided today on:  AADE Self-Care Behaviors:  Diabetes Pathophysiology  Healthy Eating: carbohydrate counting, consistency in amount, composition, and timing of food intake, heart healthy diet, eating out and portion control  Being Active: relationship to blood glucose, describe appropriate activity program and precautions to take  Monitoring: log and interpret results, individual blood glucose targets and frequency of monitoring  Taking Medication: action of prescribed medication, drawing up, administering and storing injectable diabetes medications, proper site selection and rotation for injections, side effects of prescribed medications and when to take medications  Problem Solving: high blood glucose - causes, signs/symptoms, treatment and prevention, low blood glucose - causes, signs/symptoms, treatment and prevention and carrying a carbohydrate source at all times  Healthy Coping: recognize feelings about diagnosis, benefits of making appropriate lifestyle changes and methods for coping with stress    Opportunities  "for ongoing education and support in diabetes-self management were discussed.    Pt verbalized understanding of concepts discussed and recommendations provided today.       Education Materials Provided:  [facility] with Diabetes Book and Carbohydrate Counting      ASSESSMENT:  Jt is here with his wife, Kassie, today for his diabetes education.  Stated he has had diabetes for about 10 years.  He was started on insulin 2 months ago previously he was taking Metformin and glipizide.  In February he suffered a stroke.  Stated he was told he had blockage and a blood clot.  This is resolved and he is doing well, his wife is very concerned about his overall health.    Jt is currently taking Lantus 30 units 1 time daily in the morning.  He is also taking Humalog 4 units plus sliding scale, of which he is not sure of today.  Said he does have this on a piece of paper at home.  He is taking his Humalog 3 times a day most days unless he is skipping a meal.  He is also taking Metformin extended release 2000 mg in the morning.  Stated he is taking 8 to 10 units before most meals.  When he is not checking his blood sugar before eating he uses a \"educated guess\" as to how much insulin he should take.    He is checking his blood sugars 1-3 times daily.  Stated this morning his fasting blood sugar was 205, tends to run higher in the afternoon, in the 300s.  Stated his highest reading he has had since hospitalization was 385 after having a burger.  His lowest reading was 178.  He is interested in getting a freestyle continuous glucose monitor so he does not have to poke his fingers.  Given that he is on insulin 4 times daily, will send a prescription to his local pharmacy.    Jt and his wife have already started making changes to his meal plan.  He is currently not working and is eating most of his meals at home.  We reviewed his current meal plan, which is noted below:  B- peanut butter toast on " whole-wheat  Coffee-sugar-free Macedonian vanilla creamer  2 cups  Eggs and hash [place]  Smaller portions  L-yesterday went out for lunch and had a pork at a sandwich and chips  Leftovers  Sandwiches  Meenakshi  D- dinner last night was a burger, a 1 pound burger  His son who also has diabetes lives at home  Steak  Chicken tacos  Chicken salad  S-candy  Brownies  Applesauce  Pudding  Nuts  Soda-diet  Vegetables-works most  Likes to grill  Fruit-loves canned  What is in season  Fish-yes  Nuts-yes  Reviewed carb counting, carb ID, the amount of carb to eat in a sitting as well as how often to eat throughout the day.  Discussed the role that protein and fiber play in glucose control.  Reviewed inexpensive healthier options for snacks as well as ways to add vegetables into his current meal plan.    Jt started physical therapy yesterday.  Stated he was instructed to wait in regards to adding weights to his activity.  Activity is currently restricted.  Advised to do what he is able and comfortable as this will also help with his blood sugar control.    Plan:  Increase Jt's Lantus dose to 36 units daily.  Reviewed Humalog instructions that were given at the time of discharge which include using 8 units with meals and 4 units with snacks plus a sliding scale of 2 units for every 50 points above 150.  This was written out for him today.  Asked Jt to continue working on his meal planning.  Asked to follow-up with me in 4 to 6 weeks, no appointment is needed at this time.    Much or all of the text in this note was generated through the use of the Dragon Dictate voice-to-text software. Errors in spelling or words which seem out of context are unintentional. Sound alike errors, in particular, may have escaped editing.        Patient's most recent   Lab Results   Component Value Date    A1C 14.7 01/24/2022    is not meeting goal of <8.0    PLAN  See Patient Instructions for co-developed, patient-stated behavior change  goals.  AVS printed and provided to patient today. See Follow-Up section for recommended follow-up.     The service provided today was under the supervising provider, Greg Moncada, who was available if needed.     Time Spent: 60 minutes  Encounter Type: Individual    Any diabetes medication dose changes were made via the CDE Protocol and Collaborative Practice Agreement with the patient's referring provider. A copy of this encounter was shared with the provider.

## 2022-03-22 ENCOUNTER — HOSPITAL ENCOUNTER (OUTPATIENT)
Dept: PHYSICAL THERAPY | Facility: REHABILITATION | Age: 57
Discharge: HOME | End: 2022-03-22
Payer: COMMERCIAL

## 2022-03-22 ENCOUNTER — MYC MEDICAL ADVICE (OUTPATIENT)
Dept: FAMILY MEDICINE | Facility: CLINIC | Age: 57
End: 2022-03-22

## 2022-03-22 DIAGNOSIS — R53.81 PHYSICAL DECONDITIONING: ICD-10-CM

## 2022-03-22 DIAGNOSIS — I63.512 ACUTE ISCHEMIC CEREBROVASCULAR ACCIDENT (CVA) INVOLVING LEFT MIDDLE CEREBRAL ARTERY TERRITORY (H): Primary | ICD-10-CM

## 2022-03-22 DIAGNOSIS — Z79.4 TYPE 2 DIABETES MELLITUS WITH OTHER CIRCULATORY COMPLICATION, WITH LONG-TERM CURRENT USE OF INSULIN (H): Primary | ICD-10-CM

## 2022-03-22 DIAGNOSIS — E11.59 TYPE 2 DIABETES MELLITUS WITH OTHER CIRCULATORY COMPLICATION, WITH LONG-TERM CURRENT USE OF INSULIN (H): Primary | ICD-10-CM

## 2022-03-22 DIAGNOSIS — M62.81 MUSCLE WEAKNESS (GENERALIZED): ICD-10-CM

## 2022-03-22 PROCEDURE — 97112 NEUROMUSCULAR REEDUCATION: CPT | Mod: GP | Performed by: PHYSICAL THERAPIST

## 2022-03-22 PROCEDURE — 97110 THERAPEUTIC EXERCISES: CPT | Mod: GP | Performed by: PHYSICAL THERAPIST

## 2022-03-22 NOTE — DISCHARGE INSTRUCTIONS
ADD   Counter push ups    Try on work bench first - a height that feels comfortable for shoulder but work for the muscles x5-10 reps working up to 20 push ups in a row 1-2x/day    ROWs with band at door    x10-20 reps 1-2x/day

## 2022-03-23 RX ORDER — INSULIN LISPRO 100 [IU]/ML
4 INJECTION, SOLUTION INTRAVENOUS; SUBCUTANEOUS
Qty: 45 ML | Refills: 3 | Status: SHIPPED | OUTPATIENT
Start: 2022-03-23 | End: 2023-04-11

## 2022-03-25 ENCOUNTER — TELEPHONE (OUTPATIENT)
Dept: FAMILY MEDICINE | Facility: CLINIC | Age: 57
End: 2022-03-25
Payer: COMMERCIAL

## 2022-03-25 NOTE — TELEPHONE ENCOUNTER
PA Initiation    Medication: insulin lispro (HUMALOG KWIKPEN) 100 UNIT/ML (1 unit dial) KWIKPEN  Insurance Company:  [employer]*  Pharmacy Filling the Rx: [facility]  KEY- [id number]   Filling Pharmacy Phone:  [phone]  Filling Pharmacy Fax:  [phone]  Start Date:  3/23/2022

## 2022-03-29 NOTE — TELEPHONE ENCOUNTER
Central Prior Authorization Team   Phone: [phone]    PA Initiation    Medication: insulin lispro (HUMALOG KWIKPEN) 100 UNIT/ML (1 unit dial) KWIKPEN  Insurance Company: [employer] - Phone [phone] Fax [phone]  Pharmacy Filling the Rx: [address] - [address] [place] [address]  Filling Pharmacy Phone: [phone]  Filling Pharmacy Fax:    Start Date: 3/29/2022

## 2022-03-31 NOTE — TELEPHONE ENCOUNTER
Prior Authorization Approval    Authorization Effective Date: 2/27/2022  Authorization Expiration Date: 3/29/2023  Medication: insulin lispro (HUMALOG KWIKPEN) 100 UNIT/ML (1 unit dial) KWIKPEN  Approved Dose/Quantity:   Reference #:     Insurance Company: [employer] - Phone [phone] Fax [phone]  Expected CoPay:       CoPay Card Available:      Foundation Assistance Needed:    Which Pharmacy is filling the prescription (Not needed for infusion/clinic administered): [facility] [place] [address]  Pharmacy Notified: Yes  Patient Notified: Yes

## 2022-04-20 ENCOUNTER — OFFICE VISIT (OUTPATIENT)
Dept: FAMILY MEDICINE | Facility: CLINIC | Age: 57
End: 2022-04-20
Payer: COMMERCIAL

## 2022-04-20 VITALS
DIASTOLIC BLOOD PRESSURE: 80 MMHG | HEART RATE: 106 BPM | TEMPERATURE: 98.4 F | HEIGHT: 69 IN | BODY MASS INDEX: 31.99 KG/M2 | WEIGHT: 216 LBS | OXYGEN SATURATION: 98 % | SYSTOLIC BLOOD PRESSURE: 122 MMHG | RESPIRATION RATE: 16 BRPM

## 2022-04-20 DIAGNOSIS — Z11.4 SCREENING FOR HIV (HUMAN IMMUNODEFICIENCY VIRUS): ICD-10-CM

## 2022-04-20 DIAGNOSIS — I63.512 ACUTE ISCHEMIC CEREBROVASCULAR ACCIDENT (CVA) INVOLVING LEFT MIDDLE CEREBRAL ARTERY TERRITORY (H): ICD-10-CM

## 2022-04-20 DIAGNOSIS — Z12.11 SCREEN FOR COLON CANCER: ICD-10-CM

## 2022-04-20 DIAGNOSIS — F17.200 TOBACCO DEPENDENCE SYNDROME: ICD-10-CM

## 2022-04-20 DIAGNOSIS — I10 ESSENTIAL HYPERTENSION: ICD-10-CM

## 2022-04-20 DIAGNOSIS — Z79.4 TYPE 2 DIABETES MELLITUS WITH OTHER CIRCULATORY COMPLICATION, WITH LONG-TERM CURRENT USE OF INSULIN (H): ICD-10-CM

## 2022-04-20 DIAGNOSIS — E78.5 HYPERLIPIDEMIA, UNSPECIFIED HYPERLIPIDEMIA TYPE: ICD-10-CM

## 2022-04-20 DIAGNOSIS — Z11.59 NEED FOR HEPATITIS C SCREENING TEST: ICD-10-CM

## 2022-04-20 DIAGNOSIS — I63.232 STENOSIS OF LEFT INTERNAL CAROTID ARTERY WITH CEREBRAL INFARCTION (H): ICD-10-CM

## 2022-04-20 DIAGNOSIS — E11.59 TYPE 2 DIABETES MELLITUS WITH OTHER CIRCULATORY COMPLICATION, WITH LONG-TERM CURRENT USE OF INSULIN (H): ICD-10-CM

## 2022-04-20 DIAGNOSIS — Z00.00 ROUTINE PHYSICAL EXAMINATION: Primary | ICD-10-CM

## 2022-04-20 LAB
CREAT UR-MCNC: 228 MG/DL
MICROALBUMIN UR-MCNC: 23.86 MG/DL (ref 0–1.99)
MICROALBUMIN/CREAT UR: 104.6 MG/G CR

## 2022-04-20 PROCEDURE — 82043 UR ALBUMIN QUANTITATIVE: CPT | Performed by: FAMILY MEDICINE

## 2022-04-20 PROCEDURE — 99396 PREV VISIT EST AGE 40-64: CPT | Performed by: FAMILY MEDICINE

## 2022-04-20 RX ORDER — ATORVASTATIN CALCIUM 20 MG/1
20 TABLET, FILM COATED ORAL AT BEDTIME
Qty: 90 TABLET | Refills: 4 | Status: SHIPPED | OUTPATIENT
Start: 2022-04-20 | End: 2023-06-26

## 2022-04-20 ASSESSMENT — PATIENT HEALTH QUESTIONNAIRE - PHQ9
10. IF YOU CHECKED OFF ANY PROBLEMS, HOW DIFFICULT HAVE THESE PROBLEMS MADE IT FOR YOU TO DO YOUR WORK, TAKE CARE OF THINGS AT HOME, OR GET ALONG WITH OTHER PEOPLE: NOT DIFFICULT AT ALL
SUM OF ALL RESPONSES TO PHQ QUESTIONS 1-9: 3
SUM OF ALL RESPONSES TO PHQ QUESTIONS 1-9: 3

## 2022-04-20 ASSESSMENT — ANXIETY QUESTIONNAIRES
GAD7 TOTAL SCORE: 5
6. BECOMING EASILY ANNOYED OR IRRITABLE: MORE THAN HALF THE DAYS
2. NOT BEING ABLE TO STOP OR CONTROL WORRYING: NOT AT ALL
4. TROUBLE RELAXING: SEVERAL DAYS
1. FEELING NERVOUS, ANXIOUS, OR ON EDGE: NOT AT ALL
GAD7 TOTAL SCORE: 5
GAD7 TOTAL SCORE: 5
5. BEING SO RESTLESS THAT IT IS HARD TO SIT STILL: SEVERAL DAYS
3. WORRYING TOO MUCH ABOUT DIFFERENT THINGS: SEVERAL DAYS
7. FEELING AFRAID AS IF SOMETHING AWFUL MIGHT HAPPEN: NOT AT ALL
7. FEELING AFRAID AS IF SOMETHING AWFUL MIGHT HAPPEN: NOT AT ALL

## 2022-04-20 NOTE — PROGRESS NOTES
Assessment/Plan:     Routine physical examination  Encouraged healthy lifestyle habits including regular exercise, healthy eating habits, and adequate calcium and vitamin D intake.  Advised tobacco cessation, he is not yet ready and declines assistance.  Advise COVID-vaccine, shingles vaccine, he declines today.  He is agreeable to future screening for HIV and hepatitis C.  Will do this at a future lab only visit.  We will check lipids at that time as well.  Discussed prostate cancer screening which he declines today.  Discussed lung cancer screening which she declines today.  Discussed colon cancer screening, he is agreeable to Cologuard.    Type 2 diabetes mellitus with other circulatory complication, with long-term current use of insulin (H)  Overall stable.  We do not address his diabetes today, he will return for lab only visit after April 28 to obtain A1c and will plan further follow-up from there.  Will obtain urine microalbumin today.  - Albumin Random Urine Quantitative with Creat Ratio; Future  - Comprehensive metabolic panel; Future    Hyperlipidemia, unspecified hyperlipidemia type  Not tolerating atorvastatin 40 mg daily well.  We will have him reduce further to 20 mg daily.  Update me if lack of resolution of leg cramping.  I am hesitant to stop the medication entirely in the setting of his recent ischemic stroke.  - atorvastatin (LIPITOR) 20 MG tablet; Take 1 tablet (20 mg) by mouth At Bedtime    Hypertension  Adequately controlled on lisinopril.    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  He is needing to establish care with a vascular surgeon, referral is placed.  Will order ultrasound to be performed prior to that visit per previous vascular surgery's recommendations.  - Vascular Surgery Referral; Future    Tobacco dependence syndrome  Advised  cessation.  He is in the precontemplative state and declines assistance for now.    Screen for colon cancer  Discussed options, order placed for Cologuard.  - COLOGUARD([employer])    Screening for HIV (human immunodeficiency virus)  Obtain future screening for HIV.  - HIV Antigen Antibody Combo; Future    Need for hepatitis C screening test  Will obtain future screening for hepatitis C.  - Hepatitis C Screen Reflex to HCV RNA Quant and Genotype; Future        Please abstract the following data from this visit with this patient into the appropriate field in Epic:    Tests that can be patient reported without a hard copy:    Eye exam with ophthalmology on this date: 12/10/21    Other Tests found in the patient's chart through Chart Review/Care Everywhere:    Note to Abstraction: If this section is blank, no results were found via Chart Review/Care Everywhere.          Patient Instructions   Lets cut your atorvastatin dose in half again to 20 mg daily at bedtime.  Let me know if your leg symptoms do not resolve.    I encourage you to quit smoking.  Let me know if you are requiring assistance, and let me know when you are ready.    I have placed a referral to vascular surgery, they will call you.  You are also welcome to call them.    Return for fasting lab only visit after April 28 for routine labs including A1c.    I recommend the COVID-vaccine series.  Consider Shingrix (the new shingles vaccine, 2 dose series) to reduce your risk of shingles.  Check your insurance coverage first.   Preventive Health Recommendations  Male Ages 50 - 64    Yearly exam:             See your health care provider every year in order to  o   Review health changes.   o   Discuss preventive care.    o   Review your medicines if your doctor has prescribed any.     Have a cholesterol test every 5 years, or more frequently if you are at risk for high cholesterol/heart disease.     Have a diabetes test (fasting glucose) every three years.  If you are at risk for diabetes, you should have this test more often.     Have a colonoscopy at age 50, or have a yearly FIT test (stool test). These exams will check for colon cancer.      Talk with your health care provider about whether or not a prostate cancer screening test (PSA) is right for you.    You should be tested each year for STDs (sexually transmitted diseases), if you re at risk.     Shots: Get a flu shot each year. Get a tetanus shot every 10 years.     Nutrition:    Eat at least 5 servings of fruits and vegetables daily.     Eat whole-grain bread, whole-wheat pasta and brown rice instead of white grains and rice.     Get adequate Calcium and Vitamin D.     Lifestyle    Exercise for at least 150 minutes a week (30 minutes a day, 5 days a week). This will help you control your weight and prevent disease.     Limit alcohol to one drink per day.     No smoking.     Wear sunscreen to prevent skin cancer.     See your dentist every six months for an exam and cleaning.     See your eye doctor every 1 to 2 years.         Return in about 4 months (around 8/20/2022) for Follow up diabetes.         Subjective:     Jt Venegas is a 56 year old male who presents for an annual exam.     He is accompanied today by his wife.  Ischemic stroke in January of this year, status post left carotid endarterectomy, remains on Plavix.  Needs to establish with a new vascular surgeon and needs ultrasound prior to that.  History of type 2 diabetes, remains on Lantus 36 units, no longer using mealtime insulin, blood sugars in the 130s to 140s in the morning and 110s in the evening.  Tolerating metformin well.  Not yet due for A1c.  Remains on atorvastatin management of dyslipidemia, noting ongoing muscle and leg aching though improved from previous.  Hypertension managed with lisinopril.  Continues to smoke about three quarters of a pack per day not yet ready to discuss quitting.  He is not interested in lung cancer  screening.  He is not interested in prostate cancer screening.  Remains on sildenafil and management of erectile dysfunction.    Healthy Habits:     Getting at least 3 servings of Calcium per day:  Yes    Bi-annual eye exam:  Yes    Dental care twice a year:  NO    Sleep apnea or symptoms of sleep apnea:  None    Diet:  Diabetic    Frequency of exercise:  6-7 days/week    Duration of exercise:  15-30 minutes    Taking medications regularly:  Yes    Medication side effects:  Muscle aches    PHQ-2 Total Score: 1    Additional concerns today:  No         Immunization History   Administered Date(s) Administered     Influenza Vaccine IM > 6 months Valent IIV4 (Alfuria,Fluzone) 02/02/2022     Pneumococcal 23 valent 06/13/2016     Td (Adult), Adsorbed 05/25/1999     Tdap (Adacel,Boostrix) 04/04/2019     Immunization status: He declines COVID-vaccine, shingles vaccine    Current Outpatient Medications   Medication Sig Dispense Refill     aspirin 81 MG EC tablet [ASPIRIN 81 MG EC TABLET] Take 81 mg by mouth daily.       atorvastatin (LIPITOR) 20 MG tablet Take 1 tablet (20 mg) by mouth At Bedtime 90 tablet 4     blood-glucose meter Misc [BLOOD-GLUCOSE METER MISC] Use 1 Device As Directed 2 (two) times a day. 1 each 0     clopidogrel (PLAVIX) 75 MG tablet Take 1 tablet (75 mg) by mouth daily 90 tablet 4     insulin glargine (LANTUS PEN) 100 UNIT/ML pen Inject 36 Units Subcutaneous every morning (before breakfast) 45 mL 3     insulin lispro (HUMALOG KWIKPEN) 100 UNIT/ML (1 unit dial) KWIKPEN Inject 4 Units Subcutaneous 3 times daily (before meals) Adjust per insulin sliding scale based on blood sugar. Estimate 40 units per day. 45 mL 3     insulin pen needle (32G X 6 MM) 32G X 6 MM miscellaneous Use 4 pen needles daily or as directed. 200 each 1     lisinopril (ZESTRIL) 10 MG tablet Take 1 tablet (10 mg) by mouth daily 90 tablet 2     metFORMIN (GLUCOPHAGE-XR) 500 MG 24 hr tablet Take 4 tablets (2,000 mg) by mouth daily (with  dinner) (Patient taking differently: Take 1,000 mg by mouth 2 times daily (with meals)) 360 tablet 1     nicotine (NICODERM CQ) 7 MG/24HR 24 hr patch Place 1 patch onto the skin every 24 hours 30 patch 11     sildenafiL (VIAGRA) 50 MG tablet [SILDENAFIL (VIAGRA) 50 MG TABLET] Take 1 tablet (50 mg total) by mouth as needed for erectile dysfunction. 20 tablet 11     TRUETRACK TEST strips [TRUETRACK TEST STRIPS] TEST BLOOD SUGAR TWICE DAILY OR AS DIRECTED. 200 strip 0     No past medical history on file.  Past Surgical History:   Procedure Laterality Date     APPENDECTOMY       HERNIA REPAIR       Hydrochlorothiazide  Family History   Problem Relation Age of Onset     Diabetes Mother      Cancer Mother         lung and brain     Cancer Father         melanoma     Social History     Socioeconomic History     Marital status:      Spouse name: Not on file     Number of children: Not on file     Years of education: Not on file     Highest education level: Not on file   Occupational History     Not on file   Tobacco Use     Smoking status: Current Every Day Smoker     Packs/day: 0.50     Smokeless tobacco: Never Used   Substance and Sexual Activity     Alcohol use: Yes     Comment: one drink every 2 weeks     Drug use: Never     Sexual activity: Not on file   Other Topics Concern     Not on file   Social History Narrative     Not on file     Social Determinants of Health     Financial Resource Strain: Not on file   Food Insecurity: Not on file   Transportation Needs: Not on file   Physical Activity: Not on file   Stress: Not on file   Social Connections: Not on file   Intimate Partner Violence: Not on file   Housing Stability: Not on file       Review of Systems  General:  Denies problem  Eyes: Denies problem  Ears/Nose/Throat: Denies problem  Cardiovascular: Denies problem  Respiratory:  Denies problem  Gastrointestinal:  Denies problem   Genitourinary: Denies problem  Musculoskeletal:  Denies problem  Skin: Denies  "problem  Neurologic: Denies problem  Psychiatric: Denies problem  Endocrine: Denies problem  Heme/Lymphatic: Denies problem   Allergic/Immunologic: Denies problem           Objective:        Vitals:    04/20/22 0756   BP: 122/80   Pulse: 106   Resp: 16   Temp: 98.4  F (36.9  C)   TempSrc: Oral   SpO2: 98%   Weight: 98 kg (216 lb)   Height: 1.753 m (5' 9\")     Body mass index is 31.9 kg/m .    Physical Exam:  General Appearance: Alert, pleasant, appears stated age  Head: Normocephalic, without obvious abnormality  Eyes: PERRL, conjunctiva/corneas clear, EOM's intact  Ears: Normal TM's and external ear canals, both ears  Nose: Nares normal, septum midline,mucosa normal, no drainage  Throat: Lips, mucosa, and tongue normal; teeth and gums normal; oropharynx is clear  Neck: Supple,without lymphadenopathy or thyromegally  Lungs: Clear to auscultation bilaterally, respirations unlabored  Heart: Regular rate and rhythm, no murmur   Abdomen: Soft, non-tender, no masses, no organomegaly  Extremities: Extremities with strong and symmetric pulses, no cyanosis or edema  Skin: Skin color, texture normal, no rashes or lesions  Neurologic: Mildly reduced dexterity right hand, mild atrophy             This note has been dictated using voice recognition software. Any grammatical or context distortions are unintentional and inherent to the the software.    Answers for HPI/ROS submitted by the patient on 4/20/2022  If you checked off any problems, how difficult have these problems made it for you to do your work, take care of things at home, or get along with other people?: Not difficult at all  PHQ9 TOTAL SCORE: 3  JUAN CARLOS 7 TOTAL SCORE: 5      "

## 2022-04-20 NOTE — PATIENT INSTRUCTIONS
Lets cut your atorvastatin dose in half again to 20 mg daily at bedtime.  Let me know if your leg symptoms do not resolve.    I encourage you to quit smoking.  Let me know if you are requiring assistance, and let me know when you are ready.    I have placed a referral to vascular surgery, they will call you.  You are also welcome to call them.    Return for fasting lab only visit after April 28 for routine labs including A1c.    I recommend the COVID-vaccine series.  Consider Shingrix (the new shingles vaccine, 2 dose series) to reduce your risk of shingles.  Check your insurance coverage first.   Preventive Health Recommendations  Male Ages 50 - 64    Yearly exam:             See your health care provider every year in order to  o   Review health changes.   o   Discuss preventive care.    o   Review your medicines if your doctor has prescribed any.     Have a cholesterol test every 5 years, or more frequently if you are at risk for high cholesterol/heart disease.     Have a diabetes test (fasting glucose) every three years. If you are at risk for diabetes, you should have this test more often.     Have a colonoscopy at age 50, or have a yearly FIT test (stool test). These exams will check for colon cancer.      Talk with your health care provider about whether or not a prostate cancer screening test (PSA) is right for you.    You should be tested each year for STDs (sexually transmitted diseases), if you re at risk.     Shots: Get a flu shot each year. Get a tetanus shot every 10 years.     Nutrition:    Eat at least 5 servings of fruits and vegetables daily.     Eat whole-grain bread, whole-wheat pasta and brown rice instead of white grains and rice.     Get adequate Calcium and Vitamin D.     Lifestyle    Exercise for at least 150 minutes a week (30 minutes a day, 5 days a week). This will help you control your weight and prevent disease.     Limit alcohol to one drink per day.     No smoking.     Wear sunscreen  to prevent skin cancer.     See your dentist every six months for an exam and cleaning.     See your eye doctor every 1 to 2 years.

## 2022-04-21 ASSESSMENT — ANXIETY QUESTIONNAIRES: GAD7 TOTAL SCORE: 5

## 2022-04-21 ASSESSMENT — PATIENT HEALTH QUESTIONNAIRE - PHQ9: SUM OF ALL RESPONSES TO PHQ QUESTIONS 1-9: 3

## 2022-05-01 ENCOUNTER — TRANSFERRED RECORDS (OUTPATIENT)
Dept: MULTI SPECIALTY CLINIC | Facility: CLINIC | Age: 57
End: 2022-05-01

## 2022-05-01 LAB — RETINOPATHY: NORMAL

## 2022-05-11 DIAGNOSIS — I10 ESSENTIAL HYPERTENSION: ICD-10-CM

## 2022-05-11 RX ORDER — LISINOPRIL 10 MG/1
10 TABLET ORAL DAILY
Qty: 90 TABLET | Refills: 2 | Status: SHIPPED | OUTPATIENT
Start: 2022-05-11 | End: 2023-04-12

## 2022-05-11 NOTE — TELEPHONE ENCOUNTER
Refill Request  Medication name: Pending Prescriptions:                       Disp   Refills    lisinopril (ZESTRIL) 10 MG tablet         90 tab*2            Sig: Take 1 tablet (10 mg) by mouth daily    Who prescribed the medication: Kylie Armijo  Last refill on medication: 12/10/2021  Requested Pharmacy[facility]  Last appointment with PCP: 04/20/22  Next appointment: Not due

## 2022-05-16 ENCOUNTER — ANCILLARY PROCEDURE (OUTPATIENT)
Dept: VASCULAR ULTRASOUND | Facility: CLINIC | Age: 57
End: 2022-05-16
Attending: FAMILY MEDICINE

## 2022-05-16 ENCOUNTER — OFFICE VISIT (OUTPATIENT)
Dept: VASCULAR SURGERY | Facility: CLINIC | Age: 57
End: 2022-05-16
Attending: FAMILY MEDICINE

## 2022-05-16 VITALS
SYSTOLIC BLOOD PRESSURE: 110 MMHG | TEMPERATURE: 98.1 F | HEART RATE: 92 BPM | RESPIRATION RATE: 16 BRPM | DIASTOLIC BLOOD PRESSURE: 68 MMHG

## 2022-05-16 DIAGNOSIS — I63.232 STENOSIS OF LEFT INTERNAL CAROTID ARTERY WITH CEREBRAL INFARCTION (H): ICD-10-CM

## 2022-05-16 DIAGNOSIS — I63.512 ACUTE ISCHEMIC CEREBROVASCULAR ACCIDENT (CVA) INVOLVING LEFT MIDDLE CEREBRAL ARTERY TERRITORY (H): ICD-10-CM

## 2022-05-16 DIAGNOSIS — E78.5 HYPERLIPIDEMIA, UNSPECIFIED HYPERLIPIDEMIA TYPE: Primary | ICD-10-CM

## 2022-05-16 PROCEDURE — G0463 HOSPITAL OUTPT CLINIC VISIT: HCPCS

## 2022-05-16 PROCEDURE — 93880 EXTRACRANIAL BILAT STUDY: CPT | Mod: 26 | Performed by: SURGERY

## 2022-05-16 PROCEDURE — 99203 OFFICE O/P NEW LOW 30 MIN: CPT | Performed by: SURGERY

## 2022-05-16 PROCEDURE — 93880 EXTRACRANIAL BILAT STUDY: CPT

## 2022-05-16 RX ORDER — ATORVASTATIN CALCIUM 40 MG/1
40 TABLET, FILM COATED ORAL DAILY
Qty: 90 TABLET | Refills: 3 | Status: SHIPPED | OUTPATIENT
Start: 2022-05-16 | End: 2023-04-11 | Stop reason: DRUGHIGH

## 2022-05-16 ASSESSMENT — PAIN SCALES - GENERAL: PAINLEVEL: NO PAIN (0)

## 2022-05-16 NOTE — PROGRESS NOTES
[facility]        Patient is here for a consult to discuss Carotid stenosis. Patient has no symptoms. Left CEA 2/11/22 with Austin Joe MD who is now out of network.  Dr. Armijo referring. CVA 2/2022. CTA done in Jan. US completed prior to visit. Patient working on quitting smoking, cut down to 0.5 ppd. Used to smoke 1.5 ppd.     Pt is currently taking Aspirin, Statin and Plavix.    /68   Pulse 92   Temp 98.1  F (36.7  C) (Oral)   Resp 16     The provider has been notified that the patient has no concerns.     Questions patient would like addressed today are: Wondering if the blood flow can affect his blood flow to his left shoulder, has torn rotator cuff.     Refills are needed: N/A    Has homecare services and agency name:  Opal HAHN RN

## 2022-05-16 NOTE — PROGRESS NOTES
VASCULAR SURGERY OUTPATIENT CONSULT OR VISIT   VASCULAR SURGEON: Mariana Alegria MD    LOCATION:  [facility]    Jt Venegas   Medical Record #:  [id number]  YOB: 1965  Age:  56 year old     Date of Service: 5/16/2022    PRIMARY CARE PROVIDER: Kylie Armijo      Reason for consultation: Evaluation of carotid disease    IMPRESSION: Patient who had a small left hemispheric stroke leaving him with right arm numbness in January.  Because of bed shortage had to be transferred from our ER to [facility] where he underwent completion work-up and left carotid endarterectomy for symptomatic disease.  Never saw his surgeon in follow-up as his insurance does not cover [facility].  Has cut back on his smoking to half a pack of cigarettes a day.  On a statin and aspirin therapy.  On carotid duplex shows no residual narrowing status post left endarterectomy.    RECOMMENDATION: Overall doing very well.  We will increase his statin to 40 mg of Lipitor daily.  Strongly encouraged him to continue to work with his primary care physician towards smoking cessation.  We will see him through our PAs clinic in 6 months time with repeat carotid duplex.  If that looks good I will see him the following year.    HPI:  Jt Venegas is a 56 year old male who was seen today for surveillance after left carotid endarterectomy done for symptomatic carotid disease.  Patient had come to ER with right arm numbness and evidence of a stroke.  Unfortunately were no hospital beds in our system and so he was sent to [facility] where he underwent urgent work-up and endarterectomy.  Has done well from this without any sequelae.    Patient is a lifelong smoker.  Has been able to cut back to half a pack of cigarettes a day.  Working with his primary care provider to try to quit completely.  Has been started on statin and aspirin therapy and is tolerating this without any side effects.  Still on a  low-dose of statin only.    Personal history is otherwise fairly benign with a history of type 2 diabetes which she feels is under better control now.  Has been diabetic for 5 years.  Feels that he has rotator cuff symptoms.  Works hard in construction.  No other major prior surgeries.    Family history is negative for stroke.  His mother  of cancer and did have diabetes when she was in her 70s.  Father lived to age 92.  Siblings were healthy.    No other new health issues.    [facility]:  No past medical history on file.     Past Surgical History:   Procedure Laterality Date     APPENDECTOMY       HERNIA REPAIR         ALLERGIES:  Hydrochlorothiazide    MEDS:    Current Outpatient Medications:      aspirin 81 MG EC tablet, [ASPIRIN 81 MG EC TABLET] Take 81 mg by mouth daily., Disp: , Rfl:      atorvastatin (LIPITOR) 20 MG tablet, Take 1 tablet (20 mg) by mouth At Bedtime, Disp: 90 tablet, Rfl: 4     blood-glucose meter Misc, [BLOOD-GLUCOSE METER MISC] Use 1 Device As Directed 2 (two) times a day., Disp: 1 each, Rfl: 0     clopidogrel (PLAVIX) 75 MG tablet, Take 1 tablet (75 mg) by mouth daily, Disp: 90 tablet, Rfl: 4     insulin glargine (LANTUS PEN) 100 UNIT/ML pen, Inject 36 Units Subcutaneous every morning (before breakfast), Disp: 45 mL, Rfl: 3     insulin lispro (HUMALOG KWIKPEN) 100 UNIT/ML (1 unit dial) KWIKPEN, Inject 4 Units Subcutaneous 3 times daily (before meals) Adjust per insulin sliding scale based on blood sugar. Estimate 40 units per day., Disp: 45 mL, Rfl: 3     lisinopril (ZESTRIL) 10 MG tablet, Take 1 tablet (10 mg) by mouth daily, Disp: 90 tablet, Rfl: 2     metFORMIN (GLUCOPHAGE-XR) 500 MG 24 hr tablet, Take 4 tablets (2,000 mg) by mouth daily (with dinner) (Patient taking differently: Take 1,000 mg by mouth 2 times daily (with meals)), Disp: 360 tablet, Rfl: 1     nicotine (NICODERM CQ) 7 MG/24HR 24 hr patch, Place 1 patch onto the skin every 24 hours, Disp: 30 patch, Rfl: 11     sildenafiL  (VIAGRA) 50 MG tablet, [SILDENAFIL (VIAGRA) 50 MG TABLET] Take 1 tablet (50 mg total) by mouth as needed for erectile dysfunction., Disp: 20 tablet, Rfl: 11     TRUETRACK TEST strips, [TRUETRACK TEST STRIPS] TEST BLOOD SUGAR TWICE DAILY OR AS DIRECTED., Disp: 200 strip, Rfl: 0     insulin pen needle (32G X 6 MM) 32G X 6 MM miscellaneous, Use 4 pen needles daily or as directed., Disp: 200 each, Rfl: 1    SOCIAL HABITS:    History   Smoking Status     Current Every Day Smoker     Packs/day: 0.50   Smokeless Tobacco     Never Used     Social History    Substance and Sexual Activity      Alcohol use: Yes        Comment: one drink every 2 weeks      History   Drug Use Unknown       FAMILY HISTORY:    Family History   Problem Relation Age of Onset     Diabetes Mother      Cancer Mother         lung and brain     Cancer Father         melanoma       REVIEW OF SYSTEMS:    A 12 point ROS was reviewed and except for what is listed in the HPI above, all others are negative    PE:  /68   Pulse 92   Temp 98.1  F (36.7  C) (Oral)   Resp 16   Wt Readings from Last 1 Encounters:   04/20/22 216 lb (98 kg)     There is no height or weight on file to calculate BMI.    EXAM:  GENERAL: This is a well-developed 56 year old male who appears his stated age  EYES: Grossly normal.  MOUTH: Buccal mucosa normal   MUSCULOSKELETAL: Grossly normal and both lower extremities are intact.  HEME/LYMPH: No lymphedema  NEUROLOGIC: Focally intact, Alert and oriented x 3.   PSYCH: appropriate affect  INTEGUMENT: No open lesions or ulcers      DIAGNOSTIC STUDIES:     Images:  No results found.    I personally reviewed the images and my interpretation is that his carotid duplex was a widely patent left carotid artery status post endarterectomy..    LABS:      Sodium   Date Value Ref Range Status   02/07/2022 140 136 - 145 mmol/L Final   01/28/2022 139 136 - 145 mmol/L Final   11/09/2020 132 (L) 136 - 145 mmol/L Final     Urea Nitrogen   Date Value  Ref Range Status   02/07/2022 14 8 - 22 mg/dL Final   01/28/2022 16 8 - 22 mg/dL Final   11/09/2020 18 8 - 22 mg/dL Final     Hemoglobin   Date Value Ref Range Status   01/28/2022 11.9 (L) 13.3 - 17.7 g/dL Final   01/24/2022 13.0 (L) 13.3 - 17.7 g/dL Final     Platelet Count   Date Value Ref Range Status   01/28/2022 349 150 - 450 10e3/uL Final   01/24/2022 307 150 - 450 10e3/uL Final     INR   Date Value Ref Range Status   01/28/2022 0.98 0.85 - 1.15 Final         Mariana Alegria MD, MD  VASCULAR SURGERY

## 2022-05-16 NOTE — PATIENT INSTRUCTIONS
Milana Venegas ,    Thank you for entrusting your care with us today. After your visit today with Jaci   this is the plan that was discussed at your appointment.    Begin taking atorvastatin 40 mg once daily. A prescription was sent for this.    2. Work on quitting smoking.    3. Follow up in 6 months with DANNY Layton.      I am including additional information on these things and our contact information if you have any questions or concerns.   Please do not hesitate to reach out to us if you felt we did not answer your questions or you are unsure of the treatment plan after your visit today. Our number is [phone].Thank you for trusting us with your care.         Again thank you for your time.     MARY HAHN RN

## 2022-05-22 ENCOUNTER — HEALTH MAINTENANCE LETTER (OUTPATIENT)
Age: 57
End: 2022-05-22

## 2022-06-03 PROBLEM — Z86.73 HISTORY OF STROKE: Status: ACTIVE | Noted: 2022-02-07

## 2022-06-14 ENCOUNTER — TRANSFERRED RECORDS (OUTPATIENT)
Dept: HEALTH INFORMATION MANAGEMENT | Facility: CLINIC | Age: 57
End: 2022-06-14

## 2022-08-10 ENCOUNTER — MYC MEDICAL ADVICE (OUTPATIENT)
Dept: FAMILY MEDICINE | Facility: CLINIC | Age: 57
End: 2022-08-10

## 2022-08-10 DIAGNOSIS — E11.59 TYPE 2 DIABETES MELLITUS WITH OTHER CIRCULATORY COMPLICATION, WITH LONG-TERM CURRENT USE OF INSULIN (H): Primary | ICD-10-CM

## 2022-08-10 DIAGNOSIS — Z79.4 TYPE 2 DIABETES MELLITUS WITH OTHER CIRCULATORY COMPLICATION, WITH LONG-TERM CURRENT USE OF INSULIN (H): Primary | ICD-10-CM

## 2022-08-11 NOTE — TELEPHONE ENCOUNTER
Routing to PCP to advise if there is anything else that can prescribed for patient as it is to expensive for him to pay out of pocket for his insulin.     Thanks,  Mandie Jaeger RN on 8/11/2022 at 7:11 AM

## 2022-08-16 RX ORDER — INSULIN ASPART 100 [IU]/ML
INJECTION, SOLUTION INTRAVENOUS; SUBCUTANEOUS
Qty: 15 ML | Refills: 0 | Status: SHIPPED | OUTPATIENT
Start: 2022-08-16 | End: 2022-11-15

## 2022-08-16 RX ORDER — INSULIN DEGLUDEC INJECTION 100 U/ML
36 INJECTION, SOLUTION SUBCUTANEOUS DAILY
Qty: 15 ML | Refills: 0 | Status: SHIPPED | OUTPATIENT
Start: 2022-08-16 | End: 2022-11-15

## 2022-08-16 NOTE — TELEPHONE ENCOUNTER
Free insulin through [employer] emergency supply program sent - order for Tresiba and Novolog to replace lantus and humalog. I will call pharmacy to verify that they ran it through the voucher correctly.

## 2022-09-08 DIAGNOSIS — E11.9 TYPE 2 DIABETES MELLITUS WITHOUT COMPLICATION, WITHOUT LONG-TERM CURRENT USE OF INSULIN (H): ICD-10-CM

## 2022-09-09 RX ORDER — METFORMIN HCL 500 MG
2000 TABLET, EXTENDED RELEASE 24 HR ORAL
Qty: 360 TABLET | Refills: 0 | Status: SHIPPED | OUTPATIENT
Start: 2022-09-09 | End: 2023-06-26

## 2022-09-09 NOTE — TELEPHONE ENCOUNTER
"Routing refill request to provider for review/approval because:  Labs not current:  A1C  A break in medication  Patient reports taking medication differently.    Last Written Prescription Date:  8/23/21  Last Fill Quantity: 360,  # refills: 1   Last office visit provider:  4/20/22     Requested Prescriptions   Pending Prescriptions Disp Refills     metFORMIN (GLUCOPHAGE XR) 500 MG 24 hr tablet 360 tablet 1     Sig: Take 4 tablets (2,000 mg) by mouth daily (with dinner)       Biguanide Agents Failed - 9/9/2022  3:22 PM        Failed - Patient has documented A1c within the specified period of time.     If HgbA1C is 8 or greater, it needs to be on file within the past 3 months.  If less than 8, must be on file within the past 6 months.     Recent Labs   Lab Test 01/24/22  1241   A1C 14.7*             Passed - Patient is age 10 or older        Passed - Patient's CR is NOT>1.4 OR Patient's EGFR is NOT<45 within past 12 mos.     Recent Labs   Lab Test 02/07/22  1250 01/28/22  0101 11/09/20  1729   GFRESTIMATED 69   < > 51*   GFRESTBLACK  --   --  >60    < > = values in this interval not displayed.       Recent Labs   Lab Test 02/07/22  1250   CR 1.22             Passed - Patient does NOT have a diagnosis of CHF.        Passed - Medication is active on med list        Passed - Recent (6 mo) or future (30 days) visit within the authorizing provider's specialty     Patient had office visit in the last 6 months or has a visit in the next 30 days with authorizing provider or within the authorizing provider's specialty.  See \"Patient Info\" tab in inbasket, or \"Choose Columns\" in Meds & Orders section of the refill encounter.                 Anthony Beach RN 09/09/22 3:23 PM  "

## 2022-09-25 ENCOUNTER — HEALTH MAINTENANCE LETTER (OUTPATIENT)
Age: 57
End: 2022-09-25

## 2022-11-06 ENCOUNTER — MYC MEDICAL ADVICE (OUTPATIENT)
Dept: FAMILY MEDICINE | Facility: CLINIC | Age: 57
End: 2022-11-06

## 2022-11-07 NOTE — TELEPHONE ENCOUNTER
Routed to Diabetes educator and PharmD to assist patient with insulin issues. Out of meds and MA pending. Sandra Machuca RN on 11/7/2022 at 10:42 AM

## 2022-11-15 ENCOUNTER — MYC MEDICAL ADVICE (OUTPATIENT)
Dept: FAMILY MEDICINE | Facility: CLINIC | Age: 57
End: 2022-11-15

## 2022-11-15 DIAGNOSIS — E11.59 TYPE 2 DIABETES MELLITUS WITH OTHER CIRCULATORY COMPLICATION, WITH LONG-TERM CURRENT USE OF INSULIN (H): ICD-10-CM

## 2022-11-15 DIAGNOSIS — Z79.4 TYPE 2 DIABETES MELLITUS WITH OTHER CIRCULATORY COMPLICATION, WITH LONG-TERM CURRENT USE OF INSULIN (H): ICD-10-CM

## 2022-11-15 RX ORDER — INSULIN DEGLUDEC 100 U/ML
36 INJECTION, SOLUTION SUBCUTANEOUS DAILY
Qty: 15 ML | Refills: 11 | Status: SHIPPED | OUTPATIENT
Start: 2022-11-15 | End: 2023-01-09

## 2022-11-15 RX ORDER — INSULIN ASPART 100 [IU]/ML
INJECTION, SOLUTION INTRAVENOUS; SUBCUTANEOUS
Qty: 15 ML | Refills: 11 | Status: SHIPPED | OUTPATIENT
Start: 2022-11-15 | End: 2024-05-16

## 2022-12-19 ENCOUNTER — OFFICE VISIT (OUTPATIENT)
Dept: VASCULAR SURGERY | Facility: CLINIC | Age: 57
End: 2022-12-19
Attending: SURGERY
Payer: COMMERCIAL

## 2022-12-19 ENCOUNTER — ANCILLARY PROCEDURE (OUTPATIENT)
Dept: VASCULAR ULTRASOUND | Facility: CLINIC | Age: 57
End: 2022-12-19
Attending: SURGERY
Payer: COMMERCIAL

## 2022-12-19 VITALS
SYSTOLIC BLOOD PRESSURE: 124 MMHG | RESPIRATION RATE: 16 BRPM | TEMPERATURE: 98 F | DIASTOLIC BLOOD PRESSURE: 90 MMHG | HEART RATE: 96 BPM

## 2022-12-19 DIAGNOSIS — I63.232 STENOSIS OF LEFT INTERNAL CAROTID ARTERY WITH CEREBRAL INFARCTION (H): Primary | ICD-10-CM

## 2022-12-19 DIAGNOSIS — I63.232 STENOSIS OF LEFT INTERNAL CAROTID ARTERY WITH CEREBRAL INFARCTION (H): ICD-10-CM

## 2022-12-19 PROCEDURE — G0463 HOSPITAL OUTPT CLINIC VISIT: HCPCS | Mod: 25 | Performed by: PHYSICIAN ASSISTANT

## 2022-12-19 PROCEDURE — 99214 OFFICE O/P EST MOD 30 MIN: CPT | Performed by: PHYSICIAN ASSISTANT

## 2022-12-19 PROCEDURE — 93880 EXTRACRANIAL BILAT STUDY: CPT

## 2022-12-19 PROCEDURE — 93880 EXTRACRANIAL BILAT STUDY: CPT | Mod: 26 | Performed by: SURGERY

## 2022-12-19 ASSESSMENT — PAIN SCALES - GENERAL: PAINLEVEL: NO PAIN (0)

## 2022-12-19 NOTE — PROGRESS NOTES
[facility]        Patient is here for a 6 month follow up  to discuss Carotid stenosis. Patient has no symptoms.  He has neuropathy in feet in hands.     Pt is currently taking Aspirin, Statin and Plavix.      The provider has been notified that the patient has no concerns.     Questions patient would like addressed today are: N/A.    Refills are needed: N/A    Has homecare services and agency name:  Opal Cooper RN

## 2022-12-19 NOTE — PATIENT INSTRUCTIONS
Milana Waters,    Thank you for entrusting your care with us today. After your visit today with DANNY Streeter this is the plan that was discussed at your appointment.    1 year follow up with repeat carotid ultrasound.        I am including additional information on these things and our contact information if you have any questions or concerns.   Please do not hesitate to reach out to us if you felt we did not answer your questions or you are unsure of the treatment plan after your visit today. Our number is [phone].Thank you for trusting us with your care.       Carotid Artery Problems: Stroke    The carotid arteries are large blood vessels that carry blood to the brain. When these arteries are healthy, the brain gets all the oxygen and nutrients it needs to function well. But if the carotid arteries are damaged, this can greatly increase your risk of a stroke. A stroke is a sudden loss of brain function caused by a lack of blood flow and oxygen.    Blood clot blocking carotid artery and emboli breaking off clot. Inset shows damage to brain.      Small pieces of a blood clot called emboli break off and can enter the bloodstream and travel to the brain. Brain tissue is damaged when emboli block arteries in the brain.    How artery damage can lead to a stroke  A healthy carotid artery is smooth on the inside, like a tube. But health problems such as high blood pressure, diabetes, and smoking can damage the inside of the artery wall and make it rough. This lets fatty deposits called plaque build up on the artery wall. Blood clots called emboli may also form on the plaque. If pieces of plaque or emboli break off, they can flow in the blood and get stuck in a small blood vessel in the brain. This blocks the blood flow to a part of the brain, and causes a stroke.      Symptoms of a stroke  Below are common symptoms of a stroke.  Call 911 right away if you have any of these symptoms. Fast medical treatment for a  stroke is vital. The longer you wait to get help, the more damage a stroke can do.    Sudden numbness or weakness of the face, arm, or leg, especially on one side of the body  Sudden confusion or trouble speaking or understanding other people  Sudden trouble seeing in 1 or both eyes  Sudden trouble walking, dizziness, or loss of balance or coordination  Sudden, severe headache with no known cause  Sudden new seizures    Transient ischemic attack (TIA)  A transient ischemic attack (TIA) is a mini-stroke. It s a warning sign that a larger stroke may happen in the near future. A TIA is when an artery to the brain is blocked for a brief time. This will cause symptoms just like those that happen with a stroke. But with a TIA, they last a short period of time, from a few seconds to a few hours. Never ignore any TIA or stroke symptoms.  Call 911 right away .      F.A.S.T.  F.A.S.T. is an easy way to remember the signs of a stroke or TIA. When you see these signs, call 911 fast.    F.A.S.T. stands for:    F is for face drooping. One side of the face is drooping or numb. When the person smiles, the smile is uneven.  A is for arm weakness. One arm is weak or numb. When the person lifts both arms at the same time, one arm may drift downward.  S is for speech difficulty. You may notice slurred speech or trouble speaking. The person can't repeat a simple sentence correctly when asked.  T is for time to call 911. If someone shows any of these symptoms, even if the symptoms go away, call 911 right away. Make note of the time the symptoms first appeared.          2052-2425 [employer]. [address] [place] [id number]. All rights reserved. This information is not intended as a substitute for professional medical care. Always follow your healthcare professional's instructions.

## 2022-12-19 NOTE — PROGRESS NOTES
VASCULAR SURGERY PROGRESS NOTE    LOCATION:  [facility]     Jt Venegas  Medical Record #:  [id number]  YOB: 1965  Age:  57 year old     Date of Service: 12/19/2022    PRIMARY CARE PROVIDER: Kylie Armijo    Reason for visit: Surveillance carotid disease     IMPRESSION: 56 YO male presenting for follow up of carotid artery stenosis. In January of this year, patient present with right arm numbness and was found to have a small left hemispheric stroke. Because of a bed shortage, he was transferred from our facility to [facility] where he underwent work-up and left carotid endarterectomy. Follows with our team for ongoing surveillance. Duplex today shows less than 50% stenosis bilaterally. Patient is completely asymptomatic and compliant with medications.     RECOMMENDATION/RISKS: Continue best medical management with aspirin and statin therapy. Follow up in 1 year with repeat carotid duplex.     HPI:  Jt Venegas is a 57 year old male with past medical history significant for hypertension, hyperlipidemia, type 2 diabetes mellitus, ongoing tobacco abuse, history of CVA, and left carotid artery stenosis status post endarterectomy in February. He was last seen in the vascular clinic 6 months ago and was noted to be doing well on aspirin and statin with no residual narrowing on ultrasound imaging.     Today, Mr. Venegas is doing well. He denies any vision changes, slurred speech, facial asymmetry, unilateral extremity weakness/numbness, or other signs or symptoms of TIA or stroke. He is compliant with his medications but unfortunately continues to smoke. No other concerns.     REVIEW OF SYSTEMS:    A 12 point ROS was reviewed and is negative except for what is listed above in HPI.    PHH:  No past medical history on file.     Past Surgical History:   Procedure Laterality Date     APPENDECTOMY       HERNIA REPAIR       Left CEA       ALLERGIES:   Hydrochlorothiazide    MEDS:    Current Outpatient Medications:      aspirin 81 MG EC tablet, [ASPIRIN 81 MG EC TABLET] Take 81 mg by mouth daily., Disp: , Rfl:      atorvastatin (LIPITOR) 20 MG tablet, Take 1 tablet (20 mg) by mouth At Bedtime, Disp: 90 tablet, Rfl: 4     clopidogrel (PLAVIX) 75 MG tablet, Take 1 tablet (75 mg) by mouth daily, Disp: 90 tablet, Rfl: 4     insulin aspart (NOVOLOG FLEXPEN) 100 UNIT/ML pen, Inject 4 units under the skin 3 times daily before meals as directed + correction scale (max 40 units/day). [employer], Disp: 15 mL, Rfl: 11     Insulin Degludec (TRESIBA) 100 UNIT/ML SOLN, Inject 36 Units Subcutaneous daily [employer] INFO BELOW, Disp: 15 mL, Rfl: 11     insulin glargine (LANTUS PEN) 100 UNIT/ML pen, Inject 36 Units Subcutaneous every morning (before breakfast), Disp: 45 mL, Rfl: 3     insulin lispro (HUMALOG KWIKPEN) 100 UNIT/ML (1 unit dial) KWIKPEN, Inject 4 Units Subcutaneous 3 times daily (before meals) Adjust per insulin sliding scale based on blood sugar. Estimate 40 units per day., Disp: 45 mL, Rfl: 3     lisinopril (ZESTRIL) 10 MG tablet, Take 1 tablet (10 mg) by mouth daily, Disp: 90 tablet, Rfl: 2     metFORMIN (GLUCOPHAGE XR) 500 MG 24 hr tablet, Take 4 tablets (2,000 mg) by mouth daily (with dinner), Disp: 360 tablet, Rfl: 0     atorvastatin (LIPITOR) 40 MG tablet, Take 1 tablet (40 mg) by mouth daily (Patient not taking: Reported on 12/19/2022), Disp: 90 tablet, Rfl: 3     blood-glucose meter Misc, [BLOOD-GLUCOSE METER MISC] Use 1 Device As Directed 2 (two) times a day., Disp: 1 each, Rfl: 0     insulin pen needle (32G X 6 MM) 32G X 6 MM miscellaneous, Use 4 pen needles daily or as directed., Disp: 200 each, Rfl: 1     nicotine (NICODERM CQ) 7 MG/24HR 24 hr patch, Place 1 patch onto the skin every 24 hours (Patient not taking: Reported on 12/19/2022), Disp: 30 patch, Rfl: 11     sildenafiL (VIAGRA) 50 MG tablet, [SILDENAFIL  (VIAGRA) 50 MG TABLET] Take 1 tablet (50 mg total) by mouth as needed for erectile dysfunction., Disp: 20 tablet, Rfl: 11     TRUETRACK TEST strips, [TRUETRACK TEST STRIPS] TEST BLOOD SUGAR TWICE DAILY OR AS DIRECTED., Disp: 200 strip, Rfl: 0    SOCIAL HABITS:    History   Smoking Status     Every Day     Packs/day: 0.50     Types: Cigarettes   Smokeless Tobacco     Never     Social History    Substance and Sexual Activity      Alcohol use: Yes        Comment: one drink every 2 weeks      History   Drug Use Unknown     FAMILY HISTORY:    Family History   Problem Relation Age of Onset     Diabetes Mother      Cancer Mother         lung and brain     Cancer Father         melanoma     PE:  BP (!) 124/90   Pulse 96   Temp 98  F (36.7  C)   Resp 16   Wt Readings from Last 1 Encounters:   04/20/22 98 kg (216 lb)     There is no height or weight on file to calculate BMI.    EXAM:  GENERAL: well-developed 57 year old male who appears his stated age  CARDIAC: normal   CHEST/LUNG: normal respiratory effort   MUSCULOSKELETAL: grossly normal and both lower extremities are intact, no lower extremity edema  NEUROLOGIC: focally intact, alert and oriented x 3  PSYCH: appropriate affect    DIAGNOSTIC STUDIES:     Images:    US Carotid Bilateral  I personally reviewed the images and my interpretation is less than 50% stenosis based on velocities and NASCET criteria.    LABS:      Sodium   Date Value Ref Range Status   02/07/2022 140 136 - 145 mmol/L Final   01/28/2022 139 136 - 145 mmol/L Final   11/09/2020 132 (L) 136 - 145 mmol/L Final     Urea Nitrogen   Date Value Ref Range Status   02/07/2022 14 8 - 22 mg/dL Final   01/28/2022 16 8 - 22 mg/dL Final   11/09/2020 18 8 - 22 mg/dL Final     Hemoglobin   Date Value Ref Range Status   01/28/2022 11.9 (L) 13.3 - 17.7 g/dL Final   01/24/2022 13.0 (L) 13.3 - 17.7 g/dL Final     Platelet Count   Date Value Ref Range Status   01/28/2022 349 150 - 450 10e3/uL Final   01/24/2022 307 150  - 450 10e3/uL Final     INR   Date Value Ref Range Status   01/28/2022 0.98 0.85 - 1.15 Final     30 minutes spent on the day of encounter doing chart review, history and exam, documentation, and further activities as noted.     Andria Streeter PA-C  VASCULAR SURGERY

## 2023-01-08 ENCOUNTER — MYC MEDICAL ADVICE (OUTPATIENT)
Dept: FAMILY MEDICINE | Facility: CLINIC | Age: 58
End: 2023-01-08

## 2023-01-08 DIAGNOSIS — E11.59 TYPE 2 DIABETES MELLITUS WITH OTHER CIRCULATORY COMPLICATION, WITH LONG-TERM CURRENT USE OF INSULIN (H): ICD-10-CM

## 2023-01-08 DIAGNOSIS — Z79.4 TYPE 2 DIABETES MELLITUS WITH OTHER CIRCULATORY COMPLICATION, WITH LONG-TERM CURRENT USE OF INSULIN (H): ICD-10-CM

## 2023-01-17 NOTE — PROGRESS NOTES
[facility]    Outpatient Physical Therapy Discharge Note  Patient: Jt Venegas  : 1965    Beginning/End Dates of Reporting Period:  22 to 3/22/22    Plan:  Changes to therapy plan of care: Pt attended 2 visits in PT and was making slight progress towards goals but then did not return x60 days. See note below for last known status. Will discharge this episode of PT. Thank you for this referral!       22 1100   Signing Clinician's Name / Credentials   Signing clinician's name / credentials Mary Lou Wynn PT, DPT, OCS, CLT   Session Number   Session Number    Progress Note/Recertification   Progress Note Due Date 22   Adult Goals   PT Eval Goals 1;2;3   Goal 1   Goal Description Pt will be able to do 15 sit to stands in 30 seconds to decrease risk of falls and demo improved physical conditioning in 12 weeks.   Goal Progress IMPROVING - able to do 16 reps today   Target Date 22   Goal 2   Goal Description Pt will be able to demo normal strength R UE and LE with MMT for improved ability to performing lifting tasks at workin 12 weeks.   Goal Progress IMPROVING - has been trying some exercises - going okay   Target Date 22   Goal 3   Goal Description Pt will be able to balance x10 seconds B SLS for decreased risk for falls in 12 weeks.    Goal Progress IMPROVING - B LE getting better   Target Date 22   Subjective Report   Subjective Report Pt reports he was having to climb up and down ladders to fix his roof and he feels like his new cholesterol meds might be making his legs feel sore. Pt reports leg pain is rated 8/10 - making it hard to do much with physical activity.     Objective Measures   Objective Measures Objective Measure 1;Objective Measure 2   Objective Measure 1   Objective Measure APTA sit to stand   Details Pt able to do 16 reps today (was 11 last visit -  age/gender norm 15 reps)   Objective Measure 2   Objective Measure SLS   Details R SLS 14 seconds (was 4) and L SLS 10 seconds (was 5)   Treatment Interventions   Interventions Therapeutic Procedure/Exercise;Neuromuscular Re-education   Therapeutic Procedure/exercise   Therapeutic Procedures: strength, endurance, ROM, flexibillity minutes (09746) 12   Skilled Intervention Assessed response to compliance with HEP and amount of activity pt has been doing with climbing ladders to fix his roof. Reviewed current HEP and added upper body/core/trunk strengthening to improve pt's physical conditioning. Added per pt instructions and printed AVS for home.   Patient Response Difficulty but no increase in pain with new exercises   Treatment Detail Performed counter push ups and discussed trying at work bench or variety of surfaces that feel challenging but don't increase leg pain or risk of falls, performed rows with L2 band and gave band for home   Neuromuscular Re-education   Neuromuscular re-ed of mvmt, balance, coord, kinesthetic sense, posture, proprioception minutes (49905) 12   Skilled Intervention Reassessed B SLS and sit to stand balance risk and discussed improvements. Advised pt to continue performing sit to stand at least 3x/week and SLS daily for continued improvement to balance to decrease falls risk   Treatment Detail Performed SLS B LE x5 reps each, performed sit to stand assessment   Education   Learner Patient   Readiness Eager   Method Booklet/handout   Response Verbalizes Understanding   Plan   Home program Sit to stand, shoulder flex and abd, SLS, rows, counter push ups   Plan for next session Assess response to HEP and progress strength and conditioning as able. Reassess shoulder strength and balance.   Comments   Comments Pt demo's nice improvements to sit to stand and SLS balance assessments but continues to struggle with physical deconditioning, upper body strength and SLS B. Pt remains good candidate  for skilled PT services to improve remaining impairments and reach goals.   Total Session Time   Timed Code Treatment Minutes 24   Total Treatment Time (sum of timed and untimed services) 24   Medicare Claim Information   Medical Diagnosis Acute ischemic cerebrovascular accident (CVA) involving left middle cerebral artery territory (H)   PT Diagnosis CVA with R sided weakness with physical deconditioning and abnormal balance

## 2023-01-17 NOTE — ADDENDUM NOTE
Encounter addended by: Mary Lou Wynn, PT on: 1/17/2023 11:58 AM   Actions taken: Clinical Note Signed, Flowsheet accepted, Episode resolved

## 2023-02-04 ENCOUNTER — HEALTH MAINTENANCE LETTER (OUTPATIENT)
Age: 58
End: 2023-02-04

## 2023-04-11 ASSESSMENT — ENCOUNTER SYMPTOMS
JOINT SWELLING: 0
NERVOUS/ANXIOUS: 0
PARESTHESIAS: 0
SORE THROAT: 0
CONSTIPATION: 0
CHILLS: 0
HEARTBURN: 0
HEMATURIA: 0
MYALGIAS: 1
SHORTNESS OF BREATH: 0
NAUSEA: 0
COUGH: 0
ABDOMINAL PAIN: 0
FEVER: 0
DIZZINESS: 0
ARTHRALGIAS: 1
DYSURIA: 0
WEAKNESS: 0
FREQUENCY: 0
DIARRHEA: 0
HEADACHES: 0
HEMATOCHEZIA: 0
EYE PAIN: 0
PALPITATIONS: 0

## 2023-04-12 ENCOUNTER — OFFICE VISIT (OUTPATIENT)
Dept: FAMILY MEDICINE | Facility: CLINIC | Age: 58
End: 2023-04-12
Payer: COMMERCIAL

## 2023-04-12 VITALS
DIASTOLIC BLOOD PRESSURE: 74 MMHG | TEMPERATURE: 97.6 F | RESPIRATION RATE: 20 BRPM | OXYGEN SATURATION: 98 % | SYSTOLIC BLOOD PRESSURE: 148 MMHG | HEART RATE: 98 BPM | WEIGHT: 231.1 LBS | BODY MASS INDEX: 34.13 KG/M2

## 2023-04-12 DIAGNOSIS — E11.59 TYPE 2 DIABETES MELLITUS WITH OTHER CIRCULATORY COMPLICATION, WITH LONG-TERM CURRENT USE OF INSULIN (H): ICD-10-CM

## 2023-04-12 DIAGNOSIS — F17.200 TOBACCO DEPENDENCE SYNDROME: ICD-10-CM

## 2023-04-12 DIAGNOSIS — E78.5 HYPERLIPIDEMIA, UNSPECIFIED HYPERLIPIDEMIA TYPE: ICD-10-CM

## 2023-04-12 DIAGNOSIS — Z11.59 NEED FOR HEPATITIS C SCREENING TEST: ICD-10-CM

## 2023-04-12 DIAGNOSIS — H93.13 TINNITUS, BILATERAL: ICD-10-CM

## 2023-04-12 DIAGNOSIS — S86.899A ANTERIOR SHIN SPLINTS: ICD-10-CM

## 2023-04-12 DIAGNOSIS — K21.9 GASTROESOPHAGEAL REFLUX DISEASE WITHOUT ESOPHAGITIS: ICD-10-CM

## 2023-04-12 DIAGNOSIS — H90.3 SENSORINEURAL HEARING LOSS, BILATERAL: ICD-10-CM

## 2023-04-12 DIAGNOSIS — I10 ESSENTIAL HYPERTENSION: ICD-10-CM

## 2023-04-12 DIAGNOSIS — Z12.11 SCREEN FOR COLON CANCER: ICD-10-CM

## 2023-04-12 DIAGNOSIS — Z11.4 SCREENING FOR HIV (HUMAN IMMUNODEFICIENCY VIRUS): ICD-10-CM

## 2023-04-12 DIAGNOSIS — I67.9 CEREBROVASCULAR DISEASE: ICD-10-CM

## 2023-04-12 DIAGNOSIS — Z00.00 ROUTINE PHYSICAL EXAMINATION: Primary | ICD-10-CM

## 2023-04-12 DIAGNOSIS — Z12.5 SCREENING FOR PROSTATE CANCER: ICD-10-CM

## 2023-04-12 DIAGNOSIS — Z79.4 TYPE 2 DIABETES MELLITUS WITH OTHER CIRCULATORY COMPLICATION, WITH LONG-TERM CURRENT USE OF INSULIN (H): ICD-10-CM

## 2023-04-12 LAB
ALBUMIN SERPL BCG-MCNC: 4.2 G/DL (ref 3.5–5.2)
ALP SERPL-CCNC: 88 U/L (ref 40–129)
ALT SERPL W P-5'-P-CCNC: 29 U/L (ref 10–50)
ANION GAP SERPL CALCULATED.3IONS-SCNC: 12 MMOL/L (ref 7–15)
AST SERPL W P-5'-P-CCNC: 34 U/L (ref 10–50)
BILIRUB SERPL-MCNC: 0.9 MG/DL
BUN SERPL-MCNC: 22.1 MG/DL (ref 6–20)
CALCIUM SERPL-MCNC: 9.7 MG/DL (ref 8.6–10)
CHLORIDE SERPL-SCNC: 105 MMOL/L (ref 98–107)
CHOLEST SERPL-MCNC: 180 MG/DL
CREAT SERPL-MCNC: 1.4 MG/DL (ref 0.67–1.17)
CREAT UR-MCNC: 183 MG/DL
DEPRECATED HCO3 PLAS-SCNC: 23 MMOL/L (ref 22–29)
GFR SERPL CREATININE-BSD FRML MDRD: 58 ML/MIN/1.73M2
GLUCOSE SERPL-MCNC: 235 MG/DL (ref 70–99)
HBA1C MFR BLD: 7.3 % (ref 0–5.6)
HDLC SERPL-MCNC: 42 MG/DL
LDLC SERPL CALC-MCNC: 113 MG/DL
MICROALBUMIN UR-MCNC: 260 MG/L
MICROALBUMIN/CREAT UR: 142.08 MG/G CR (ref 0–17)
NONHDLC SERPL-MCNC: 138 MG/DL
POTASSIUM SERPL-SCNC: 5.3 MMOL/L (ref 3.4–5.3)
PROT SERPL-MCNC: 7.1 G/DL (ref 6.4–8.3)
PSA SERPL DL<=0.01 NG/ML-MCNC: 1.57 NG/ML (ref 0–3.5)
SODIUM SERPL-SCNC: 140 MMOL/L (ref 136–145)
TRIGL SERPL-MCNC: 124 MG/DL

## 2023-04-12 PROCEDURE — G0103 PSA SCREENING: HCPCS | Performed by: FAMILY MEDICINE

## 2023-04-12 PROCEDURE — 36415 COLL VENOUS BLD VENIPUNCTURE: CPT | Performed by: FAMILY MEDICINE

## 2023-04-12 PROCEDURE — 99214 OFFICE O/P EST MOD 30 MIN: CPT | Mod: 25 | Performed by: FAMILY MEDICINE

## 2023-04-12 PROCEDURE — 83036 HEMOGLOBIN GLYCOSYLATED A1C: CPT | Performed by: FAMILY MEDICINE

## 2023-04-12 PROCEDURE — 90471 IMMUNIZATION ADMIN: CPT | Performed by: FAMILY MEDICINE

## 2023-04-12 PROCEDURE — 82570 ASSAY OF URINE CREATININE: CPT | Performed by: FAMILY MEDICINE

## 2023-04-12 PROCEDURE — 80053 COMPREHEN METABOLIC PANEL: CPT | Performed by: FAMILY MEDICINE

## 2023-04-12 PROCEDURE — 99396 PREV VISIT EST AGE 40-64: CPT | Mod: 25 | Performed by: FAMILY MEDICINE

## 2023-04-12 PROCEDURE — 82043 UR ALBUMIN QUANTITATIVE: CPT | Performed by: FAMILY MEDICINE

## 2023-04-12 PROCEDURE — 90682 RIV4 VACC RECOMBINANT DNA IM: CPT | Performed by: FAMILY MEDICINE

## 2023-04-12 PROCEDURE — 80061 LIPID PANEL: CPT | Performed by: FAMILY MEDICINE

## 2023-04-12 RX ORDER — LISINOPRIL 20 MG/1
20 TABLET ORAL DAILY
Qty: 90 TABLET | Refills: 1 | Status: SHIPPED | OUTPATIENT
Start: 2023-04-12 | End: 2023-12-28

## 2023-04-12 ASSESSMENT — PATIENT HEALTH QUESTIONNAIRE - PHQ9
SUM OF ALL RESPONSES TO PHQ QUESTIONS 1-9: 4
SUM OF ALL RESPONSES TO PHQ QUESTIONS 1-9: 4
10. IF YOU CHECKED OFF ANY PROBLEMS, HOW DIFFICULT HAVE THESE PROBLEMS MADE IT FOR YOU TO DO YOUR WORK, TAKE CARE OF THINGS AT HOME, OR GET ALONG WITH OTHER PEOPLE: NOT DIFFICULT AT ALL

## 2023-04-12 ASSESSMENT — PAIN SCALES - GENERAL: PAINLEVEL: MODERATE PAIN (5)

## 2023-04-12 NOTE — PATIENT INSTRUCTIONS
I recommend that you quit smoking.  We are ready to help you when you are ready, just let us know.  Consider reaching out to [place] [employer] for resources at no cost.    Please schedule your colonoscopy.    Please schedule your diabetes eye exam, ensure this is done once yearly and have them send the results to us.    Flu vaccine is administered today.  I also recommend consideration of the pneumonia shot, Shingrix, the COVID-vaccine series, and hepatitis B vaccine series.    Please schedule an appointment for a nurse only visit to recheck your blood pressure in 3 weeks.  Please also schedule visit with diabetes education.  I placed a referral to dermatology, you may call them to schedule a visit.  [facility] will call you to schedule colonoscopy.      Preventive Health Recommendations  Male Ages 50 - 64    Yearly exam:             See your health care provider every year in order to  o   Review health changes.   o   Discuss preventive care.    o   Review your medicines if your doctor has prescribed any.   Have a cholesterol test every 5 years, or more frequently if you are at risk for high cholesterol/heart disease.   Have a diabetes test (fasting glucose) every three years. If you are at risk for diabetes, you should have this test more often.   Have a colonoscopy at age 50, or have a yearly FIT test (stool test). These exams will check for colon cancer.    Talk with your health care provider about whether or not a prostate cancer screening test (PSA) is right for you.  You should be tested each year for STDs (sexually transmitted diseases), if you re at risk.     Shots: Get a flu shot each year. Get a tetanus shot every 10 years.     Nutrition:  Eat at least 5 servings of fruits and vegetables daily.   Eat whole-grain bread, whole-wheat pasta and brown rice instead of white grains and rice.   Get adequate Calcium and Vitamin D.     Lifestyle  Exercise for at least 150 minutes a week (30 minutes a day, 5  days a week). This will help you control your weight and prevent disease.   Limit alcohol to one drink per day.   No smoking.   Wear sunscreen to prevent skin cancer.   See your dentist every six months for an exam and cleaning.   See your eye doctor every 1 to 2 years.

## 2023-04-12 NOTE — PROGRESS NOTES
Assessment/Plan:     Routine physical examination  Encouraged healthy lifestyle habits including regular exercise, healthy eating habits, and adequate calcium and vitamin D intake.  Seasonal influenza vaccine administered today.  Advised Pneumovax, he declines.  Advised COVID vaccine series, he declines.  Advised Shingrix and hepatitis B, he will consider but declines today.  He is agreeable to PSA for prostate cancer screening.  He prefers colonoscopy for colon cancer screening, this is ordered today.  Discussed lung cancer screening, he declines.  Advised tobacco cessation, he declines resources.  Information provided regarding advance healthcare directives.  - Adult Dermatology Referral; Future    Type 2 diabetes mellitus with other circulatory complication, with long-term current use of insulin (H)  Congratulated him on significant improvement in his A1c.  Advised efforts at healthy lifestyle habits.  Evening sugars are running high, I do not have a sense of where he is at with his fasting sugars.  For that reason do not adjust his Lantus today.  He does not necessarily use a specified system in determining NovoLog dosage which also makes it difficult to adjust.  Advise follow-up with diabetes education to refine his diabetes monitoring and insulin regimen.  He may benefit from a continuous glucose monitor or a whitney 2 sensor.  Continue metformin at current dose.  We will check urine microalbumin today.  He is advised to schedule follow-up diabetes eye exam.  Follow-up in 3 months with me.  - Lipid panel reflex to direct LDL Fasting; Future  - Hemoglobin A1c; Future  - Comprehensive metabolic panel; Future  - Albumin Random Urine Quantitative with Creat Ratio; Future  - Comprehensive metabolic panel  - Hemoglobin A1c  - Albumin Random Urine Quantitative with Creat Ratio  - insulin glargine (LANTUS PEN) 100 UNIT/ML pen; Inject 36 Units Subcutaneous every morning (before breakfast)  - [facility] Adult Diabetes Educator  Referral; Future    Essential hypertension  Encourage efforts at healthy lifestyle habits.  Inadequate control.  Increase lisinopril to 20 mg daily.  Follow-up visit in 3 to 4 weeks with clinical assistant to reassess control  - Comprehensive metabolic panel; Future  - lisinopril (ZESTRIL) 20 MG tablet; Take 1 tablet (20 mg) by mouth daily    Hyperlipidemia, unspecified hyperlipidemia type  Encourage efforts at healthy lifestyle habits.  Continue atorvastatin at current dose which he is tolerating.  Check fasting lipids  - Lipid panel reflex to direct LDL Fasting; Future    Cerebrovascular disease  He continues to work with vascular clinic.  Symptoms well managed.  We will check fasting lipids.  Continue risk factor modification.  Continue aspirin and clopidogrel.  Advised tobacco cessation.  - Lipid panel reflex to direct LDL Fasting; Future    Tobacco dependence syndrome  Strongly advised cessation.  He declines assistance.    Gastroesophageal reflux disease without esophagitis  Controlled currently stable.  Advised on continued efforts at healthy lifestyle habits, avoidance of tobacco use.  Continue over-the-counter medications as needed.    Anterior shin splints  Reviewed nature of condition.  Advised stretching, supportive footwear, avoidance of prolonged standing or walking on firm surfaces.  Fight with persistence.  Could consider physical therapy.    Tinnitus, bilateral  Sensorineural hearing loss, bilateral  Previously assessed by ENT, stable.    Screening for HIV (human immunodeficiency virus)  Need for hepatitis C screening test  He declines screening today.    Screen for colon cancer  Discussed options for colon cancer screening, he is agreeable to colonoscopy.  Referral is placed.  - Colonoscopy Screening  Referral; Future    Screening for prostate cancer  Reviewed risk versus benefits of screening, he is interested in prostate cancer screening with PSA.  - Prostate Specific Antigen Screen;  Future  - Prostate Specific Antigen Screen     Patient Instructions   I recommend that you quit smoking.  We are ready to help you when you are ready, just let us know.  Consider reaching out to [place] [facility] for resources at no cost.    Please schedule your colonoscopy.    Please schedule your diabetes eye exam, ensure this is done once yearly and have them send the results to us.    Flu vaccine is administered today.  I also recommend consideration of the pneumonia shot, Shingrix, the COVID-vaccine series, and hepatitis B vaccine series.    Please schedule an appointment for a nurse only visit to recheck your blood pressure in 3 weeks.  Please also schedule visit with diabetes education.  I placed a referral to dermatology, you may call them to schedule a visit.  [facility] will call you to schedule colonoscopy.      Preventive Health Recommendations  Male Ages 50 - 64    Yearly exam:             See your health care provider every year in order to  o   Review health changes.   o   Discuss preventive care.    o   Review your medicines if your doctor has prescribed any.     Have a cholesterol test every 5 years, or more frequently if you are at risk for high cholesterol/heart disease.     Have a diabetes test (fasting glucose) every three years. If you are at risk for diabetes, you should have this test more often.     Have a colonoscopy at age 50, or have a yearly FIT test (stool test). These exams will check for colon cancer.      Talk with your health care provider about whether or not a prostate cancer screening test (PSA) is right for you.    You should be tested each year for STDs (sexually transmitted diseases), if you re at risk.     Shots: Get a flu shot each year. Get a tetanus shot every 10 years.     Nutrition:    Eat at least 5 servings of fruits and vegetables daily.     Eat whole-grain bread, whole-wheat pasta and brown rice instead of white grains and rice.     Get adequate Calcium and  Vitamin D.     Lifestyle    Exercise for at least 150 minutes a week (30 minutes a day, 5 days a week). This will help you control your weight and prevent disease.     Limit alcohol to one drink per day.     No smoking.     Wear sunscreen to prevent skin cancer.     See your dentist every six months for an exam and cleaning.     See your eye doctor every 1 to 2 years.         No follow-ups on file.         Subjective:     Jt Venegas is a 57 year old male who presents for an annual exam.     Overall doing okay.  Taking Lantus 36 units and then taking 80 to 20 units of NovoLog with meals.  Denies any low sugars.  Checks his sugars in the evening at bedtime and they are usually around 200, occasionally as high as 280.  Tolerating metformin well.  He is not currently doing any dedicated physical activity but has been more active at work, resumed working about a week ago.  Has had pain in his anterior shins since then especially with dorsiflexion.  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  Tolerating lisinopril well.  Denies lightheadedness, dizziness, headaches, chest pain, dyspnea, or swelling.  Does not check his blood pressures at home.  Remains on atorvastatin 20 mg daily and is tolerating this well, did not tolerate 40 mg in the past.  Previous history of left MCA ischemic vascular event, status post left carotid endarterectomy.  Overall function has been good.  Remains on aspirin and clopidogrel.  Has had follow-up with vascular.  Continues to smoke half pack of cigarettes daily, has done so for 38 years, estimate about 20-pack-year history of smoking, he is not interested in lung cancer screening.  Denies any urine symptoms suggestive of BPH.  Increased frequency of reflux of late, responds readily to [place] when he needs to take it.  He has had some difficulties with tinnitus and hearing, saw ENT, diagnosis sensorineural hearing loss but is not interested in  intervention.  He is agreeable to flu shot today, declines all other vaccinations.  He is interested in colonoscopy for colon cancer screening.  He reports diabetes eye exam May 2020 at [facility].    Healthy Habits:     Getting at least 3 servings of Calcium per day:  Yes    Bi-annual eye exam:  Yes    Dental care twice a year:  Yes    Sleep apnea or symptoms of sleep apnea:  None    Diet:  Diabetic    Frequency of exercise:  2-3 days/week    Duration of exercise:  45-60 minutes    Taking medications regularly:  Yes    PHQ-2 Total Score: 2       Healthy Habits:   Healthy Diet: Yes  Regular Exercise: No  Sunscreen Use: Yes  Dental Visits Regularly: Yes, unfortunately lost teeth due to injury, pursuing dentures currently  Seat Belt: Yes    Health Maintenance reviewed:  Lipid Profile: Due today  Glucose Screen: N/A, known diabetes  Colonoscopy: No previous      Immunization History   Administered Date(s) Administered     Influenza Vaccine 50-64 or 18-64 w/egg allergy (Flublok) 04/12/2023     Influenza Vaccine >6 months (Alfuria,Fluzone) 02/02/2022     Pneumococcal 23 valent 06/13/2016     TDAP (Adacel,Boostrix) 04/04/2019     Td (Adult), Adsorbed 05/25/1999     Immunization status: Eligible for flu shot, first COVID-vaccine, Shingrix, PCV 20, hepatitis B    Current Outpatient Medications   Medication Sig Dispense Refill     atorvastatin (LIPITOR) 20 MG tablet Take 1 tablet (20 mg) by mouth At Bedtime 90 tablet 4     blood-glucose meter Misc [BLOOD-GLUCOSE METER MISC] Use 1 Device As Directed 2 (two) times a day. 1 each 0     clopidogrel (PLAVIX) 75 MG tablet Take 1 tablet (75 mg) by mouth daily 90 tablet 4     insulin aspart (NOVOLOG FLEXPEN) 100 UNIT/ML pen Inject 4 units under the skin 3 times daily before meals as directed + correction scale (max 40 units/day). [facility] [employer] [facility] 15 mL 11     insulin glargine (LANTUS PEN) 100 UNIT/ML pen Inject 36 Units Subcutaneous every morning (before breakfast)  45 mL 3     lisinopril (ZESTRIL) 20 MG tablet Take 1 tablet (20 mg) by mouth daily 90 tablet 1     metFORMIN (GLUCOPHAGE XR) 500 MG 24 hr tablet Take 4 tablets (2,000 mg) by mouth daily (with dinner) 360 tablet 0     sildenafiL (VIAGRA) 50 MG tablet [SILDENAFIL (VIAGRA) 50 MG TABLET] Take 1 tablet (50 mg total) by mouth as needed for erectile dysfunction. 20 tablet 11     aspirin 81 MG EC tablet [ASPIRIN 81 MG EC TABLET] Take 81 mg by mouth daily.       No past medical history on file.  Past Surgical History:   Procedure Laterality Date     APPENDECTOMY       HERNIA REPAIR       Left CEA       Hydrochlorothiazide  Family History   Problem Relation Age of Onset     Diabetes Mother      Cancer Mother         lung and brain     Cancer Father         melanoma     Social History     Socioeconomic History     Marital status:      Spouse name: Not on file     Number of children: Not on file     Years of education: Not on file     Highest education level: Not on file   Occupational History     Not on file   Tobacco Use     Smoking status: Every Day     Packs/day: 0.50     Types: Cigarettes     Smokeless tobacco: Never   Vaping Use     Vaping status: Not on file   Substance and Sexual Activity     Alcohol use: Yes     Comment: one drink every 2 weeks     Drug use: Never     Sexual activity: Not on file   Other Topics Concern     Not on file   Social History Narrative     Not on file     Social Determinants of Health     Financial Resource Strain: Not on file   Food Insecurity: Not on file   Transportation Needs: Not on file   Physical Activity: Not on file   Stress: Not on file   Social Connections: Not on file   Intimate Partner Violence: Not on file   Housing Stability: Not on file       Review of Systems  General:  Denies problem  Eyes: Denies problem  Ears/Nose/Throat: Denies problem  Cardiovascular: Denies problem  Respiratory:  Denies problem  Gastrointestinal:  Denies problem   Genitourinary: Denies  problem  Musculoskeletal:  Denies problem  Skin: Denies problem  Neurologic: Denies problem  Psychiatric: Denies problem  Endocrine: Denies problem  Heme/Lymphatic: Denies problem   Allergic/Immunologic: Denies problem           Objective:        Vitals:    04/12/23 0752 04/12/23 0755 04/12/23 0903   BP: (!) 168/102 (!) 161/88 (!) 148/74   Pulse: 98     Resp: 20     Temp: 97.6  F (36.4  C)     TempSrc: Oral     SpO2: 98%     Weight: 104.8 kg (231 lb 1.6 oz)     PainSc: Moderate Pain (5)     PainLoc: Lower Leg       Body mass index is 34.13 kg/m .    Physical Exam:  General Appearance: Alert, pleasant, appears stated age  Head: Normocephalic, without obvious abnormality  Eyes: PERRL, conjunctiva/corneas clear, EOM's intact  Ears: Normal TM's and external ear canals, both ears  Nose: Nares normal, septum midline,mucosa normal, no drainage  Throat: Lips, mucosa, and tongue normal; teeth and gums normal; oropharynx is clear  Neck: Supple,without lymphadenopathy or thyromegally  Lungs: Clear to auscultation bilaterally, respirations unlabored  Heart: Regular rate and rhythm, no murmur   Abdomen: Soft, non-tender, no masses, no organomegaly  Genitourinary: Testes of normal size without masses, no inguinal hernias  Extremities: Extremities with strong and symmetric pulses, no cyanosis or edema  Skin: Skin color, texture normal, no rashes or lesions  Neurologic: Normal   Diabetic foot exam: normal DP and PT pulses, no trophic changes or ulcerative lesions and normal sensory exam    Office Visit on 04/12/2023   Component Date Value Ref Range Status     Hemoglobin A1C 04/12/2023 7.3 (H)  0.0 - 5.6 % Final      Answers for HPI/ROS submitted by the patient on 4/12/2023  If you checked off any problems, how difficult have these problems made it for you to do your work, take care of things at home, or get along with other people?: Not difficult at all  PHQ9 TOTAL SCORE: 4  abdominal pain: No  Blood in stool: No  Blood in urine:  No  chest pain: No  chills: No  congestion: No  constipation: No  cough: No  diarrhea: No  dizziness: No  ear pain: No  eye pain: No  nervous/anxious: No  fever: No  frequency: No  genital sores: No  headaches: No  hearing loss: Yes  heartburn: No  arthralgias: Yes  joint swelling: No  peripheral edema: Yes  mood changes: No  myalgias: Yes  nausea: No  dysuria: No  palpitations: No  Skin sensation changes: No  sore throat: No  urgency: No  rash: No  shortness of breath: No  visual disturbance: No  weakness: No  impotence: No  penile discharge: No                 This note has been dictated using voice recognition software. Any grammatical or context distortions are unintentional and inherent to the the software.

## 2023-04-12 NOTE — PROGRESS NOTES
Prior to immunization administration, verified patients identity using patient s name and date of birth. Please see Immunization Activity for additional information.     Screening Questionnaire for Adult Immunization    Are you sick today?   No   Do you have allergies to medications, food, a vaccine component or latex?   No   Have you ever had a serious reaction after receiving a vaccination?   No   Do you have a long-term health problem with heart, lung, kidney, or metabolic disease (e.g., diabetes), asthma, a blood disorder, no spleen, complement component deficiency, a cochlear implant, or a spinal fluid leak?  Are you on long-term aspirin therapy?   Yes   Do you have cancer, leukemia, HIV/AIDS, or any other immune system problem?   No   Do you have a parent, brother, or sister with an immune system problem?   No   In the past 3 months, have you taken medications that affect  your immune system, such as prednisone, other steroids, or anticancer drugs; drugs for the treatment of rheumatoid arthritis, Crohn s disease, or psoriasis; or have you had radiation treatments?   No   Have you had a seizure, or a brain or other nervous system problem?   No   During the past year, have you received a transfusion of blood or blood    products, or been given immune (gamma) globulin or antiviral drug?   No   For women: Are you pregnant or is there a chance you could become       pregnant during the next month?   No   Have you received any vaccinations in the past 4 weeks?   No     Immunization questionnaire was positive for at least one answer.  Notified Kylie Armijo MD.      Injection of flublok given by Shabana Alexander LPN. Patient instructed to remain in clinic for 15 minutes afterwards, and to report any adverse reactions.     Screening performed by Trudy Moore CNA on 4/12/2023 at 8:43 AM.

## 2023-05-01 DIAGNOSIS — I63.512 ACUTE ISCHEMIC CEREBROVASCULAR ACCIDENT (CVA) INVOLVING LEFT MIDDLE CEREBRAL ARTERY TERRITORY (H): ICD-10-CM

## 2023-05-02 RX ORDER — CLOPIDOGREL BISULFATE 75 MG/1
75 TABLET ORAL DAILY
Qty: 90 TABLET | Refills: 4 | Status: SHIPPED | OUTPATIENT
Start: 2023-05-02 | End: 2024-05-16

## 2023-05-02 NOTE — TELEPHONE ENCOUNTER
"Routing refill request to provider for review/approval because:  Labs not current:  Hgb, platelets    Last Written Prescription Date:  3/4/22  Last Fill Quantity: 90,  # refills: 4   Last office visit provider:  4/12/23     Requested Prescriptions   Pending Prescriptions Disp Refills     clopidogrel (PLAVIX) 75 MG tablet 90 tablet 4     Sig: Take 1 tablet (75 mg) by mouth daily       Plavix Failed - 5/1/2023 12:09 PM        Failed - Normal HGB on file in past 12 months     Recent Labs   Lab Test 01/28/22  0101   HGB 11.9*               Failed - Normal Platelets on file in past 12 months     Recent Labs   Lab Test 01/28/22  0101                  Passed - No active PPI on record unless is Protonix        Passed - Recent (12 mo) or future (30 days) visit within the authorizing provider's specialty     Patient has had an office visit with the authorizing provider or a provider within the authorizing providers department within the previous 12 mos or has a future within next 30 days. See \"Patient Info\" tab in inbasket, or \"Choose Columns\" in Meds & Orders section of the refill encounter.              Passed - Medication is active on med list        Passed - Patient is age 18 or older             Natasha Mcclellan RN 05/02/23 1:04 AM  "

## 2023-06-22 ENCOUNTER — TRANSFERRED RECORDS (OUTPATIENT)
Dept: HEALTH INFORMATION MANAGEMENT | Facility: CLINIC | Age: 58
End: 2023-06-22
Payer: COMMERCIAL

## 2023-06-25 DIAGNOSIS — E78.5 HYPERLIPIDEMIA, UNSPECIFIED HYPERLIPIDEMIA TYPE: ICD-10-CM

## 2023-06-25 DIAGNOSIS — E11.9 TYPE 2 DIABETES MELLITUS WITHOUT COMPLICATION, WITHOUT LONG-TERM CURRENT USE OF INSULIN (H): ICD-10-CM

## 2023-06-26 RX ORDER — ATORVASTATIN CALCIUM 20 MG/1
20 TABLET, FILM COATED ORAL AT BEDTIME
Qty: 90 TABLET | Refills: 3 | Status: SHIPPED | OUTPATIENT
Start: 2023-06-26 | End: 2024-06-08

## 2023-06-26 RX ORDER — METFORMIN HCL 500 MG
TABLET, EXTENDED RELEASE 24 HR ORAL
Qty: 360 TABLET | Refills: 3 | Status: SHIPPED | OUTPATIENT
Start: 2023-06-26 | End: 2024-06-23

## 2023-06-26 NOTE — TELEPHONE ENCOUNTER
"Routing refill request to provider for review/approval because:  Drug not on the [facility] refill protocol     Last Written Prescription Date:  4/20/2022  Last Fill Quantity: 90,  # refills: 4   Last office visit provider:  4/12/2023     Requested Prescriptions   Pending Prescriptions Disp Refills     atorvastatin (LIPITOR) 20 MG tablet 90 tablet 4     Sig: Take 1 tablet (20 mg) by mouth At Bedtime       There is no refill protocol information for this order       A break in medicationl   Last Written Prescription Date:  9/9/2022  Last Fill Quantity: 360,  # refills: 0   Last office visit provider:  4/12/2023    metFORMIN (GLUCOPHAGE XR) 500 MG 24 hr tablet [Pharmacy Med Name: METFORMIN ER 500MG TAB] 360 tablet 0     Sig: TAKE FOUR TABLETS BY MOUTH EVERY DAY WITH DINNER       Biguanide Agents Passed - 6/25/2023 12:21 PM        Passed - Patient is age 10 or older        Passed - Patient has documented A1c within the specified period of time.     If HgbA1C is 8 or greater, it needs to be on file within the past 3 months.  If less than 8, must be on file within the past 6 months.     Recent Labs   Lab Test 04/12/23  0732   A1C 7.3*             Passed - Patient's CR is NOT>1.4 OR Patient's EGFR is NOT<45 within past 12 mos.     Recent Labs   Lab Test 04/12/23  0732 01/28/22  0101 11/09/20  1729   GFRESTIMATED 58*   < > 51*   GFRESTBLACK  --   --  >60    < > = values in this interval not displayed.       Recent Labs   Lab Test 04/12/23 0732   CR 1.40*             Passed - Patient does NOT have a diagnosis of CHF.        Passed - Medication is active on med list        Passed - Recent (6 mo) or future (30 days) visit within the authorizing provider's specialty     Patient had office visit in the last 6 months or has a visit in the next 30 days with authorizing provider or within the authorizing provider's specialty.  See \"Patient Info\" tab in inbasket, or \"Choose Columns\" in Meds & Orders section of the refill encounter.     "         Sobeida Rosenberg RN 06/26/23 12:55 PM

## 2023-08-05 ENCOUNTER — HEALTH MAINTENANCE LETTER (OUTPATIENT)
Age: 58
End: 2023-08-05

## 2023-08-21 ENCOUNTER — MYC MEDICAL ADVICE (OUTPATIENT)
Dept: FAMILY MEDICINE | Facility: CLINIC | Age: 58
End: 2023-08-21
Payer: COMMERCIAL

## 2023-08-21 DIAGNOSIS — F32.1 MAJOR DEPRESSIVE DISORDER, SINGLE EPISODE, MODERATE (H): ICD-10-CM

## 2023-08-22 RX ORDER — BUPROPION HYDROCHLORIDE 150 MG/1
150 TABLET ORAL EVERY MORNING
Qty: 90 TABLET | Refills: 1 | Status: SHIPPED | OUTPATIENT
Start: 2023-08-22 | End: 2024-05-16

## 2023-12-13 ENCOUNTER — NURSE TRIAGE (OUTPATIENT)
Dept: FAMILY MEDICINE | Facility: CLINIC | Age: 58
End: 2023-12-13

## 2023-12-13 NOTE — TELEPHONE ENCOUNTER
"Nurse Triage SBAR    Is this a 2nd Level Triage? NO    Situation:   \" Patient is calling and stating that he tested positive on 12/13/2023 and is asking for Paxlovid. Please advise and call patient back with up\"    Background:   Age 58  DM 2  HTN  Tobacco dependence syndrome    GFR Estimate   Date Value Ref Range Status   04/12/2023 58 (L) >60 mL/min/1.73m2 Final     Comment:     eGFR calculated using 2021 CKD-EPI equation.   02/07/2022 69 >60 mL/min/1.73m2 Final     Comment:     Effective December 21, 2021 eGFRcr in adults is calculated using the 2021 CKD-EPI creatinine equation which includes age and gender (Zulay et al., NE, DOI: 10.1056/QUYRio9070830)   01/28/2022 62 >60 mL/min/1.73m2 Final     Comment:     Effective December 21, 2021 eGFRcr in adults is calculated using the 2021 CKD-EPI creatinine equation which includes age and gender (Zulay et al., NE, DOI: 10.1056/XREZwj4728763)   11/09/2020 51 (L) >60 mL/min/1.73m2 Final   04/04/2019 48 (L) >60 mL/min/1.73m2 Final   01/03/2018 >60 >60 mL/min/1.73m2 Final     GFR Estimate If Black   Date Value Ref Range Status   11/09/2020 >60 >60 mL/min/1.73m2 Final   04/04/2019 59 (L) >60 mL/min/1.73m2 Final   01/03/2018 >60 >60 mL/min/1.73m2 Final      Lab Results   Component Value Date    AST 34 04/12/2023     Lab Results   Component Value Date    ALT 29 04/12/2023      Lab Results   Component Value Date    ALKPHOS 88 04/12/2023     Lab Results   Component Value Date    BILITOTAL 0.9 04/12/2023    BILITOTAL 0.5 01/28/2022    BILITOTAL 0.6 11/09/2020    BILITOTAL 0.5 04/04/2019        Plavix- on Med List #2- needs to see provider virtually      Assessment:   Fever or chills- 100.4 last night  Cough- yes, dry cough  Shortness of breath or difficulty breathing- no  Chest pain- yes  Fatigue- yes  Muscle or body aches- yes  Headache - yes  New loss of taste or smell-yes to loss of taste  Sore throat- no  Congestion or runny nose- runny nose  Nausea or vomiting- " no  Diarrhea- no    Sx started on Tuesday      Protocol Recommended Disposition:   Discuss With PCP And Callback By Nurse Within 1 Hour    Recommendation:   Care advice given. Initiated RN COVID-19 TX Protocol, but unable to complete d/t pt taking Plavix. Assisted with scheduling Telephone visit with a provider for today.     Assisted with scheduling Telephone Visit for pt.    Does the patient meet one of the following criteria for ADS visit consideration? 16+ years old, with an [facility] PCP     TIP  Providers, please consider if this condition is appropriate for management at one of our Acute and Diagnostic Services sites.     If patient is a good candidate, please use dotphrase <dot>triageresponse and select Refer to ADS to document.    Reason for Disposition   HIGH RISK patient (e.g., weak immune system, age > 64 years, obesity with BMI of 30 or higher, pregnant, chronic lung disease or other chronic medical condition) and COVID symptoms (e.g., cough, fever)  (Exceptions: Already seen by doctor or NP/PA and no new or worsening symptoms.)    Additional Information   Negative: SEVERE difficulty breathing (e.g., struggling for each breath, speaks in single words)   Negative: Difficult to awaken or acting confused (e.g., disoriented, slurred speech)   Negative: Bluish (or gray) lips or face now   Negative: Shock suspected (e.g., cold/pale/clammy skin, too weak to stand, low BP, rapid pulse)   Negative: Sounds like a life-threatening emergency to the triager   Negative: SEVERE or constant chest pain or pressure  (Exception: Mild central chest pain, present only when coughing.)   Negative: MODERATE difficulty breathing (e.g., speaks in phrases, SOB even at rest, pulse 100-120)   Negative: Headache and stiff neck (can't touch chin to chest)   Negative: Oxygen level (e.g., pulse oximetry) 90% or lower   Negative: Diagnosed or suspected COVID-19 and symptoms lasting 3 or more weeks   Negative: COVID-19 exposure and no  symptoms   Negative: COVID-19 vaccine reaction suspected (e.g., fever, headache, muscle aches) occurring 1 to 3 days after getting vaccine   Negative: COVID-19 vaccine, questions about   Negative: Lives with someone known to have influenza (flu test positive) and flu-like symptoms (e.g., cough, runny nose, sore throat, SOB; with or without fever)   Negative: Possible COVID-19 symptoms and triager concerned about severity of symptoms or other causes   Negative: COVID-19 and breastfeeding, questions about   Negative: Chest pain or pressure  (Exception: MILD central chest pain, present only when coughing.)   Negative: Drinking very little and dehydration suspected (e.g., no urine > 12 hours, very dry mouth, very lightheaded)   Negative: Patient sounds very sick or weak to the triager   Negative: MILD difficulty breathing (e.g., minimal/no SOB at rest, SOB with walking, pulse <100)   Negative: Fever > 103 F (39.4 C)   Negative: Fever > 101 F (38.3 C) and over 60 years of age   Negative: Fever > 100.0 F (37.8 C) and bedridden (e.g., CVA, chronic illness, recovering from surgery)    Protocols used: Coronavirus (COVID-19) Diagnosed or [id number]    RN COVID TREATMENT VISIT  12/13/23      The patient has been triaged and does not require a higher level of care.    Jt Venegas  58 year old  Current weight? 215 lbs    Has the patient been seen by a primary care provider at an [facility] or [facility] within the past two years? Yes.   Have you been in close proximity to/do you have a known exposure to a person with a confirmed case of influenza? No.     General treatment eligibility:  Date of positive COVID test (PCR or at home)?  12/13/23    Are you or have you been hospitalized for this COVID-19 infection? No.   Have you received monoclonal antibodies or antiviral treatment for COVID-19 since this positive test? No.   Do you have any of the following conditions that place you at risk of  being very sick from COVID-19?   - Age 50 years or older  - Diabetes mellitus, type 1 and type 2  - Heart conditions such as cardiomyopathies, congenital heart defects, coronary artery disease, heart arrhythmias, heart failure, hypertension, valve disorders   Yes, patient has at least one high risk condition as noted above.     Current COVID symptoms:   - fever or chills  - cough  - fatigue  - muscle or body aches  - headache  - new loss of taste or smell  - congestion or runny nose  Yes. Patient has at least one symptom as selected.     How many days since symptoms started? 5 days or less. Established patient, 12 years or older weighing at least 88.2 lbs, who has symptoms that started in the past 5 days, has not been hospitalized nor received treatment already, and is at risk for being very sick from COVID-19.     Treatment eligibility by RN:  Are you currently pregnant or nursing? No  Do you have a clinically significant hypersensitivity to nirmatrelvir or ritonavir, or toxic epidermal necrolysis (TEN) or Park-Zana Syndrome? No  Do you have a history of hepatitis, any hepatic impairment on the Problem List (such as Child-Joyner Class C, cirrhosis, fatty liver disease, alcoholic liver disease), or was the last liver lab (hepatic panel, ALT, AST, ALK Phos, bilirubin) elevated in the past 6 months? No  Do you have any history of severe renal impairment (eGFR < 30mL/min)? No    Is patient eligible to continue? Yes, patient meets all eligibility requirements for the RN COVID treatment (as denoted by all no responses above).     Current Outpatient Medications   Medication Sig Dispense Refill    aspirin 81 MG EC tablet [ASPIRIN 81 MG EC TABLET] Take 81 mg by mouth daily.      atorvastatin (LIPITOR) 20 MG tablet Take 1 tablet (20 mg) by mouth At Bedtime 90 tablet 3    blood-glucose meter Misc [BLOOD-GLUCOSE METER MISC] Use 1 Device As Directed 2 (two) times a day. 1 each 0    buPROPion (WELLBUTRIN XL) 150 MG 24 hr  tablet Take 1 tablet (150 mg) by mouth every morning 90 tablet 1    clopidogrel (PLAVIX) 75 MG tablet Take 1 tablet (75 mg) by mouth daily 90 tablet 4    insulin aspart (NOVOLOG FLEXPEN) 100 UNIT/ML pen Inject 4 units under the skin 3 times daily before meals as directed + correction scale (max 40 units/day). [employer] [employer] 15 mL 11    insulin glargine (LANTUS PEN) 100 UNIT/ML pen Inject 36 Units Subcutaneous every morning (before breakfast) 45 mL 3    lisinopril (ZESTRIL) 20 MG tablet Take 1 tablet (20 mg) by mouth daily 90 tablet 1    metFORMIN (GLUCOPHAGE XR) 500 MG 24 hr tablet TAKE FOUR TABLETS BY MOUTH EVERY DAY WITH DINNER 360 tablet 3    sildenafiL (VIAGRA) 50 MG tablet [SILDENAFIL (VIAGRA) 50 MG TABLET] Take 1 tablet (50 mg total) by mouth as needed for erectile dysfunction. 20 tablet 11       Medications from List 1 of the standing order (on medications that exclude the use of Paxlovid) that patient is taking: NONE. Is patient taking Eladia's Wort? No  Is patient taking Powhattan's Wort or any meds from List 1? No.   Medications from List 2 of the standing order (on meds that provider needs to adjust) that patient is taking: clopidogrel (Plavix), explained a provider visit is necessary to discuss medication adjustments while taking Paxlovid. Is patient on any of the meds from List 2? Yes. Patient will be scheduled or transferred to a  at the end of this call.     LEONIE Grove, RN  [facility]

## 2023-12-13 NOTE — TELEPHONE ENCOUNTER
Reason for call:  Other     Patient called regarding (reason for call): call back    Additional comments: Patient is calling and stating that he tested positive on 12/13/2023 and is asking for Paxlovid. Please advise and call patient back with updates please and thank you.    Phone number to reach patient:  Cell number on file:    Telephone Information:   Mobile [phone]       Best Time:  any    Can we leave a detailed message on this number?  YES

## 2023-12-23 ENCOUNTER — HEALTH MAINTENANCE LETTER (OUTPATIENT)
Age: 58
End: 2023-12-23

## 2023-12-28 DIAGNOSIS — I10 ESSENTIAL HYPERTENSION: ICD-10-CM

## 2023-12-28 RX ORDER — LISINOPRIL 20 MG/1
20 TABLET ORAL DAILY
Qty: 90 TABLET | Refills: 1 | Status: SHIPPED | OUTPATIENT
Start: 2023-12-28 | End: 2024-06-06

## 2023-12-28 NOTE — TELEPHONE ENCOUNTER
Pending Prescriptions:                       Disp   Refills    lisinopril (ZESTRIL) 20 MG tablet         90 tab*1            Sig: Take 1 tablet (20 mg) by mouth daily      Pt has been out for at least a week.

## 2024-03-13 ENCOUNTER — PATIENT OUTREACH (OUTPATIENT)
Dept: CARE COORDINATION | Facility: CLINIC | Age: 59
End: 2024-03-13

## 2024-03-27 ENCOUNTER — PATIENT OUTREACH (OUTPATIENT)
Dept: CARE COORDINATION | Facility: CLINIC | Age: 59
End: 2024-03-27

## 2024-04-05 ENCOUNTER — TELEPHONE (OUTPATIENT)
Dept: FAMILY MEDICINE | Facility: CLINIC | Age: 59
End: 2024-04-05

## 2024-04-05 NOTE — TELEPHONE ENCOUNTER
Patient Quality Outreach    Patient is due for the following:   Diabetes -  Eye Exam and Diabetic Follow-Up Visit/Physical Exam    Next Steps:   Schedule a office visit for follow up/Physical Exam with PCP and DM Eye Exam with Eye Clinic    Type of outreach:    Phone, left message for patient/parent to call back.    Next Steps:  Reach out within 90 days via Phone and Lighting by [employer].    Max number of attempts reached: No. Will try again in 90 days if patient still on fail list.    Questions for provider review:    None           KD GroveN, RN  [facility]

## 2024-04-23 DIAGNOSIS — Z79.4 TYPE 2 DIABETES MELLITUS WITH OTHER CIRCULATORY COMPLICATION, WITH LONG-TERM CURRENT USE OF INSULIN (H): ICD-10-CM

## 2024-04-23 DIAGNOSIS — E11.59 TYPE 2 DIABETES MELLITUS WITH OTHER CIRCULATORY COMPLICATION, WITH LONG-TERM CURRENT USE OF INSULIN (H): ICD-10-CM

## 2024-04-23 NOTE — TELEPHONE ENCOUNTER
Please call patient.  Last office visit greater than 1 year ago, needs follow-up for diabetes.  Refill sent for insulin in the meantime.

## 2024-05-11 ENCOUNTER — HEALTH MAINTENANCE LETTER (OUTPATIENT)
Age: 59
End: 2024-05-11

## 2024-05-16 DIAGNOSIS — Z79.4 TYPE 2 DIABETES MELLITUS WITH OTHER CIRCULATORY COMPLICATION, WITH LONG-TERM CURRENT USE OF INSULIN (H): ICD-10-CM

## 2024-05-16 DIAGNOSIS — E11.59 TYPE 2 DIABETES MELLITUS WITH OTHER CIRCULATORY COMPLICATION, WITH LONG-TERM CURRENT USE OF INSULIN (H): ICD-10-CM

## 2024-05-16 DIAGNOSIS — F32.1 MAJOR DEPRESSIVE DISORDER, SINGLE EPISODE, MODERATE (H): ICD-10-CM

## 2024-05-16 DIAGNOSIS — I63.512 ACUTE ISCHEMIC CEREBROVASCULAR ACCIDENT (CVA) INVOLVING LEFT MIDDLE CEREBRAL ARTERY TERRITORY (H): ICD-10-CM

## 2024-05-16 NOTE — TELEPHONE ENCOUNTER
Pending Prescriptions:                       Disp   Refills    buPROPion (WELLBUTRIN XL) 150 MG 24 hr ta*90 tab*1            Sig: Take 1 tablet (150 mg) by mouth every morning    insulin aspart (NOVOLOG FLEXPEN) 100 UNIT*15 mL  11           Sig: Inject 4 units under the skin 3 times daily           before meals as directed + correction scale (max           40 units/day). [employer]    clopidogrel (PLAVIX) 75 MG tablet         90 tab*4            Sig: Take 1 tablet (75 mg) by mouth daily

## 2024-05-17 RX ORDER — CLOPIDOGREL BISULFATE 75 MG/1
75 TABLET ORAL DAILY
Qty: 90 TABLET | Refills: 4 | Status: SHIPPED | OUTPATIENT
Start: 2024-05-17

## 2024-05-17 RX ORDER — BUPROPION HYDROCHLORIDE 150 MG/1
150 TABLET ORAL EVERY MORNING
Qty: 90 TABLET | Refills: 1 | Status: SHIPPED | OUTPATIENT
Start: 2024-05-17

## 2024-05-17 RX ORDER — INSULIN ASPART 100 [IU]/ML
INJECTION, SOLUTION INTRAVENOUS; SUBCUTANEOUS
Qty: 15 ML | Refills: 11 | Status: SHIPPED | OUTPATIENT
Start: 2024-05-17 | End: 2024-06-06

## 2024-05-20 DIAGNOSIS — I63.512 ACUTE ISCHEMIC CEREBROVASCULAR ACCIDENT (CVA) INVOLVING LEFT MIDDLE CEREBRAL ARTERY TERRITORY (H): ICD-10-CM

## 2024-05-20 NOTE — TELEPHONE ENCOUNTER
Pending Prescriptions:                       Disp   Refills    clopidogrel (PLAVIX) 75 MG tablet         90 tab*4            Sig: Take 1 tablet (75 mg) by mouth daily

## 2024-05-21 RX ORDER — CLOPIDOGREL BISULFATE 75 MG/1
75 TABLET ORAL DAILY
Qty: 90 TABLET | Refills: 4 | OUTPATIENT
Start: 2024-05-21

## 2024-06-02 PROBLEM — F32.1 MAJOR DEPRESSIVE DISORDER, SINGLE EPISODE, MODERATE (H): Status: ACTIVE | Noted: 2024-06-02

## 2024-06-06 ENCOUNTER — OFFICE VISIT (OUTPATIENT)
Dept: FAMILY MEDICINE | Facility: CLINIC | Age: 59
End: 2024-06-06

## 2024-06-06 ENCOUNTER — PATIENT OUTREACH (OUTPATIENT)
Dept: CARE COORDINATION | Facility: CLINIC | Age: 59
End: 2024-06-06

## 2024-06-06 VITALS
TEMPERATURE: 97.6 F | WEIGHT: 228.5 LBS | BODY MASS INDEX: 34.63 KG/M2 | DIASTOLIC BLOOD PRESSURE: 97 MMHG | HEIGHT: 68 IN | RESPIRATION RATE: 12 BRPM | HEART RATE: 94 BPM | OXYGEN SATURATION: 97 % | SYSTOLIC BLOOD PRESSURE: 166 MMHG

## 2024-06-06 DIAGNOSIS — Z79.4 TYPE 2 DIABETES MELLITUS WITH DIABETIC POLYNEUROPATHY, WITH LONG-TERM CURRENT USE OF INSULIN (H): Primary | ICD-10-CM

## 2024-06-06 DIAGNOSIS — Z86.73 HISTORY OF STROKE: ICD-10-CM

## 2024-06-06 DIAGNOSIS — F17.200 TOBACCO DEPENDENCE SYNDROME: ICD-10-CM

## 2024-06-06 DIAGNOSIS — R53.82 CHRONIC FATIGUE: ICD-10-CM

## 2024-06-06 DIAGNOSIS — E78.5 HYPERLIPIDEMIA, UNSPECIFIED HYPERLIPIDEMIA TYPE: ICD-10-CM

## 2024-06-06 DIAGNOSIS — I10 ESSENTIAL HYPERTENSION: ICD-10-CM

## 2024-06-06 DIAGNOSIS — I63.232 STENOSIS OF LEFT INTERNAL CAROTID ARTERY WITH CEREBRAL INFARCTION (H): ICD-10-CM

## 2024-06-06 DIAGNOSIS — E11.42 TYPE 2 DIABETES MELLITUS WITH DIABETIC POLYNEUROPATHY, WITH LONG-TERM CURRENT USE OF INSULIN (H): Primary | ICD-10-CM

## 2024-06-06 DIAGNOSIS — F32.1 MAJOR DEPRESSIVE DISORDER, SINGLE EPISODE, MODERATE (H): ICD-10-CM

## 2024-06-06 LAB
CREAT UR-MCNC: 133 MG/DL
HBA1C MFR BLD: 6.9 % (ref 0–5.6)
HOLD SPECIMEN: NORMAL
HOLD SPECIMEN: NORMAL
MICROALBUMIN UR-MCNC: 726 MG/L
MICROALBUMIN/CREAT UR: 545.86 MG/G CR (ref 0–17)

## 2024-06-06 PROCEDURE — 96127 BRIEF EMOTIONAL/BEHAV ASSMT: CPT | Performed by: FAMILY MEDICINE

## 2024-06-06 PROCEDURE — 82043 UR ALBUMIN QUANTITATIVE: CPT | Performed by: FAMILY MEDICINE

## 2024-06-06 PROCEDURE — 99396 PREV VISIT EST AGE 40-64: CPT | Performed by: FAMILY MEDICINE

## 2024-06-06 PROCEDURE — 83036 HEMOGLOBIN GLYCOSYLATED A1C: CPT | Performed by: FAMILY MEDICINE

## 2024-06-06 PROCEDURE — 80061 LIPID PANEL: CPT | Performed by: FAMILY MEDICINE

## 2024-06-06 PROCEDURE — 80053 COMPREHEN METABOLIC PANEL: CPT | Performed by: FAMILY MEDICINE

## 2024-06-06 PROCEDURE — 36415 COLL VENOUS BLD VENIPUNCTURE: CPT | Performed by: FAMILY MEDICINE

## 2024-06-06 PROCEDURE — 99214 OFFICE O/P EST MOD 30 MIN: CPT | Mod: 25 | Performed by: FAMILY MEDICINE

## 2024-06-06 PROCEDURE — 82570 ASSAY OF URINE CREATININE: CPT | Performed by: FAMILY MEDICINE

## 2024-06-06 PROCEDURE — 84403 ASSAY OF TOTAL TESTOSTERONE: CPT | Performed by: FAMILY MEDICINE

## 2024-06-06 RX ORDER — LISINOPRIL 20 MG/1
40 TABLET ORAL DAILY
Qty: 90 TABLET | Refills: 1 | Status: SHIPPED | OUTPATIENT
Start: 2024-06-06 | End: 2024-06-06

## 2024-06-06 RX ORDER — LISINOPRIL 40 MG/1
40 TABLET ORAL DAILY
Qty: 90 TABLET | Refills: 1 | Status: SHIPPED | OUTPATIENT
Start: 2024-06-06

## 2024-06-06 SDOH — HEALTH STABILITY: PHYSICAL HEALTH: ON AVERAGE, HOW MANY DAYS PER WEEK DO YOU ENGAGE IN MODERATE TO STRENUOUS EXERCISE (LIKE A BRISK WALK)?: 7 DAYS

## 2024-06-06 SDOH — HEALTH STABILITY: PHYSICAL HEALTH: ON AVERAGE, HOW MANY MINUTES DO YOU ENGAGE IN EXERCISE AT THIS LEVEL?: 130 MIN

## 2024-06-06 ASSESSMENT — PATIENT HEALTH QUESTIONNAIRE - PHQ9
10. IF YOU CHECKED OFF ANY PROBLEMS, HOW DIFFICULT HAVE THESE PROBLEMS MADE IT FOR YOU TO DO YOUR WORK, TAKE CARE OF THINGS AT HOME, OR GET ALONG WITH OTHER PEOPLE: NOT DIFFICULT AT ALL
SUM OF ALL RESPONSES TO PHQ QUESTIONS 1-9: 9
SUM OF ALL RESPONSES TO PHQ QUESTIONS 1-9: 9

## 2024-06-06 ASSESSMENT — SOCIAL DETERMINANTS OF HEALTH (SDOH): HOW OFTEN DO YOU GET TOGETHER WITH FRIENDS OR RELATIVES?: TWICE A WEEK

## 2024-06-06 ASSESSMENT — PAIN SCALES - GENERAL: PAINLEVEL: NO PAIN (0)

## 2024-06-06 NOTE — PROGRESS NOTES
Preventive Care Visit  [facility]  Kylie Armijo MD, Family Medicine  Jun 6, 2024      Assessment & Plan     Type 2 diabetes mellitus with diabetic polyneuropathy, with long-term current use of insulin (H)  Improved and adequate control on metformin and Lantus.  He remains off mealtime aspart which is adequate at this time.  Continue aspirin, statin.  Continue lisinopril for renal protection.  Will check liver function, urine microalbumin.  Due to difficulty with insurance inability to afford medical care, referral is made to care coordination team.  Advise diabetes eye exam.  - Hemoglobin A1c; Standing  - Albumin Random Urine Quantitative with Creat Ratio; Future  - Hemoglobin A1c  - Albumin Random Urine Quantitative with Creat Ratio  - Comprehensive metabolic panel; Future  - Primary Care - Care Coordination Referral; Future  - insulin glargine (LANTUS PEN) 100 UNIT/ML pen; Inject 40 Units Subcutaneous every morning (before breakfast)  - Comprehensive metabolic panel    H/O Lt MCA infaction  Lt ICA stenosis; S/P CEA  Seen previously by vascular center, no longer requiring clopidogrel, remains on aspirin 81 mg daily, statin.  Advised tobacco cessation.    Tobacco dependence syndrome  He continues to smoke with no send does not smoke at all some days, quite variable.  He is not interested in further interventions.  Advised cessation.    Essential hypertension  Inadequately controlled.  Increase lisinopril to 40 mg daily.  Nurse only blood pressure check in 3 weeks, if persistent elevation would add amlodipine.  Avoid hydrochlorothiazide and similar diuretics due to allergy.  - lisinopril (ZESTRIL) 40 MG tablet; Take 1 tablet (40 mg) by mouth daily    Hyperlipidemia, unspecified hyperlipidemia type  Encouraged efforts at healthy lifestyle habits.  Will have him continue atorvastatin at current dose.  Will check lipids today.  - Lipid panel reflex to direct LDL Non-fasting; Future  - Lipid  "panel reflex to direct LDL Non-fasting    Chronic fatigue  Specific.  This in combination with erectile dysfunction deserves a checking of testosterone, level checked today.  Discussed future sleep study to assess for sleep apnea, he will defer this while he is without insurance but consider at future dates.  - Testosterone, total; Future  - Testosterone, total    Major depressive disorder, single episode, moderate (H)  He feels that things are adequately controlled on bupropion at current dose, declines for intervention.            Nicotine/Tobacco Cessation  He reports that he has been smoking cigarettes. He has never used smokeless tobacco.  Nicotine/Tobacco Cessation Plan  Information offered: Patient not interested at this time      BMI  Estimated body mass index is 34.95 kg/m  as calculated from the following:    Height as of this encounter: 1.722 m (5' 7.8\").    Weight as of this encounter: 103.6 kg (228 lb 8 oz).       Counseling  Appropriate preventive services were discussed with this patient, including applicable screening as appropriate for fall prevention, nutrition, physical activity, Tobacco-use cessation, weight loss and cognition.  Checklist reviewing preventive services available has been given to the patient.  Reviewed patient's diet, addressing concerns and/or questions.   The patient was instructed to see the dentist every 6 months.   The patient reports drinking more than one alcoholic drink per day and sometimes engages in binge or excessive drinking. The patient was counseled and given information about possible harmful effects of excessive alcohol intake as well as where to get help for alcohol problems. The patient's PHQ-9 score is consistent with mild depression. He was provided with information regarding depression.           Carolann Waters is a 58 year old, presenting for the following:  Physical (No concerns )        6/6/2024    10:17 AM   Additional Questions   Roomed by aries"       Health Care Directive  Patient does not have a Health Care Directive or Living Will: Discussed advance care planning with patient; however, patient declined at this time.    Seen today for follow-up of his chronic health conditions.  He does not currently have health care and therefore declines full preventive care visit.  He does not check his sugars regularly.  He is compliant with Lantus 40 units daily and metformin XR 2000 mg daily, denies any low sugars.  Remains on atorvastatin and management of dyslipidemia and with previous history of left MCA infarct and left carotid endarterectomy due to stenosis.  He has had some chronic difficulties with clumsiness, overall feels generally weaker, some neuropathy affecting hands and feet but no focal neurologic symptoms that he can recall from having his stroke previously.  Continues to smoke, varying from no cigarettes at all to a full pack per day, states is very stressed dependent.  Struggles with ongoing fatigue, sleeping too much, always feeling tired.  He snores per his wife's report.  He is wondering about low testosterone.  Remains on bupropion XL, feels it is doing well from a mood standpoint but struggles with energy level.            6/6/2024   General Health   How would you rate your overall physical health? (!) POOR   Feel stress (tense, anxious, or unable to sleep) Not at all         6/6/2024   Nutrition   Three or more servings of calcium each day? Yes   Diet: Regular (no restrictions)   How many servings of fruit and vegetables per day? (!) 0-1   How many sweetened beverages each day? 0-1         6/6/2024   Exercise   Days per week of moderate/strenous exercise 7 days   Average minutes spent exercising at this level 130 min         6/6/2024   Social Factors   Frequency of gathering with friends or relatives Twice a week   Worry food won't last until get money to buy more No   Food not last or not have enough money for food? No   Do you have housing?  " Yes   Are you worried about losing your housing? No   Lack of transportation? No   Unable to get utilities (heat,electricity)? No         6/6/2024   Fall Risk   Fallen 2 or more times in the past year? Yes   Trouble with walking or balance? Yes           6/6/2024   Dental   Dentist two times every year? (!) NO         6/6/2024   TB Screening   Were you born outside of the [place]? Yes       Today's PHQ-9 Score:       6/6/2024    10:01 AM   PHQ-9 SCORE   PHQ-9 Total Score MyChart 9 (Mild depression)   PHQ-9 Total Score 9         6/6/2024   Substance Use   Alcohol more than 3/day or more than 7/wk Yes   How often do you have a drink containing alcohol Monthly or less   How many alcohol drinks on typical day 3 or 4   How often do you have 5+ drinks at one occasion Never   Audit 2/3 Score 1   How often not able to stop drinking once started Never   How often failed to do what normally expected Never   How often needed first drink in am after a heavy drinking session Never   How often feeling of guilt or remorse after drinking Never   How often unable to remember what happened the night before Never   Have you or someone else been injured because of your drinking No   Has anyone been concerned or suggested you cut down on drinking No   TOTAL SCORE - AUDIT 2   Do you use any other substances recreationally? No     Social History     Tobacco Use    Smoking status: Every Day     Current packs/day: 0.50     Types: Cigarettes    Smokeless tobacco: Never   Substance Use Topics    Alcohol use: Yes     Comment: one drink every 2 weeks    Drug use: Never      Objective    Exam  BP (!) 170/95 (BP Location: Right arm, Patient Position: Sitting, Cuff Size: Adult Regular)   Pulse 94   Temp 97.6  F (36.4  C) (Oral)   Resp 12   Ht 1.722 m (5' 7.8\")   Wt 103.6 kg (228 lb 8 oz)   SpO2 97%   BMI 34.95 kg/m     Estimated body mass index is 34.95 kg/m  as calculated from the following:    Height as of this encounter: 1.722 m (5' 7.8\").   "  Weight as of this encounter: 103.6 kg (228 lb 8 oz).    Physical Exam  Alert and pleasant.  Mucous membranes moist.  Heart with regular rate and rhythm.  Lungs clear and well aerated.  Extremities without edema.  He has good peripheral pulses.  No tinea pedis seen.  Mildly reduced sensation throughout toes bilaterally.        Signed Electronically by: Kylie Armijo MD    Answers submitted by the patient for this visit:  Patient Health Questionnaire (Submitted on 6/6/2024)  If you checked off any problems, how difficult have these problems made it for you to do your work, take care of things at home, or get along with other people?: Not difficult at all  PHQ9 TOTAL SCORE: 9

## 2024-06-06 NOTE — PROGRESS NOTES
Clinic Care Coordination Contact  [facility]/Voicemail    Clinical Data: Care Coordinator Outreach    Outreach Documentation Number of Outreach Attempt   6/6/2024   2:43 PM 1       Left message on patient's voicemail with call back information and requested return call.    Plan: Care Coordinator will try to reach patient again in 1-2 business days or on 6/7/24.    Order Questions    Question Answer   Reason for Referral: Financial Support   Financial Support: Insurance   Clinical Staff have discussed the Care Coordination Referral with the patient and/or caregiver: Yes         Margret Spann  Community Health Worker   [facility] Care Coordination  [facility]   [email or web address]  Office: [phone]

## 2024-06-06 NOTE — LETTER
[facility] CARE COORDINATION  [facility]   June 7, 2024    Jt Venegas  [address] [address]      Dear Jt,    I am a clinic care coordinator who works with Kylie Armijo MD with the [facility]. I have been trying to reach you recently to introduce Clinic Care Coordination. Below is a description of clinic care coordination and how I can further assist you.       The clinic care coordination team is made up of a registered nurse, , financial resource worker and community health worker who understand the health care system. The goal of clinic care coordination is to help you manage your health and improve access to the health care system. Our team works alongside your provider to assist you in determining your health and social needs. We can help you obtain health care and community resources, providing you with necessary information and education. We can work with you through any barriers and develop a care plan that helps coordinate and strengthen the communication between you and your care team.  Our services are voluntary and are offered without charge to you personally.    Please feel free to contact me with any questions or concerns regarding care coordination and what we can offer.      We are focused on providing you with the highest-quality healthcare experience possible.    Sincerely,     Margret Spann  Community Health Worker   [facility] Care Coordination  [facility]   [email or web address]  Office: [phone]

## 2024-06-07 LAB
ALBUMIN SERPL BCG-MCNC: 4.4 G/DL (ref 3.5–5.2)
ALP SERPL-CCNC: 98 U/L (ref 40–150)
ALT SERPL W P-5'-P-CCNC: 31 U/L (ref 0–70)
ANION GAP SERPL CALCULATED.3IONS-SCNC: 17 MMOL/L (ref 7–15)
AST SERPL W P-5'-P-CCNC: 32 U/L (ref 0–45)
BILIRUB SERPL-MCNC: 0.4 MG/DL
BUN SERPL-MCNC: 23.9 MG/DL (ref 8–23)
CALCIUM SERPL-MCNC: 10 MG/DL (ref 8.6–10)
CHLORIDE SERPL-SCNC: 101 MMOL/L (ref 98–107)
CREAT SERPL-MCNC: 1.85 MG/DL (ref 0.67–1.17)
DEPRECATED HCO3 PLAS-SCNC: 21 MMOL/L (ref 22–29)
EGFRCR SERPLBLD CKD-EPI 2021: 41 ML/MIN/1.73M2
GLUCOSE SERPL-MCNC: 138 MG/DL (ref 70–99)
POTASSIUM SERPL-SCNC: 4.7 MMOL/L (ref 3.4–5.3)
PROT SERPL-MCNC: 7 G/DL (ref 6.4–8.3)
SODIUM SERPL-SCNC: 139 MMOL/L (ref 135–145)

## 2024-06-07 NOTE — PROGRESS NOTES
Clinic Care Coordination Contact  [facility]/Voicemail    Clinical Data: Care Coordinator Outreach    Outreach Documentation Number of Outreach Attempt   6/6/2024   2:43 PM 1   6/7/2024  10:17 AM 2       Left message on patient's voicemail with call back information and requested return call.    Plan: Care Coordinator will send unable to contact letter with care coordinator contact information via [employer]. Care Coordinator will do no further outreaches at this time.      Margret Spann  Community Health Worker   [facility] Care Coordination  [facility]   [email or web address]  Office: [phone]

## 2024-06-08 ENCOUNTER — MYC REFILL (OUTPATIENT)
Dept: FAMILY MEDICINE | Facility: CLINIC | Age: 59
End: 2024-06-08

## 2024-06-08 DIAGNOSIS — E78.5 HYPERLIPIDEMIA, UNSPECIFIED HYPERLIPIDEMIA TYPE: ICD-10-CM

## 2024-06-08 LAB
CHOLEST SERPL-MCNC: 297 MG/DL
HDLC SERPL-MCNC: 48 MG/DL
LDLC SERPL CALC-MCNC: 204 MG/DL
NONHDLC SERPL-MCNC: 249 MG/DL
TRIGL SERPL-MCNC: 225 MG/DL

## 2024-06-09 LAB — TESTOST SERPL-MCNC: 323 NG/DL (ref 240–950)

## 2024-06-10 ENCOUNTER — MYC MEDICAL ADVICE (OUTPATIENT)
Dept: FAMILY MEDICINE | Facility: CLINIC | Age: 59
End: 2024-06-10

## 2024-06-10 DIAGNOSIS — E78.5 HYPERLIPIDEMIA, UNSPECIFIED HYPERLIPIDEMIA TYPE: Primary | ICD-10-CM

## 2024-06-10 RX ORDER — ATORVASTATIN CALCIUM 40 MG/1
40 TABLET, FILM COATED ORAL AT BEDTIME
Qty: 90 TABLET | Refills: 4 | Status: SHIPPED | OUTPATIENT
Start: 2024-06-10 | End: 2024-06-23

## 2024-06-11 ENCOUNTER — TELEPHONE (OUTPATIENT)
Dept: FAMILY MEDICINE | Facility: CLINIC | Age: 59
End: 2024-06-11

## 2024-06-11 NOTE — TELEPHONE ENCOUNTER
----- Message from Kylie Armijo MD sent at 6/11/2024 10:25 AM CDT -----  Your cholesterol is quite high.  Resume atorvastatin as prescribed, and we will recheck this at your follow-up visit in 3 months.    Your kidney function has worsened a bit.  Avoid medications like ibuprofen (Advil,  Motrin) and naproxen (Aleve) and be sure to drink plenty of fluids.  We should recheck your kidney function in about a month.  Your electrolytes look good.  Your liver function looks good.    Your urine test indicates protein leakage from your kidneys, which is a sign of damage to the kidneys from diabetes.  We cannot to slow this down by controlling your blood pressure and blood sugars and by continuing the medication lisinopril.    Your testosterone level is in the normal range.  Low testosterone is not causing any current symptoms.

## 2024-06-11 NOTE — TELEPHONE ENCOUNTER
Left message for patient to return call. If patient calls back, please route to [facility].     TC please route to RN.    Jannie Mckeon RN  [facility]

## 2024-06-11 NOTE — RESULT ENCOUNTER NOTE
Your cholesterol is quite high.  Resume atorvastatin as prescribed, and we will recheck this at your follow-up visit in 3 months.    Your kidney function has worsened a bit.  Avoid medications like ibuprofen (Advil,  Motrin) and naproxen (Aleve) and be sure to drink plenty of fluids.  We should recheck your kidney function in about a month.  Your electrolytes look good.  Your liver function looks good.    Your urine test indicates protein leakage from your kidneys, which is a sign of damage to the kidneys from diabetes.  We cannot to slow this down by controlling your blood pressure and blood sugars and by continuing the medication lisinopril.    Your testosterone level is in the normal range.  Low testosterone is not causing any current symptoms.

## 2024-06-12 NOTE — TELEPHONE ENCOUNTER
Salvador Tee RN called Jt on 6/12 and left a message to return call to clinic. If patient calls back, please route to [facility].     TC please route to RN.     Salvador Tee RN  [facility]

## 2024-06-14 RX ORDER — ROSUVASTATIN CALCIUM 40 MG/1
40 TABLET, COATED ORAL DAILY
Qty: 90 TABLET | Refills: 4 | Status: SHIPPED | OUTPATIENT
Start: 2024-06-14

## 2024-06-14 NOTE — TELEPHONE ENCOUNTER
Please see mychart enccounter from 6/10/24, patient is in contact with PCP and has received and read the result notes from provider.    KD LeosN, RN  [facility]

## 2024-06-23 ENCOUNTER — APPOINTMENT (OUTPATIENT)
Dept: CT IMAGING | Facility: CLINIC | Age: 59
End: 2024-06-23
Attending: EMERGENCY MEDICINE

## 2024-06-23 ENCOUNTER — APPOINTMENT (OUTPATIENT)
Dept: MRI IMAGING | Facility: CLINIC | Age: 59
End: 2024-06-23
Attending: INTERNAL MEDICINE

## 2024-06-23 ENCOUNTER — HOSPITAL ENCOUNTER (OUTPATIENT)
Facility: CLINIC | Age: 59
Setting detail: OBSERVATION
Discharge: HOME | End: 2024-06-24
Attending: EMERGENCY MEDICINE | Admitting: EMERGENCY MEDICINE

## 2024-06-23 DIAGNOSIS — F17.200 TOBACCO DEPENDENCE SYNDROME: Primary | ICD-10-CM

## 2024-06-23 DIAGNOSIS — I63.9 ACUTE CVA (CEREBROVASCULAR ACCIDENT) (H): ICD-10-CM

## 2024-06-23 LAB
ANION GAP SERPL CALCULATED.3IONS-SCNC: 10 MMOL/L (ref 7–15)
APTT PPP: 24 SECONDS (ref 22–38)
BASOPHILS # BLD AUTO: 0.1 10E3/UL (ref 0–0.2)
BASOPHILS NFR BLD AUTO: 1 %
BUN SERPL-MCNC: 19.1 MG/DL (ref 8–23)
CALCIUM SERPL-MCNC: 9.7 MG/DL (ref 8.6–10)
CHLORIDE SERPL-SCNC: 106 MMOL/L (ref 98–107)
CREAT SERPL-MCNC: 1.75 MG/DL (ref 0.67–1.17)
DEPRECATED HCO3 PLAS-SCNC: 25 MMOL/L (ref 22–29)
EGFRCR SERPLBLD CKD-EPI 2021: 44 ML/MIN/1.73M2
EOSINOPHIL # BLD AUTO: 0.5 10E3/UL (ref 0–0.7)
EOSINOPHIL NFR BLD AUTO: 5 %
ERYTHROCYTE [DISTWIDTH] IN BLOOD BY AUTOMATED COUNT: 12.9 % (ref 10–15)
GLUCOSE BLDC GLUCOMTR-MCNC: 114 MG/DL (ref 70–99)
GLUCOSE SERPL-MCNC: 132 MG/DL (ref 70–99)
HCT VFR BLD AUTO: 38.1 % (ref 40–53)
HGB BLD-MCNC: 13 G/DL (ref 13.3–17.7)
IMM GRANULOCYTES # BLD: 0.1 10E3/UL
IMM GRANULOCYTES NFR BLD: 1 %
INR PPP: 0.98 (ref 0.85–1.15)
LYMPHOCYTES # BLD AUTO: 2.8 10E3/UL (ref 0.8–5.3)
LYMPHOCYTES NFR BLD AUTO: 27 %
MCH RBC QN AUTO: 30.8 PG (ref 26.5–33)
MCHC RBC AUTO-ENTMCNC: 34.1 G/DL (ref 31.5–36.5)
MCV RBC AUTO: 90 FL (ref 78–100)
MONOCYTES # BLD AUTO: 0.7 10E3/UL (ref 0–1.3)
MONOCYTES NFR BLD AUTO: 7 %
NEUTROPHILS # BLD AUTO: 6.5 10E3/UL (ref 1.6–8.3)
NEUTROPHILS NFR BLD AUTO: 61 %
NRBC # BLD AUTO: 0 10E3/UL
NRBC BLD AUTO-RTO: 0 /100
PLATELET # BLD AUTO: 303 10E3/UL (ref 150–450)
POTASSIUM SERPL-SCNC: 4.2 MMOL/L (ref 3.4–5.3)
RBC # BLD AUTO: 4.22 10E6/UL (ref 4.4–5.9)
SODIUM SERPL-SCNC: 141 MMOL/L (ref 135–145)
TROPONIN T SERPL HS-MCNC: 32 NG/L
TROPONIN T SERPL HS-MCNC: 33 NG/L
WBC # BLD AUTO: 10.6 10E3/UL (ref 4–11)

## 2024-06-23 PROCEDURE — 0042T CT HEAD PERFUSION W CONTRAST: CPT

## 2024-06-23 PROCEDURE — 82962 GLUCOSE BLOOD TEST: CPT

## 2024-06-23 PROCEDURE — 84484 ASSAY OF TROPONIN QUANT: CPT | Performed by: HOSPITALIST

## 2024-06-23 PROCEDURE — 84484 ASSAY OF TROPONIN QUANT: CPT | Performed by: EMERGENCY MEDICINE

## 2024-06-23 PROCEDURE — 80048 BASIC METABOLIC PNL TOTAL CA: CPT | Performed by: EMERGENCY MEDICINE

## 2024-06-23 PROCEDURE — 36415 COLL VENOUS BLD VENIPUNCTURE: CPT | Performed by: EMERGENCY MEDICINE

## 2024-06-23 PROCEDURE — 250N000011 HC RX IP 250 OP 636: Performed by: EMERGENCY MEDICINE

## 2024-06-23 PROCEDURE — 85610 PROTHROMBIN TIME: CPT | Performed by: EMERGENCY MEDICINE

## 2024-06-23 PROCEDURE — 36415 COLL VENOUS BLD VENIPUNCTURE: CPT | Performed by: HOSPITALIST

## 2024-06-23 PROCEDURE — 70450 CT HEAD/BRAIN W/O DYE: CPT

## 2024-06-23 PROCEDURE — 80061 LIPID PANEL: CPT | Performed by: HOSPITALIST

## 2024-06-23 PROCEDURE — 99223 1ST HOSP IP/OBS HIGH 75: CPT | Performed by: HOSPITALIST

## 2024-06-23 PROCEDURE — 70551 MRI BRAIN STEM W/O DYE: CPT

## 2024-06-23 PROCEDURE — 85730 THROMBOPLASTIN TIME PARTIAL: CPT | Performed by: EMERGENCY MEDICINE

## 2024-06-23 PROCEDURE — G0378 HOSPITAL OBSERVATION PER HR: HCPCS

## 2024-06-23 PROCEDURE — 85025 COMPLETE CBC W/AUTO DIFF WBC: CPT | Performed by: EMERGENCY MEDICINE

## 2024-06-23 PROCEDURE — 99285 EMERGENCY DEPT VISIT HI MDM: CPT | Mod: 25

## 2024-06-23 PROCEDURE — G0425 INPT/ED TELECONSULT30: HCPCS | Performed by: INTERNAL MEDICINE

## 2024-06-23 PROCEDURE — 70496 CT ANGIOGRAPHY HEAD: CPT

## 2024-06-23 PROCEDURE — 70544 MR ANGIOGRAPHY HEAD W/O DYE: CPT

## 2024-06-23 PROCEDURE — 93005 ELECTROCARDIOGRAM TRACING: CPT | Performed by: EMERGENCY MEDICINE

## 2024-06-23 RX ORDER — ROSUVASTATIN CALCIUM 10 MG/1
40 TABLET, COATED ORAL DAILY
Status: DISCONTINUED | OUTPATIENT
Start: 2024-06-24 | End: 2024-06-23

## 2024-06-23 RX ORDER — PROCHLORPERAZINE 25 MG
25 SUPPOSITORY, RECTAL RECTAL EVERY 12 HOURS PRN
Status: DISCONTINUED | OUTPATIENT
Start: 2024-06-23 | End: 2024-06-25 | Stop reason: HOSPADM

## 2024-06-23 RX ORDER — BUPROPION HYDROCHLORIDE 150 MG/1
150 TABLET ORAL EVERY MORNING
Status: DISCONTINUED | OUTPATIENT
Start: 2024-06-24 | End: 2024-06-25 | Stop reason: HOSPADM

## 2024-06-23 RX ORDER — IOPAMIDOL 755 MG/ML
100 INJECTION, SOLUTION INTRAVASCULAR ONCE
Status: COMPLETED | OUTPATIENT
Start: 2024-06-23 | End: 2024-06-23

## 2024-06-23 RX ORDER — MULTIVITAMIN,THERAPEUTIC
1 TABLET ORAL DAILY
COMMUNITY

## 2024-06-23 RX ORDER — NICOTINE 21 MG/24HR
1 PATCH, TRANSDERMAL 24 HOURS TRANSDERMAL DAILY
Status: DISCONTINUED | OUTPATIENT
Start: 2024-06-24 | End: 2024-06-25 | Stop reason: HOSPADM

## 2024-06-23 RX ORDER — DEXTROSE MONOHYDRATE 25 G/50ML
25-50 INJECTION, SOLUTION INTRAVENOUS
Status: DISCONTINUED | OUTPATIENT
Start: 2024-06-23 | End: 2024-06-25 | Stop reason: HOSPADM

## 2024-06-23 RX ORDER — ATORVASTATIN CALCIUM 40 MG/1
80 TABLET, FILM COATED ORAL EVERY EVENING
Status: DISCONTINUED | OUTPATIENT
Start: 2024-06-23 | End: 2024-06-24

## 2024-06-23 RX ORDER — ASPIRIN 325 MG
325 TABLET, DELAYED RELEASE (ENTERIC COATED) ORAL DAILY
Status: DISCONTINUED | OUTPATIENT
Start: 2024-06-23 | End: 2024-06-25 | Stop reason: HOSPADM

## 2024-06-23 RX ORDER — ATORVASTATIN CALCIUM 10 MG/1
20 TABLET, FILM COATED ORAL DAILY
Status: DISCONTINUED | OUTPATIENT
Start: 2024-06-24 | End: 2024-06-24

## 2024-06-23 RX ORDER — ATORVASTATIN CALCIUM 40 MG/1
20 TABLET, FILM COATED ORAL DAILY
COMMUNITY
End: 2024-06-24

## 2024-06-23 RX ORDER — ONDANSETRON 2 MG/ML
4 INJECTION INTRAMUSCULAR; INTRAVENOUS EVERY 6 HOURS PRN
Status: DISCONTINUED | OUTPATIENT
Start: 2024-06-23 | End: 2024-06-25 | Stop reason: HOSPADM

## 2024-06-23 RX ORDER — NICOTINE POLACRILEX 4 MG
15-30 LOZENGE BUCCAL
Status: DISCONTINUED | OUTPATIENT
Start: 2024-06-23 | End: 2024-06-25 | Stop reason: HOSPADM

## 2024-06-23 RX ORDER — ONDANSETRON 4 MG/1
4 TABLET, ORALLY DISINTEGRATING ORAL EVERY 6 HOURS PRN
Status: DISCONTINUED | OUTPATIENT
Start: 2024-06-23 | End: 2024-06-25 | Stop reason: HOSPADM

## 2024-06-23 RX ORDER — LIDOCAINE 40 MG/G
CREAM TOPICAL
Status: DISCONTINUED | OUTPATIENT
Start: 2024-06-23 | End: 2024-06-25 | Stop reason: HOSPADM

## 2024-06-23 RX ORDER — LISINOPRIL 10 MG/1
40 TABLET ORAL DAILY
Status: DISCONTINUED | OUTPATIENT
Start: 2024-06-24 | End: 2024-06-25 | Stop reason: HOSPADM

## 2024-06-23 RX ORDER — PROCHLORPERAZINE MALEATE 10 MG
10 TABLET ORAL EVERY 6 HOURS PRN
Status: DISCONTINUED | OUTPATIENT
Start: 2024-06-23 | End: 2024-06-25 | Stop reason: HOSPADM

## 2024-06-23 RX ORDER — METFORMIN HCL 500 MG
1000 TABLET, EXTENDED RELEASE 24 HR ORAL
COMMUNITY

## 2024-06-23 RX ORDER — ASPIRIN 81 MG/1
81 TABLET ORAL DAILY
Status: DISCONTINUED | OUTPATIENT
Start: 2024-06-24 | End: 2024-06-23

## 2024-06-23 RX ORDER — CLOPIDOGREL BISULFATE 75 MG/1
75 TABLET ORAL DAILY
Status: DISCONTINUED | OUTPATIENT
Start: 2024-06-24 | End: 2024-06-25 | Stop reason: HOSPADM

## 2024-06-23 RX ORDER — IOPAMIDOL 755 MG/ML
167 INJECTION, SOLUTION INTRAVASCULAR ONCE
Status: COMPLETED | OUTPATIENT
Start: 2024-06-23 | End: 2024-06-23

## 2024-06-23 RX ADMIN — IOPAMIDOL 75 ML: 755 INJECTION, SOLUTION INTRAVENOUS at 20:19

## 2024-06-23 RX ADMIN — IOPAMIDOL 100 ML: 755 INJECTION, SOLUTION INTRAVENOUS at 20:46

## 2024-06-23 ASSESSMENT — ACTIVITIES OF DAILY LIVING (ADL)
ADLS_ACUITY_SCORE: 35

## 2024-06-23 ASSESSMENT — ENCOUNTER SYMPTOMS
WEAKNESS: 1
SPEECH DIFFICULTY: 1
LIGHT-HEADEDNESS: 0
HEADACHES: 0
NECK PAIN: 0

## 2024-06-23 ASSESSMENT — COLUMBIA-SUICIDE SEVERITY RATING SCALE - C-SSRS
2. HAVE YOU ACTUALLY HAD ANY THOUGHTS OF KILLING YOURSELF IN THE PAST MONTH?: NO
1. IN THE PAST MONTH, HAVE YOU WISHED YOU WERE DEAD OR WISHED YOU COULD GO TO SLEEP AND NOT WAKE UP?: NO
6. HAVE YOU EVER DONE ANYTHING, STARTED TO DO ANYTHING, OR PREPARED TO DO ANYTHING TO END YOUR LIFE?: NO

## 2024-06-24 ENCOUNTER — APPOINTMENT (OUTPATIENT)
Dept: CARDIOLOGY | Facility: CLINIC | Age: 59
End: 2024-06-24
Attending: HOSPITALIST

## 2024-06-24 VITALS
RESPIRATION RATE: 19 BRPM | BODY MASS INDEX: 34.48 KG/M2 | SYSTOLIC BLOOD PRESSURE: 125 MMHG | TEMPERATURE: 98 F | OXYGEN SATURATION: 95 % | DIASTOLIC BLOOD PRESSURE: 71 MMHG | HEART RATE: 93 BPM | WEIGHT: 225.4 LBS

## 2024-06-24 LAB
ANION GAP SERPL CALCULATED.3IONS-SCNC: 10 MMOL/L (ref 7–15)
ATRIAL RATE - MUSE: 103 BPM
BUN SERPL-MCNC: 16.9 MG/DL (ref 8–23)
CALCIUM SERPL-MCNC: 9.6 MG/DL (ref 8.6–10)
CHLORIDE SERPL-SCNC: 107 MMOL/L (ref 98–107)
CHOLEST SERPL-MCNC: 198 MG/DL
CREAT SERPL-MCNC: 1.52 MG/DL (ref 0.67–1.17)
DEPRECATED HCO3 PLAS-SCNC: 24 MMOL/L (ref 22–29)
DIASTOLIC BLOOD PRESSURE - MUSE: NORMAL MMHG
EGFRCR SERPLBLD CKD-EPI 2021: 52 ML/MIN/1.73M2
ERYTHROCYTE [DISTWIDTH] IN BLOOD BY AUTOMATED COUNT: 13 % (ref 10–15)
FASTING STATUS PATIENT QL REPORTED: ABNORMAL
GLUCOSE BLDC GLUCOMTR-MCNC: 110 MG/DL (ref 70–99)
GLUCOSE BLDC GLUCOMTR-MCNC: 114 MG/DL (ref 70–99)
GLUCOSE BLDC GLUCOMTR-MCNC: 116 MG/DL (ref 70–99)
GLUCOSE BLDC GLUCOMTR-MCNC: 117 MG/DL (ref 70–99)
GLUCOSE BLDC GLUCOMTR-MCNC: 124 MG/DL (ref 70–99)
GLUCOSE SERPL-MCNC: 110 MG/DL (ref 70–99)
HCT VFR BLD AUTO: 38.5 % (ref 40–53)
HDLC SERPL-MCNC: 38 MG/DL
HGB BLD-MCNC: 13 G/DL (ref 13.3–17.7)
INTERPRETATION ECG - MUSE: NORMAL
LDLC SERPL CALC-MCNC: 128 MG/DL
LVEF ECHO: NORMAL
MCH RBC QN AUTO: 30.4 PG (ref 26.5–33)
MCHC RBC AUTO-ENTMCNC: 33.8 G/DL (ref 31.5–36.5)
MCV RBC AUTO: 90 FL (ref 78–100)
NONHDLC SERPL-MCNC: 160 MG/DL
P AXIS - MUSE: 53 DEGREES
PLATELET # BLD AUTO: 301 10E3/UL (ref 150–450)
POTASSIUM SERPL-SCNC: 4.3 MMOL/L (ref 3.4–5.3)
PR INTERVAL - MUSE: 184 MS
QRS DURATION - MUSE: 106 MS
QT - MUSE: 360 MS
QTC - MUSE: 471 MS
R AXIS - MUSE: -42 DEGREES
RBC # BLD AUTO: 4.27 10E6/UL (ref 4.4–5.9)
SODIUM SERPL-SCNC: 141 MMOL/L (ref 135–145)
SYSTOLIC BLOOD PRESSURE - MUSE: NORMAL MMHG
T AXIS - MUSE: 56 DEGREES
TRIGL SERPL-MCNC: 162 MG/DL
TROPONIN T SERPL HS-MCNC: 34 NG/L
TSH SERPL DL<=0.005 MIU/L-ACNC: 3.33 UIU/ML (ref 0.3–4.2)
VENTRICULAR RATE- MUSE: 103 BPM
WBC # BLD AUTO: 9.4 10E3/UL (ref 4–11)

## 2024-06-24 PROCEDURE — 999N000111 HC STATISTIC OT IP EVAL DEFER

## 2024-06-24 PROCEDURE — 250N000012 HC RX MED GY IP 250 OP 636 PS 637: Performed by: HOSPITALIST

## 2024-06-24 PROCEDURE — 99239 HOSP IP/OBS DSCHRG MGMT >30: CPT | Performed by: FAMILY MEDICINE

## 2024-06-24 PROCEDURE — 999N000208 ECHOCARDIOGRAM COMPLETE

## 2024-06-24 PROCEDURE — 255N000002 HC RX 255 OP 636: Performed by: FAMILY MEDICINE

## 2024-06-24 PROCEDURE — 82962 GLUCOSE BLOOD TEST: CPT

## 2024-06-24 PROCEDURE — 85027 COMPLETE CBC AUTOMATED: CPT | Performed by: HOSPITALIST

## 2024-06-24 PROCEDURE — 999N000226 HC STATISTIC SLP IP EVAL DEFER

## 2024-06-24 PROCEDURE — 84443 ASSAY THYROID STIM HORMONE: CPT | Performed by: FAMILY MEDICINE

## 2024-06-24 PROCEDURE — G0427 INPT/ED TELECONSULT70: HCPCS

## 2024-06-24 PROCEDURE — G0378 HOSPITAL OBSERVATION PER HR: HCPCS

## 2024-06-24 PROCEDURE — 80048 BASIC METABOLIC PNL TOTAL CA: CPT | Performed by: HOSPITALIST

## 2024-06-24 PROCEDURE — 36415 COLL VENOUS BLD VENIPUNCTURE: CPT | Performed by: HOSPITALIST

## 2024-06-24 PROCEDURE — 93306 TTE W/DOPPLER COMPLETE: CPT | Mod: 26 | Performed by: INTERNAL MEDICINE

## 2024-06-24 PROCEDURE — 250N000013 HC RX MED GY IP 250 OP 250 PS 637: Performed by: HOSPITALIST

## 2024-06-24 RX ORDER — ASPIRIN 325 MG
325 TABLET, DELAYED RELEASE (ENTERIC COATED) ORAL DAILY
Qty: 30 TABLET | Refills: 0 | Status: SHIPPED | OUTPATIENT
Start: 2024-06-25

## 2024-06-24 RX ORDER — NICOTINE 21 MG/24HR
1 PATCH, TRANSDERMAL 24 HOURS TRANSDERMAL DAILY
Qty: 28 PATCH | Refills: 0 | Status: SHIPPED | OUTPATIENT
Start: 2024-06-24

## 2024-06-24 RX ORDER — METFORMIN HCL 500 MG
1000 TABLET, EXTENDED RELEASE 24 HR ORAL
Status: DISCONTINUED | OUTPATIENT
Start: 2024-06-24 | End: 2024-06-25 | Stop reason: HOSPADM

## 2024-06-24 RX ORDER — ROSUVASTATIN CALCIUM 10 MG/1
40 TABLET, COATED ORAL EVERY EVENING
Status: DISCONTINUED | OUTPATIENT
Start: 2024-06-24 | End: 2024-06-25 | Stop reason: HOSPADM

## 2024-06-24 RX ADMIN — NICOTINE 1 PATCH: 21 PATCH, EXTENDED RELEASE TRANSDERMAL at 00:20

## 2024-06-24 RX ADMIN — BUPROPION HYDROCHLORIDE 150 MG: 150 TABLET, EXTENDED RELEASE ORAL at 07:45

## 2024-06-24 RX ADMIN — ASPIRIN 325 MG: 325 TABLET ORAL at 07:45

## 2024-06-24 RX ADMIN — CLOPIDOGREL BISULFATE 75 MG: 75 TABLET ORAL at 07:45

## 2024-06-24 RX ADMIN — INSULIN GLARGINE 40 UNITS: 100 INJECTION, SOLUTION SUBCUTANEOUS at 10:11

## 2024-06-24 RX ADMIN — NICOTINE 1 PATCH: 21 PATCH, EXTENDED RELEASE TRANSDERMAL at 17:43

## 2024-06-24 RX ADMIN — LISINOPRIL 40 MG: 10 TABLET ORAL at 07:45

## 2024-06-24 RX ADMIN — ATORVASTATIN CALCIUM 80 MG: 40 TABLET, FILM COATED ORAL at 00:21

## 2024-06-24 RX ADMIN — PERFLUTREN 4 ML: 6.52 INJECTION, SUSPENSION INTRAVENOUS at 12:39

## 2024-06-24 RX ADMIN — ASPIRIN 325 MG: 325 TABLET ORAL at 00:21

## 2024-06-24 ASSESSMENT — ACTIVITIES OF DAILY LIVING (ADL)
ADLS_ACUITY_SCORE: 21
ADLS_ACUITY_SCORE: 36
ADLS_ACUITY_SCORE: 21
DEPENDENT_IADLS:: INDEPENDENT
ADLS_ACUITY_SCORE: 21

## 2024-06-24 NOTE — ED PROVIDER NOTES
Emergency Department Encounter     Evaluation Date & Time:   No admission date for patient encounter.    CHIEF COMPLAINT:  Right sided weakness, Slurred Speech, and Gait Problem      Triage Note:Patient arrives to the ER with c/o right sided weakness, abnormal gait and some slurred speech that started about 1300 this afternoon. History of a stroke. MD called to triage for evaluation.     Triage Assessment (Adult)       Row Name 24          Triage Assessment    Airway WDL WDL        Respiratory WDL    Respiratory WDL WDL        Skin Circulation/Temperature WDL    Skin Circulation/Temperature WDL WDL        Cardiac WDL    Cardiac WDL WDL        Peripheral/Neurovascular WDL    Peripheral Neurovascular WDL WDL        Cognitive/Neuro/Behavioral WDL    Cognitive/Neuro/Behavioral WDL X  right sided weakness, trouble speaking, abnormal gait.                         FINAL IMPRESSION:  No diagnosis found.    Impression and Plan     ED COURSE & MEDICAL DECISION MAKIN:31 PM I spoke to neuroradiologist  8:48 PM I spoke to the radiologist regarding the patient's head CT.  9:00 PM I called SOC.  9:18 PM I spoke to the stroke neurologist.  ED Course as of 24   Sun 20242024 Patient has complaint of feeling of some dysarthria, stumbling mostly towards the right side and feeling like his right side is weak.  He had a stroke just over a year ago that primarily right side but no longer is having symptoms of it for quite some time.  He does still take clopidogrel for blood thinner.  On examination today he does have some ataxia when he attempts to walk but it seems to be on both sides.  There is a little bit more difficulty lifting his right arm than his left but I do not actually appreciate a pronator  activated stroke code tier 2 as onset was at 1pm today.  Per report bs was normal in triage but I do not see it yet in computer.  No other recent trauma or illness to cause these symptoms.  Will  check electrolytes.   2031 Patient's unenhanced CT of the head is no evidence of bleeding but does have evidence of his previous infarct.   2032 His wife told me he had not been taking one of his medications for about a year and on my review of his chart I do see most recent notes to his doctor indicating that it is the atorvastatin that he has not been taking for about a year.   2134 There was concern for possible large vessel occlusion.  Case discussed with the stroke team and they feel that this is artifact and similar to his last CT.  So, continue with stroke code and go to MRI.   2323 Dr. Littlejohn with stroke neurology did review the radiology results of acute stroke.  He does feel the patient can stay here and I would agree as his symptoms are minimal.  Incidentally I did see on reviewing the patient's chart that he had been off of his statin for about a year.  He was admitted to Dr. Soler with the hospitalist service who would like him on cardiac telemetry observation.  Previous hospitalization in February 2022 was reviewed which showed at that time he was on Eliquis and clopidogrel as the patient had stated he was on a stronger blood thinner prior to being on the aspirin and clopidogrel.  I see that was prior to his getting his carotid endarterectomy.    Patient comfortable with the plan.  Neuroevaluation stable.       At the conclusion of the encounter I discussed the results of all the tests and the disposition. The questions were answered. The patient or family acknowledged understanding and was agreeable with the care plan.          60 minutes of critical care time        MEDICATIONS GIVEN IN THE EMERGENCY DEPARTMENT:  Medications   iopamidol (ISOVUE-370) solution 167 mL (has no administration in time range)     And   sodium chloride 0.9 % bag for CT scan flush use (has no administration in time range)       NEW PRESCRIPTIONS STARTED AT TODAY'S ED VISIT:  New Prescriptions    No medications on file        HPI     The patient reports experiencing right-sided weakness after he finished mowing his lawn at 1 PM today, 06/23/2024. The patient's nurse noticed his gait to be off, dropping when walking over. The patient notes that his imbalance is abnormal. His wife adds that the patient is slurring his speech, although the patient does not notice this himself. However, his slurred speech may be due to recent teeth loss. The patient endorses difficulty walking, stating that he is stumbling and falling onto his right side. He denies headaches, neck pain, room spinning sensations, and vision changes. He is currently on the blood thinner Plavix. His wife mentions him stopping a medication due to headaches.     Of note, the patient reports having a stroke about 1 year ago with similar symptoms to now, including lack of right-sided control. He has not experienced as much weakness since. He mentions that he has diabetes.     The history is provided by the patient and the spouse. No  was used.        Jt Venegas is a 58 year old male with a pertinent history of hyperlipidemia, Lt ICA stenosis, H/O Lt MCA infarction, Type 2 diabetes, hypertension, and depression who presents to this ED for evaluation of right-sided weakness.    REVIEW OF SYSTEMS:  Review of Systems   Musculoskeletal:  Positive for gait problem. Negative for neck pain.   Neurological:  Positive for speech difficulty and weakness (Right-sided). Negative for light-headedness and headaches.     remainder of systems are all otherwise negative.        Medical History     Past medical history of hyperlipidemia, Lt ICA stenosis, H/O Lt MCA infarction, Type 2 diabetes, hypertension, and depression.    Past Surgical History:   Procedure Laterality Date    APPENDECTOMY      HERNIA REPAIR      Left CEA         Family History   Problem Relation Age of Onset    Diabetes Mother     Cancer Mother         lung and brain    Cancer Father          melanoma       Social History     Tobacco Use    Smoking status: Every Day     Current packs/day: 0.50     Types: Cigarettes    Smokeless tobacco: Never   Substance Use Topics    Alcohol use: Yes     Comment: one drink every 2 weeks    Drug use: Never       aspirin 81 MG EC tablet  atorvastatin (LIPITOR) 40 MG tablet  blood-glucose meter Misc  buPROPion (WELLBUTRIN XL) 150 MG 24 hr tablet  clopidogrel (PLAVIX) 75 MG tablet  insulin glargine (LANTUS PEN) 100 UNIT/ML pen  lisinopril (ZESTRIL) 40 MG tablet  metFORMIN (GLUCOPHAGE XR) 500 MG 24 hr tablet  rosuvastatin (CRESTOR) 40 MG tablet  sildenafiL (VIAGRA) 50 MG tablet        Physical Exam     First Vitals:  Patient Vitals for the past 24 hrs:   BP Pulse Resp SpO2 Weight   06/23/24 1956 (!) 168/97 99 20 97 % 102.2 kg (225 lb 6.4 oz)       PHYSICAL EXAM:   Constitutional:   Sitting up, Conversing   HENT:  Normocephalic, posterior pharynx wnl, mucous membranes moist and dark pink , front dental extraction wounds healing well  Eyes:  PERRL, EOMI, Conjunctiva normal, No discharge, no scleral icterus.  Respiratory:  Breathing easily, Lungs clear to ascultation   Cardiovascular:  Rate and rhythm nl s1s2 0 murmurs, rubs, or gallops.  Peripheral pulses dp, pt, and radial are wnl.  No peripheral edema   GI:  Bowel sounds normal, Soft, No tenderness, No flank tenderness, nondistended.  :No CVA tenderness.   Musculoskeletal:  Moves all extremities.  No erythematous or swollen major joints.  Integument:  Normal  Neurologic:  Alert & oriented x 3, Ataxia with ambulation, Normal sensory function though c/o subjetive numbness on r arm,  Normal fluid speech speech with sligh tslurring though unclear if due to recent dental extraction  Psychiatric:  Affect normal, Judgment normal, Mood normal.     National Institutes of Health Stroke Scale  Exam Interval: Baseline   Score    Level of consciousness: (0)   Alert, keenly responsive    LOC questions: (0)   Answers both questions  correctly    LOC commands: (0)   Performs both tasks correctly    Best gaze: (0)   Normal    Visual: (0)   No visual loss    Facial palsy: (0)   Normal symmetrical movements    Motor arm (left): (0)   No drift    Motor arm (right): (1)   No drift but a little bit shakey when lifting compared to left    Motor leg (left): (0)   No drift    Motor leg (right): (0)   No drift    Limb ataxia: (2)   Present in two limbs    Sensory: (0)   Normal- no sensory loss    Best language: (0)   Normal- no aphasia    Dysarthria: (0)   Normal    Extinction and inattention: (0)   No abnormality        Total Score:  3       Results     LAB AND RADIOLOGY:  All pertinent labs reviewed and interpreted  No results found for any visits on 06/23/24.      ECG:    Performed at: 06/23/2024 20:52:20    Impression: Sinus tachycardia, Left axis deviation, Abnormal EKG    Rate: 103 bpm  Rhythm: Sinus tachycardia  Axis: -42  HI Interval: 184 ms  QRS Interval: 106 ms  QTc Interval: 471 ms  ST Changes: N/A  Comparison: When compared with EKG of 01/28/2022 00:13, No significant change was found.    I have independently reviewed and interpreted the EKS(s) documented above    PROCEDURES:  Procedures:       SureVisit  System Documentation     Medical Decision Making  Obtained supplemental history:Supplemental history obtained?: Documented in chart  Reviewed external records: External records reviewed?: Documented in chart  Care impacted by chronic illness:Diabetes, Hyperlipidemia, Hypertension, and Mental Health  Care significantly affected by social determinants of health:Access to Medical Care  Did you consider but not order tests?: Work up considered but not performed and documented in chart, if applicable  Did you interpret images independently?: Independent interpretation of ECG and images noted in documentation, when applicable.  Consultation discussion with other provider:Did you involve another provider (consultant, , pharmacy, etc.)?: I  discussed the care with another health care provider, see documentation for details.  Admit.    The creation of this record is based on the scribe s observations of the work being performed by Nichole Mcclain and the provider s statements to them. This document has been checked and approved by MD Ila Lopez MD  Emergency Medicine  [facility] EMERGENCY ROOM         Ila Mccarty MD  06/23/24 9815

## 2024-06-24 NOTE — PROGRESS NOTES
Physical Therapy: Orders received. Chart reviewed and discussed with care team.? Physical Therapy not indicated due to nursing reports pt at mobility baseline and has no mobility concerns.? Defer discharge recommendations to nursing.? Will complete orders.    Kim Lubin, PT, DPT

## 2024-06-24 NOTE — CONSULTS
"[facility]     Stroke Code Note          History of Present Illness     Chief Complaint: Right sided weakness, Slurred Speech, and Gait Problem      Jt Venegas is a 58 year old right-handed male with hx of Dm, HTN, HLD, stroke- 2 years ( left MCA stroke in 2022), he got a left CEA after that stroke. He was found to have a free floating thrombus in the carotid at that time and he was on anticoagulation at that time.     Today he started having right sided ataxia> weakness and gait problems. This started at 1:10 pm today.         Past Medical History     Stroke risk factors: Dm, Htn, HLD    Preadmission antithrombotic regimen:  Plavix and statin.    Modified Morrison Score (Pre-morbid)  0 - No symptoms.                   Assessment and Plan       1.  Stroke     Intravenous Thrombolysis  Not given due to:   - unclear or unfavorable risk-benefit profile for extended window thrombolysis beyond the conventional 4.5 hour time window     Endovascular Treatment  Not initiated due to absence of proximal vessel occlusion     Plan:  - Use orderset: \"Ischemic Stroke Routine Admission\" or \"Ischemic Stroke No Thrombolytics/No Thrombectomy ICU Admission\"  - Place Neurology IP Stroke Consult order   - Neurochecks and Vital Signs every 4 hours   - Permissive HTN; goal SBP < 220 mmHg  - Daily aspirin 325 mg for secondary stroke prevention  - Plavix (clopidogrel) 75 mg PO Daily  - Statin: atorvastatin 80 mg  - MRI Brain with and without contrast    - MRA Brain without contrast  - TTE (with Bubble Study if age 60 yrs or less)  - Telemetry, EKG  - Bedside Glucose Monitoring  - A1c, Lipid Panel, Troponin x 3  - PT/OT/SLP  - Stroke Education  - Euthermia, Euglycemia     ___________________________________________________________________    Carlos Littlejohn MD  ___________________________________________________________________        Imaging/Labs   (personally reviewed )    CT head: no acute IC " changes.  CTA head/neck: no LVO  CT Perfusion head: normal.         Physical Examination     BP: (!) 168/97   Pulse: 99   Resp: 20   Temp: 98  F (36.7  C)   Temp src: Temporal   SpO2: 97 %   O2 Device: None (Room air)   Weight: 102.2 kg (225 lb 6.4 oz)    Wt Readings from Last 2 Encounters:   06/23/24 102.2 kg (225 lb 6.4 oz)   06/06/24 103.6 kg (228 lb 8 oz)       General Exam  General:  patient lying in bed without any acute distress    HEENT:  normocephalic/atraumatic  Cardio:  RRR  Pulmonary:  no respiratory distress  Abdomen:  non-distended  Extremities:  no edema  Skin:  intact     Neuro Exam  Mental Status:  alert, oriented x 3, follows commands, speech clear and fluent, naming and repetition normal  Cranial Nerves:  EOMI with normal smooth pursuit, facial sensation intact and symmetric (tested by nurse), facial movements symmetric, hearing not formally tested but intact to conversation, no dysarthria, shoulder shrug equal bilaterally, tongue protrusion midline  Motor:   right sided hemiparesis    Reflexes:  unable to test (telestroke)  Sensory:  light touch sensation intact and symmetric throughout upper and lower extremities (assessed by nurse)  Coordination:   ataxia in RUE and RLE  Station/Gait:  unable to test due to telestroke        Stroke Scales       NIHSS  1a. Level of Consciousness 0-->Alert, keenly responsive   1b. LOC Questions 0-->Answers both questions correctly   1c. LOC Commands 0-->Performs both tasks correctly   2.   Best Gaze 0-->Normal   3.   Visual 0-->No visual loss   4.   Facial Palsy 0-->Normal symmetrical movements   5a. Motor Arm, Left 0-->No drift, limb holds 90 (or 45) degrees for full 10 secs   5b. Motor Arm, Right 1-->Drift, limb holds 90 (or 45) degrees, but drifts down before full 10 secs, does not hit bed or other support   6a. Motor Leg, Left 0-->No drift, leg holds 30 degree position for full 5 secs   6b. Motor Leg, right 0-->No drift, leg holds 30 degree position for full  5 secs   7.   Limb Ataxia 2-->Present in two limbs   8.   Sensory 0-->Normal, no sensory loss   9.   Best Language 0-->No aphasia, normal   10. Dysarthria 0-->Normal   11. Extinction and Inattention  0-->No abnormality   Total 3 (06/23/24 2050)            Labs     CBC  Lab Results   Component Value Date    HGB 13.0 (L) 06/23/2024    HCT 38.1 (L) 06/23/2024    WBC 10.6 06/23/2024     06/23/2024       BMP  Lab Results   Component Value Date     06/23/2024    POTASSIUM 4.2 06/23/2024    CHLORIDE 106 06/23/2024    CO2 25 06/23/2024    BUN 19.1 06/23/2024    CR 1.75 (H) 06/23/2024     (H) 06/23/2024    LENNY 9.7 06/23/2024       INR  INR   Date Value Ref Range Status   06/23/2024 0.98 0.85 - 1.15 Final   01/28/2022 0.98 0.85 - 1.15 Final       Data   Stroke Code Data  (for stroke code with tele)  Stroke code activated 06/23/24 2032   First stroke provider response 06/23/24 2035   Video start time 06/23/24 2035   Video end time 06/23/24 2130   Last known normal 06/23/24  1310   Time of discovery (or onset of symptoms)  06/23/24  1310   Head CT read by Stroke Neuro Provider 06/23/24 2053   Was stroke code de-escalated? Yes  06/23/24 2052     Telestroke Service Details  Type of service telemedicine diagnostic assessment of acute neurological changes   Reason telemedicine is appropriate patient requires assessment with a specialist for diagnosis and treatment of neurological symptoms   Mode of transmission secure interactive audio and video communication per Kirstin   Originating site (patient location) [facility]    Distant site (provider location) Provider remote site        Clinically Significant Risk Factors Present on Admission                # Drug Induced Platelet Defect: home medication list includes an antiplatelet medication   # Hypertension: Noted on problem list            # DMII: A1C = 6.9 % (Ref range: 0.0 - 5.6 %) within past 6 months    # Obesity: Estimated  "body mass index is 34.48 kg/m  as calculated from the following:    Height as of 6/6/24: 1.722 m (5' 7.8\").    Weight as of this encounter: 102.2 kg (225 lb 6.4 oz).           # CKD, Stage 3b (GFR 30-44): Will monitor and treat as appropriate  - last Cr =  1.75 mg/dL (Ref range: 0.67 - 1.17 mg/dL)  - last GFR = 44 mL/min/1.73m2 (Ref range: >60 mL/min/1.73m2)      Time Spent on this Encounter   Billing: I personally examined and evaluated the patient today. At the time of my evaluation and management the patient was critical condition today due to stroke code. I personally managed stroke code. Key decisions made today included imaging, antithrombotics. I spent a total of 30 minutes providing critical care services, evaluating the patient, directing care and reviewing laboratory values and radiologic reports.   "

## 2024-06-24 NOTE — H&P
[facility] MEDICINE ADMISSION HISTORY AND PHYSICAL     Brief Synopsis:     Jt Venegas is a 58 year old male who presented with complaints of right-sided weakness, slurred speech, difficulty walking.    Medical history is notable for diabetes, hypertension, hyperlipidemia, prior stroke, tobacco use.    Initial evaluation revealed hypertension, creatinine 1.75, troponin of 32, white count 10.6, hemoglobin 13.0, platelets 303.  INR 0.98.  CT head without contrast negative for bleed, CTA head with suspected occlusion of the origin anterior division right middle cerebral artery, neck CTA with approximately 50 to 75% stenosis of the right ICA, left ICA with less than 50% stenosis.  MRI/MRI head with small areas of subacute ischemia lateral left thalamus and subcortical white matter anterior left frontal lobe, head MRA no discrete vessel occlusion, significant stenosis, aneurysm.  EKG was sinus tachycardia.    Assessment and Plan:  Stroke  Has a stroke history 2 years ago as well, left carotid endarterectomy after that  Has been on aspirin and Plavix, statin dose was decreased due to headaches  Appreciate stroke neurology consult  Increase aspirin daily to 325.  Continue Plavix 75 mg daily  Increase atorvastatin 80 mg, monitor for return of headaches  Check echocardiogram, lipids, troponin  Optimize glucose control  Therapy evaluations  Monitor on telemetry    Insulin-dependent diabetes, on Lantus 40 units every morning, metformin 1000 every morning  Essential hypertension, on lisinopril 40 mg daily  Tobacco use, on Wellbutrin  Hyperlipidemia, on 20 mg of atorvastatin, dose decreased due to headache  Chronic kidney disease-Creatinine 1.75 similar to creatinine 2 weeks ago of 1.85      Clinically Significant Risk Factors Present on Admission                # Drug Induced Platelet Defect: home medication list includes an antiplatelet medication   # Hypertension: Noted on problem list   "          # DMII: A1C = 6.9 % (Ref range: 0.0 - 5.6 %) within past 6 months    # Obesity: Estimated body mass index is 34.48 kg/m  as calculated from the following:    Height as of 6/6/24: 1.722 m (5' 7.8\").    Weight as of this encounter: 102.2 kg (225 lb 6.4 oz).                DVTP: Mechanical Prophylaxis/ Sequential Compression Devices  Code Status: Full Code  Disposition: Observation   Diet: Low-salt, diabetic  Fluids: None    Disposition Plan      Expected Discharge Date: 06/24/2024               Chief Complaint Slurred speech, right-sided weakness, difficulty walking     HISTORY   Jt Venegas is a 58 year old male who presented with complaints of neurochanges.    Per ED provider:  The patient reports experiencing right-sided weakness after he finished mowing his lawn at 1 PM today, 06/23/2024. The patient's nurse noticed his gait to be off, dropping when walking over. The patient notes that his imbalance is abnormal. His wife adds that the patient is slurring his speech, although the patient does not notice this himself. However, his slurred speech may be due to recent teeth loss. The patient endorses difficulty walking, stating that he is stumbling and falling onto his right side. He denies headaches, neck pain, room spinning sensations, and vision changes. He is currently on the blood thinner Plavix. His wife mentions him stopping a medication due to headaches.      Of note, the patient reports having a stroke about 1 year ago with similar symptoms to now, including lack of right-sided control. He has not experienced as much weakness since. He mentions that he has diabetes.    Denies fever, chills, chest pain, shortness of breath, abdominal pain, nausea, vomiting, dysuria, lower extremity edema.  Past Medical History     No past medical history on file.     Surgical History     Past Surgical History:   Procedure Laterality Date    APPENDECTOMY      HERNIA REPAIR      Left CEA       Family History  "     Family History   Problem Relation Age of Onset    Diabetes Mother     Cancer Mother         lung and brain    Cancer Father         melanoma      Social History      Social History     Tobacco Use    Smoking status: Every Day     Current packs/day: 0.50     Types: Cigarettes    Smokeless tobacco: Never   Substance Use Topics    Alcohol use: Yes     Comment: one drink every 2 weeks    Drug use: Never      Allergies     Allergies   Allergen Reactions    Hydrochlorothiazide Nausea     Prior to Admission Medications      Prior to Admission Medications   Prescriptions Last Dose Informant Patient Reported? Taking?   aspirin 81 MG EC tablet 6/23/2024 at AM  Yes Yes   Sig: [ASPIRIN 81 MG EC TABLET] Take 81 mg by mouth daily.   atorvastatin (LIPITOR) 40 MG tablet 6/23/2024 at AM  Yes Yes   Sig: Take 20 mg by mouth daily Half-tablet   blood-glucose meter Misc   No No   Sig: [BLOOD-GLUCOSE METER MISC] Use 1 Device As Directed 2 (two) times a day.   buPROPion (WELLBUTRIN XL) 150 MG 24 hr tablet 6/23/2024 at AM  No Yes   Sig: Take 1 tablet (150 mg) by mouth every morning   clopidogrel (PLAVIX) 75 MG tablet 6/23/2024 at AM  No Yes   Sig: Take 1 tablet (75 mg) by mouth daily   insulin glargine (LANTUS PEN) 100 UNIT/ML pen 6/23/2024 at AM  No Yes   Sig: Inject 40 Units Subcutaneous every morning (before breakfast)   lisinopril (ZESTRIL) 40 MG tablet 6/23/2024 at AM  No Yes   Sig: Take 1 tablet (40 mg) by mouth daily   metFORMIN (GLUCOPHAGE XR) 500 MG 24 hr tablet 6/23/2024 at AM  Yes Yes   Sig: Take 1,000 mg by mouth daily (with breakfast)   multivitamin, therapeutic (THERA-VIT) TABS tablet 6/23/2024 at AM  Yes Yes   Sig: Take 1 tablet by mouth daily   rosuvastatin (CRESTOR) 40 MG tablet   No No   Sig: Take 1 tablet (40 mg) by mouth daily      Facility-Administered Medications: None      Review of Systems     A 12 point comprehensive review of systems was negative except as noted above in HPI.    PHYSICAL EXAMINATION     Vitals       Temp:  [98  F (36.7  C)] 98  F (36.7  C)  Pulse:  [] 90  Resp:  [16-26] 23  BP: (144-196)/(86-99) 171/91  SpO2:  [95 %-98 %] 98 %    Examination   Physical Exam:    Gen: no acute distress, comfortable, alert, pleasant, interactive  ENT: no scleral icterus  Pulm: lungs are clear without wheezing, crackles, breathing comfortably at rest  CV: regular rate and rhythm, no significant lower extremity pitting edema  GI: abdomen is soft, non-tender, non-distended with active bowel sounds.  MSK: no obvious deformities of the extremities  Derm: Not pale, no jaundice  Psych: appropriate affect      Pertinent Radiology     Radiology Results:   Recent Results (from the past 24 hour(s))   CT Head w/o Contrast    Narrative    EXAM: CTA HEAD NECK W CONTRAST, CT HEAD W/O CONTRAST, CT HEAD PERFUSION W CONTRAST  LOCATION: [facility]  DATE: 6/23/2024    INDICATION: Right-sided weakness and slurred speech. Code Stroke to evaluate for potential thrombolysis and thrombectomy. PLEASE READ IMMEDIATELY.  COMPARISON: None.  TECHNIQUE: Head and neck CT angiogram with IV contrast. Noncontrast head CT followed by axial helical CT images of the head and neck vessels obtained during the arterial phase of intravenous contrast administration. Axial 2D reconstructed images and   multiplanar 3D MIP reconstructed images of the head and neck vessels were performed by the technologist. Additional CT cerebral perfusion was performed utilizing a second contrast bolus. Perfusion data were post processed with generation of standard   perfusion maps and estimation of ischemic/infarcted volumes utilizing standard threshold values. Dose reduction techniques were used. All stenosis measurements made according to NASCET criteria unless otherwise specified.  CONTRAST: 75ml Isovue 370 (accession [id number]), 75ml Isovue 370 (accession [id number]), 100ml Isovue 370 (accession [id number])    FINDINGS:   NONCONTRAST HEAD CT:    INTRACRANIAL CONTENTS: No intracranial hemorrhage, extraaxial collection, or mass effect.  No CT evidence of acute infarct. Mild presumed chronic small vessel ischemic changes. Chronic left parietal cortical infarct is similar prior. Mild generalized   volume loss. No hydrocephalus.     VISUALIZED ORBITS/SINUSES/MASTOIDS: No intraorbital abnormality. No paranasal sinus mucosal disease. No middle ear or mastoid effusion.    BONES/SOFT TISSUES: No acute abnormality.    HEAD CTA: Evaluation is degraded secondary to suboptimal timing of contrast bolus.  ANTERIOR CIRCULATION: There are nonstenotic atherosclerotic calcifications of bilateral carotid siphons. The middle cerebral artery origins are patent bilaterally. There is suspected occlusion at the proximal M2 anterior division right middle cerebral   artery. Anterior cerebral arteries are widely patent. No aneurysm, or high flow vascular malformation. Standard [place] anatomy.    POSTERIOR CIRCULATION: No stenosis/occlusion, aneurysm, or high flow vascular malformation. Dominant right and smaller left vertebral artery contribute to a normal basilar artery.     DURAL VENOUS SINUSES: Expected enhancement of the major dural venous sinuses.    NECK CTA:  RIGHT CAROTID: Atherosclerotic plaque results in 50-70% stenosis in the right ICA. No dissection.    LEFT CAROTID: Atherosclerotic plaque results in less than 50% stenosis in the left ICA. No dissection.    VERTEBRAL ARTERIES: No focal stenosis or dissection. Dominant right and smaller left vertebral arteries.    AORTIC ARCH: Classic aortic arch anatomy with no significant stenosis at the origin of the great vessels.    NONVASCULAR STRUCTURES: Unremarkable.    CT PERFUSION:  PERFUSION MAPS: There appears to be symmetrical. Perfusion throughout without focal perfusion abnormality. However, Tmax appears diffusely diminished likely secondary to poor timing of contrast bolus.    RAPID ANALYSIS:  CBF<30%: 0  mL  Tmax>6sec: 0 mL  Mismatch volume: 0 mL  Mismatch ratio: None      Impression    IMPRESSION:   HEAD CT:  1.  No CT evidence for acute intracranial process.  2.  Brain atrophy and presumed chronic microvascular ischemic changes as above.    HEAD CTA:  1.  Limited exam secondary to suboptimal timing of contrast bolus.  2.  Suspected occlusion of the origin anterior division right middle cerebral artery.    NECK CTA:  1.  Approximately 50-70% stenosis of the right ICA origin.  2.  Less than 50% stenosis of the left ICA origin.    C PERFUSION:  No focal perfusion abnormality. Question accuracy of the examination secondary to timing of contrast bolus.    Dr. Canales discussed the head CT results with Dr. Mccarty at 8:29 PM and CTA head and neck results in perfusion results with Dr. Mccarty at 8:48 PM central time 6/23/2024.     CTA Head Neck with Contrast    Narrative    EXAM: CTA HEAD NECK W CONTRAST, CT HEAD W/O CONTRAST, CT HEAD PERFUSION W CONTRAST  LOCATION: [facility]  DATE: 6/23/2024    INDICATION: Right-sided weakness and slurred speech. Code Stroke to evaluate for potential thrombolysis and thrombectomy. PLEASE READ IMMEDIATELY.  COMPARISON: None.  TECHNIQUE: Head and neck CT angiogram with IV contrast. Noncontrast head CT followed by axial helical CT images of the head and neck vessels obtained during the arterial phase of intravenous contrast administration. Axial 2D reconstructed images and   multiplanar 3D MIP reconstructed images of the head and neck vessels were performed by the technologist. Additional CT cerebral perfusion was performed utilizing a second contrast bolus. Perfusion data were post processed with generation of standard   perfusion maps and estimation of ischemic/infarcted volumes utilizing standard threshold values. Dose reduction techniques were used. All stenosis measurements made according to NASCET criteria unless otherwise specified.  CONTRAST: 75ml Isovue  370 (accession [id number]), 75ml Isovue 370 (accession [id number]), 100ml Isovue 370 (accession [id number])    FINDINGS:   NONCONTRAST HEAD CT:   INTRACRANIAL CONTENTS: No intracranial hemorrhage, extraaxial collection, or mass effect.  No CT evidence of acute infarct. Mild presumed chronic small vessel ischemic changes. Chronic left parietal cortical infarct is similar prior. Mild generalized   volume loss. No hydrocephalus.     VISUALIZED ORBITS/SINUSES/MASTOIDS: No intraorbital abnormality. No paranasal sinus mucosal disease. No middle ear or mastoid effusion.    BONES/SOFT TISSUES: No acute abnormality.    HEAD CTA: Evaluation is degraded secondary to suboptimal timing of contrast bolus.  ANTERIOR CIRCULATION: There are nonstenotic atherosclerotic calcifications of bilateral carotid siphons. The middle cerebral artery origins are patent bilaterally. There is suspected occlusion at the proximal M2 anterior division right middle cerebral   artery. Anterior cerebral arteries are widely patent. No aneurysm, or high flow vascular malformation. Standard [place] anatomy.    POSTERIOR CIRCULATION: No stenosis/occlusion, aneurysm, or high flow vascular malformation. Dominant right and smaller left vertebral artery contribute to a normal basilar artery.     DURAL VENOUS SINUSES: Expected enhancement of the major dural venous sinuses.    NECK CTA:  RIGHT CAROTID: Atherosclerotic plaque results in 50-70% stenosis in the right ICA. No dissection.    LEFT CAROTID: Atherosclerotic plaque results in less than 50% stenosis in the left ICA. No dissection.    VERTEBRAL ARTERIES: No focal stenosis or dissection. Dominant right and smaller left vertebral arteries.    AORTIC ARCH: Classic aortic arch anatomy with no significant stenosis at the origin of the great vessels.    NONVASCULAR STRUCTURES: Unremarkable.    CT PERFUSION:  PERFUSION MAPS: There appears to be symmetrical. Perfusion throughout without focal perfusion  abnormality. However, Tmax appears diffusely diminished likely secondary to poor timing of contrast bolus.    RAPID ANALYSIS:  CBF<30%: 0 mL  Tmax>6sec: 0 mL  Mismatch volume: 0 mL  Mismatch ratio: None      Impression    IMPRESSION:   HEAD CT:  1.  No CT evidence for acute intracranial process.  2.  Brain atrophy and presumed chronic microvascular ischemic changes as above.    HEAD CTA:  1.  Limited exam secondary to suboptimal timing of contrast bolus.  2.  Suspected occlusion of the origin anterior division right middle cerebral artery.    NECK CTA:  1.  Approximately 50-70% stenosis of the right ICA origin.  2.  Less than 50% stenosis of the left ICA origin.    C PERFUSION:  No focal perfusion abnormality. Question accuracy of the examination secondary to timing of contrast bolus.    Dr. Canales discussed the head CT results with Dr. Mccarty at 8:29 PM and CTA head and neck results in perfusion results with Dr. Mccarty at 8:48 PM central time 6/23/2024.     CT Head Perfusion w Contrast - For Tier 2 Stroke    Narrative    EXAM: CTA HEAD NECK W CONTRAST, CT HEAD W/O CONTRAST, CT HEAD PERFUSION W CONTRAST  LOCATION: [facility]  DATE: 6/23/2024    INDICATION: Right-sided weakness and slurred speech. Code Stroke to evaluate for potential thrombolysis and thrombectomy. PLEASE READ IMMEDIATELY.  COMPARISON: None.  TECHNIQUE: Head and neck CT angiogram with IV contrast. Noncontrast head CT followed by axial helical CT images of the head and neck vessels obtained during the arterial phase of intravenous contrast administration. Axial 2D reconstructed images and   multiplanar 3D MIP reconstructed images of the head and neck vessels were performed by the technologist. Additional CT cerebral perfusion was performed utilizing a second contrast bolus. Perfusion data were post processed with generation of standard   perfusion maps and estimation of ischemic/infarcted volumes utilizing standard  threshold values. Dose reduction techniques were used. All stenosis measurements made according to NASCET criteria unless otherwise specified.  CONTRAST: 75ml Isovue 370 (accession [id number]), 75ml Isovue 370 (accession [id number]), 100ml Isovue 370 (accession [id number])    FINDINGS:   NONCONTRAST HEAD CT:   INTRACRANIAL CONTENTS: No intracranial hemorrhage, extraaxial collection, or mass effect.  No CT evidence of acute infarct. Mild presumed chronic small vessel ischemic changes. Chronic left parietal cortical infarct is similar prior. Mild generalized   volume loss. No hydrocephalus.     VISUALIZED ORBITS/SINUSES/MASTOIDS: No intraorbital abnormality. No paranasal sinus mucosal disease. No middle ear or mastoid effusion.    BONES/SOFT TISSUES: No acute abnormality.    HEAD CTA: Evaluation is degraded secondary to suboptimal timing of contrast bolus.  ANTERIOR CIRCULATION: There are nonstenotic atherosclerotic calcifications of bilateral carotid siphons. The middle cerebral artery origins are patent bilaterally. There is suspected occlusion at the proximal M2 anterior division right middle cerebral   artery. Anterior cerebral arteries are widely patent. No aneurysm, or high flow vascular malformation. Standard [place] of [place] anatomy.    POSTERIOR CIRCULATION: No stenosis/occlusion, aneurysm, or high flow vascular malformation. Dominant right and smaller left vertebral artery contribute to a normal basilar artery.     DURAL VENOUS SINUSES: Expected enhancement of the major dural venous sinuses.    NECK CTA:  RIGHT CAROTID: Atherosclerotic plaque results in 50-70% stenosis in the right ICA. No dissection.    LEFT CAROTID: Atherosclerotic plaque results in less than 50% stenosis in the left ICA. No dissection.    VERTEBRAL ARTERIES: No focal stenosis or dissection. Dominant right and smaller left vertebral arteries.    AORTIC ARCH: Classic aortic arch anatomy with no significant stenosis at the origin of the  great vessels.    NONVASCULAR STRUCTURES: Unremarkable.    CT PERFUSION:  PERFUSION MAPS: There appears to be symmetrical. Perfusion throughout without focal perfusion abnormality. However, Tmax appears diffusely diminished likely secondary to poor timing of contrast bolus.    RAPID ANALYSIS:  CBF<30%: 0 mL  Tmax>6sec: 0 mL  Mismatch volume: 0 mL  Mismatch ratio: None      Impression    IMPRESSION:   HEAD CT:  1.  No CT evidence for acute intracranial process.  2.  Brain atrophy and presumed chronic microvascular ischemic changes as above.    HEAD CTA:  1.  Limited exam secondary to suboptimal timing of contrast bolus.  2.  Suspected occlusion of the origin anterior division right middle cerebral artery.    NECK CTA:  1.  Approximately 50-70% stenosis of the right ICA origin.  2.  Less than 50% stenosis of the left ICA origin.    C PERFUSION:  No focal perfusion abnormality. Question accuracy of the examination secondary to timing of contrast bolus.    Dr. Canales discussed the head CT results with Dr. Mccarty at 8:29 PM and CTA head and neck results in perfusion results with Dr. Mccarty at 8:48 PM central time 6/23/2024.     MR Brain w/o Contrast    Narrative    EXAM: MR BRAIN W/O CONTRAST, MRA BRAIN ([place]) W/O CONTRAST  LOCATION: [facility]  DATE: 6/23/2024    INDICATION: Right-sided weakness and slurred speech.  COMPARISON: 6/23/2024.    TECHNIQUE:   1) Routine multiplanar multisequence head MRI without intravenous contrast.  2) 3D time-of-flight head MRA without intravenous contrast.    FINDINGS:  HEAD MRI:  INTRACRANIAL CONTENTS: On the diffusion-weighted images there is a small focus of ischemia involving the lateral left thalamus along with a few smaller foci of ischemia subcortical white matter mid right frontal lobe. There is no other focus of ischemia   or restricted diffusion. There is no extra-axial fluid collection or acute intraparenchymal hemorrhage. There is no  discrete mass lesion or midline shift. There are appropriate flow voids within the cavernous portions of the internal carotid arteries and   the basilar artery. The areas of ischemia on the diffusion-weighted images are visualized on the FLAIR and T2-weighted images consistent with being subacute in nature. Also on the FLAIR and T2-weighted images are multiple foci of high signal within the   periventricular and subcortical white matter much more prominent on the left side. The ventricular system, basal cisterns and the cortical sulci are within normal limits for the patient's age.    There is no evidence of cerebellar tonsillar ectopia. The corpus callosum and the sella region have appropriate configuration and signal for the patient's age. The corpus callosum and the sella region have appropriate configuration and signal intensity   for the patient's age. The orbit regions are unremarkable. There is no significant paranasal sinus disease. The mastoid air cells and the middle ear regions are clear.     HEAD MRA:   ANTERIOR CIRCULATION: No stenosis/occlusion, aneurysm, or high flow vascular malformation. Standard [place] of [place] anatomy.    POSTERIOR CIRCULATION: No stenosis/occlusion, aneurysm, or high flow vascular malformation. Right vertebral artery dominant but both vertebral arteries connect up to the basilar artery.       Impression    IMPRESSION:  HEAD MRI:   1.  Small areas subacute ischemia lateral left thalamus and subcortical white matter anterior left frontal lobe.  2.  No discrete hemorrhage or midline shift.  3.  Diffuse small vessel ischemic disease much more prominent on the left side. Recommend clinical correlation with left carotid artery disease.    HEAD MRA:   1.  No discrete vessel occlusion, significant stenosis, aneurysm or high flow vascular malformation involving the arteries of the [place].    2.  Findings were communicated to Dr. Mccarty at 11:03 pm on 6/23/2024   MRA Brain  ([place]) wo Contrast    Narrative    EXAM: MR BRAIN W/O CONTRAST, MRA BRAIN ([place]) W/O CONTRAST  LOCATION: [facility]  DATE: 6/23/2024    INDICATION: Right-sided weakness and slurred speech.  COMPARISON: 6/23/2024.    TECHNIQUE:   1) Routine multiplanar multisequence head MRI without intravenous contrast.  2) 3D time-of-flight head MRA without intravenous contrast.    FINDINGS:  HEAD MRI:  INTRACRANIAL CONTENTS: On the diffusion-weighted images there is a small focus of ischemia involving the lateral left thalamus along with a few smaller foci of ischemia subcortical white matter mid right frontal lobe. There is no other focus of ischemia   or restricted diffusion. There is no extra-axial fluid collection or acute intraparenchymal hemorrhage. There is no discrete mass lesion or midline shift. There are appropriate flow voids within the cavernous portions of the internal carotid arteries and   the basilar artery. The areas of ischemia on the diffusion-weighted images are visualized on the FLAIR and T2-weighted images consistent with being subacute in nature. Also on the FLAIR and T2-weighted images are multiple foci of high signal within the   periventricular and subcortical white matter much more prominent on the left side. The ventricular system, basal cisterns and the cortical sulci are within normal limits for the patient's age.    There is no evidence of cerebellar tonsillar ectopia. The corpus callosum and the sella region have appropriate configuration and signal for the patient's age. The corpus callosum and the sella region have appropriate configuration and signal intensity   for the patient's age. The orbit regions are unremarkable. There is no significant paranasal sinus disease. The mastoid air cells and the middle ear regions are clear.     HEAD MRA:   ANTERIOR CIRCULATION: No stenosis/occlusion, aneurysm, or high flow vascular malformation. Standard  [place] of [place] anatomy.    POSTERIOR CIRCULATION: No stenosis/occlusion, aneurysm, or high flow vascular malformation. Right vertebral artery dominant but both vertebral arteries connect up to the basilar artery.       Impression    IMPRESSION:  HEAD MRI:   1.  Small areas subacute ischemia lateral left thalamus and subcortical white matter anterior left frontal lobe.  2.  No discrete hemorrhage or midline shift.  3.  Diffuse small vessel ischemic disease much more prominent on the left side. Recommend clinical correlation with left carotid artery disease.    HEAD MRA:   1.  No discrete vessel occlusion, significant stenosis, aneurysm or high flow vascular malformation involving the arteries of the [place].    2.  Findings were communicated to Dr. Mccarty at 11:03 pm on 6/23/2024     EKG Results: personally reviewed.  Sinus tachycardia.    Kiel Soler [facility]   Phone: #[phone]

## 2024-06-24 NOTE — UTILIZATION REVIEW
Concurrent stay review; Secondary Review Determination     Under the authority of the Utilization Management Committee, the utilization review process indicated a secondary review on Jt Venegas.  The review outcome is based on review of the medical records, discussions with staff, and applying clinical experience noted on the date of the review.        (x) Observation Status Appropriate - Concurrent stay review    RATIONALE FOR DETERMINATION   Jt Venegas is a 58 year old male PMH significant for left CVA 2022, left carotid endarterectomy, IDDM 2, hyperlipidemia, HTN, CKD 3B, GERD, active tobacco use and depression.  6/23 at 1300 hrs. was noting right-sided weakness and slurred speech. CT head without contrast negative for bleed, CTA head with suspected occlusion of the origin anterior division right middle cerebral artery.  Neck CTA with approximately 50 to 75% stenosis of the right ICA, left ICA with less than 50% stenosis.  MRI/MRA head with small areas of subacute ischemia lateral left thalamus and subcortical white matter anterior left frontal lobe, head MRA no discrete vessel occlusion, significant stenosis, nor aneurysm. Symptoms have resolved.  ECHO without acute abnormalities.  Anticipate ability to discharge soon.    Patient is clinically improving and there is no clear indication to change patient's status to inpatient. The severity of illness, intensity of service provided, expected LOS and risk for adverse outcome make the care appropriate for observation.    The information on this document is developed by the utilization review team in order for the business office to ensure compliance.  This only denotes the appropriateness of proper admission status and does not reflect the quality of care rendered.         The definitions of Inpatient Status and Observation Status used in making the determination above are those provided in the CMS Coverage Manual, Chapter 1 and Chapter 6, section 70.4.       Sincerely,   Becky Sy MD  Utilization Review  Physician Advisor  [facility]

## 2024-06-24 NOTE — PROGRESS NOTES
Pharmacy Consult to evaluate for medication related stroke core measures    Jt Venegas, 58 year old male admitted for right-sided weakness, slurred speech, difficulty walking on 6/23/2024.    Thrombolytic was not given because of Time from onset contraindications    VTE Prophylaxis  SCDs ordered     Antithrombotic: aspirin and clopidogrel started on 6/24, as appropriate by end of hospital day 2. Continue antithrombotic therapy on discharge to meet quality measures, unless contraindicated. Patient on both aspirin and clopidogrel (home meds) and both reported as taken on day of admission, 6/23. Aspirin dose increased from 81 mg to 325 mg daily inpatient.     Anticoagulation if history of A-fib/flutter: Patient does not have history of A-fib/flutter - anticoagulation not required for medication related stroke core measures.     LDL Cholesterol Calculated   Date Value Ref Range Status   06/23/2024 128 (H) <=100 mg/dL Final     LDL Cholesterol Direct   Date Value Ref Range Status   04/04/2019 182 (H) <=129 mg/dl Final       Patient's home statin, Crestor (rosuvastatin) restarted; continue statin on discharge to meet quality measures, unless contraindicated.     Recommendations:  Document placement of SCDs by end of hospital day 2.     Thank you for the consult.    Belkis Nino [facility] 6/24/2024 5:30 PM

## 2024-06-24 NOTE — ED TRIAGE NOTES
Patient arrives to the ER with c/o right sided weakness, abnormal gait and some slurred speech that started about 1300 this afternoon. History of a stroke. MD called to triage for evaluation.     Triage Assessment (Adult)       Row Name 06/23/24 1957          Triage Assessment    Airway WDL WDL        Respiratory WDL    Respiratory WDL WDL        Skin Circulation/Temperature WDL    Skin Circulation/Temperature WDL WDL        Cardiac WDL    Cardiac WDL WDL        Peripheral/Neurovascular WDL    Peripheral Neurovascular WDL WDL        Cognitive/Neuro/Behavioral WDL    Cognitive/Neuro/Behavioral WDL X  right sided weakness, trouble speaking, abnormal gait.

## 2024-06-24 NOTE — PLAN OF CARE
Goal Outcome Evaluation:         Pt. Rested overnight without recurrence of stroke symptoms. VSS. Able to ambulate to BR without problem, tolerating fluid no swallowing difficulties. . NSR on tele monitor. Continue to monitor.

## 2024-06-24 NOTE — ED NOTES
[place] per Neuro MD per Stroke timers tab at 2052. Plan for MRI and stroke protocols, Neuro Checks every 4 hours. Primary RN to continue to monitor    MELCHOR CAMPOS RN

## 2024-06-24 NOTE — PLAN OF CARE
Speech Therapy    SLP orders received. Chart reviewed and case discussed with care team.?SLP briefly met with patient at bedside. He reports that he is tolerating his current diet of Regular textures and thin liquids without difficulty chewing or swallowing. RN confirms no clinical s/sx aspiration observed. Patient's speech is clear (i.e., no dysarthria noted). He denies word-finding difficulty/error or issues with auditory comprehension. He appears to be functioning at his cognitive-linguistic baseline. No acute SLP needs identified.? Will defer Clinical Swallow Evaluation and Speech-Language Evaluation and complete current orders at this time.

## 2024-06-24 NOTE — ED NOTES
Pt at MRI. Removed jewelry (necklace and ring) given to patient significant other. Doctor Lul reports not needing 1:1 for imaging since de escalated.

## 2024-06-24 NOTE — PLAN OF CARE
Problem: Adult Inpatient Plan of Care  Goal: Optimal Comfort and Wellbeing  Outcome: Progressing  Intervention: Monitor Pain and Promote Comfort  Recent Flowsheet Documentation  Taken 6/24/2024 0008 by Wilda Cotter RN  Pain Management Interventions: declines   Goal Outcome Evaluation:       Pt. Will rest tonight with minimal rest and sleep interruptions.

## 2024-06-24 NOTE — CONSULTS
[facility] [place]    Stroke Consult Note    Reason for Consult:  Stroke    Chief Complaint: Right sided weakness, Slurred Speech, and Gait Problem     HPI  Jt Venegas is a right handed 58 year old male with a PMH significant for HTN, DM, HLD, hx of stroke in 2022 (presented with right hand/arm weakness found to have Left partial lobe infarct, L ICA 90% stenosis with thrombus (placed on Eliquis and thrombus resolved) s/p left CEA, reports residual right arm numbness and decreased dexterity Jt presented on 6/23 with right sided ataxia, weakness, and gait difficulty.  Jt reports that yesterday after mowing the lawn and when attempting to walk inside he noted new right foot weakness/dragging.  He then attempted to make a sandwich for lunch and ended up dropping his food on the ground due to new right hand weakness.  His wife and son also noted new slurred speech and word finding difficulty.  Of note, Jt did recently have a few of his front teeth removed 3 to 4 days ago.  He denied any associated vision changes.  He reports residual right sided numbness that increased in severity yesterday.  In the ED, CT head was negative for hemorrhage, CTA head and neck revealed right ICA stenosis 50 to 70% and less than 50% stenosis of the left ICA.  MRA brain confirmed no LVO.  MRI brain revealed left lateral thalamic infarct and subcortical left frontal lobe infarct.  Presenting blood pressure was 168/97. Was taking aspirin 81 and Plavix 75 mg PTA.     Today, Jt reports his symptoms have improved, specially his right hand dexterity has improved.  He reports residual numbness from his prior stroke which is still present today.  He was able to walk to the bathroom without symptoms earlier this morning.  He has not yet worked with physical therapy or occupational therapy.  He notes at baseline he has left toe numbness.    Reviewed Vascular Risk Factors:   - Tobacco use: Current tobacco  "use, smokes 5-10 cigs per day since his prior stroke. \"Quits every now then\" - went 4 days last month without smoking.  He reports it is difficult to quit as his spouse also smokes tobacco  - Family history of stroke/TIA: None  - Migraine: None   - Diet: Eating out for lunch (pizza ranch, McDonalds), sandwich, \"junk food\", chicken and pulled pork  -Caffeine drinks: Coffee - one cup per day, drinks 5 hour energies drinks, monsters 1-2 a week, Soda/pop 1 per day   - Exercise: Walking at work, climbing, raking, works in [place], walks the dogs once a day    - Blood pressure: Known hx history, not checking at home but has a BP cuff   - Diabetes: Known hx, checks 1-2 a week, takes 40 units of insulin in the morning, is aware of hypoglycemia periods   - Atrial fibrillation: Father had ?a-fib, no personal history   - B symptoms: Denies recent fevers, chills, unintentional weight loss, fatigue, night sweats.  - Pre-cancer screenings: Not up to date  - TYREL Screening questions: Endorse snoring and daytime sleepiness, denies apneic events  - Depression/anxiety: Jt reports that he becomes easily frustrated when he is unable to do/perform tasks that he was able to do prior to his stroke.    Stroke Evaluation Summarized  MRI/Head CT MRI Brain: Small area of subacute infarct in the lateral left thalamus and subcortical white matter anterior left frontal lobe   CTH: No evidence of hemorrhage.     Intracranial Vasculature MRA Brain: No discrete vessel occlusion, significant stenosis, aneurysm or high flow vascular malformation involving the arteries of the [place]   CTA Head: Limited exam secondary to suboptimal timing of contrast bolus   Cervical Vasculature CTA Neck: Approximately 50-70% stenosis of the right ICA origin. Less than 50% stenosis of the left ICA origin    CTP No focal perfusion abnormality. Question accuracy of the examination secondary to timing of contrast bolus.      Echocardiogram Left " ventricular size, wall motion and function are normal. EF is 55-60%.Normal right ventricle size and systolic function. The left atrium is mildly dilated. Bubble study is negative.The study was technically difficult. No regional wall motion abnormalities   EKG/Telemetry EKG results not in Epic tab but per ED MD note:  Sinus tachycardia, Left axis deviation    Other Testing Not Applicable      LDL  6/6/2024: 204 mg/dL   A1C  6/6/2024: 6.9 %   Troponin 6/23/2024: 34 ng/L     Impression  Acute ischemic stroke of left thalamus and left frontal lobe due to small-vessel occlusion vs embolic stroke of undetermined source (ESUS)    Recommendations   Acute stroke management  - Neuro checks and vital signs every 4 hours  - SBP < 180  - PT/OT/ST consults  - Bedside Glucose Monitoring  - Euthermia, Euglycemia     Diagnostic testing  - Continue telemetry while inpatient    Secondary stroke prevention  - Continue Plavix 75 mg and aspirin 81mg daily together for 21 days; then aspirin 325 mg alone indefinitely  -  (goal 40-70); initiate Crestor 40 mg with ongoing outpatient titration to achieve LDL goal    - Messaged by Jt's PCP regarding his hx of headaches while taking Lipitor will transition to Crestor  - Long term goal BP is <130/80 to be achieved as outpatient within several weeks. Tighter control associated with improved vascular outcomes; recommend home blood pressure monitoring and keeping a BP log for primary care follow up  - Blood glucose monitoring, Hgb A1c 6.9%, (goal <7% for secondary stroke prevention)  - Encourage Mediterranean diet and daily exercise  - Encouraged Smoking Cessation   - Education: Reviewed BEFAST stroke warning signs     Patient Follow-up    - in the next 1-2 week(s) with PCP    - Discuss consideration for mental health referral   - in 6-8 weeks with general neurology or stroke KATIE ([phone]) (ordered)  - Continue to follow with your established vascular surgery team   - 15 day  "cardiac event monitor (Zio Patch) to be mailed to patient, (ordered)   - Referral to sleep medicine to evaluate for TYREL (ordered)    No further stroke workup is recommended, we will sign off. Please contact us for additional questions or concerns.    Monica Ramirez PA-C  Vascular Neurology    To page me or covering stroke neurology team member, click here: AMCOM  Choose \"On Call\" tab at top, then select \"NEUROLOGY/ALL SITES\" from middle drop-down box, press Enter, then look for \"stroke\" or \"telestroke\" for your site.  _____________________________________________________    Clinically Significant Risk Factors Present on Admission                # Drug Induced Platelet Defect: home medication list includes an antiplatelet medication   # Hypertension: Noted on problem list            # DMII: A1C = 6.9 % (Ref range: 0.0 - 5.6 %) within past 6 months    # Obesity: Estimated body mass index is 34.48 kg/m  as calculated from the following:    Height as of 6/6/24: 1.722 m (5' 7.8\").    Weight as of this encounter: 102.2 kg (225 lb 6.4 oz).           # CKD, Stage 3a (GFR 45-59): Will monitor and treat as appropriate  - last Cr =  1.52 mg/dL (Ref range: 0.67 - 1.17 mg/dL)  - last GFR = 52 mL/min/1.73m2 (Ref range: >60 mL/min/1.73m2)        Past Medical History    History reviewed. No pertinent past medical history.  Medications   Home Meds  Prior to Admission medications    Medication Sig Start Date End Date Taking? Authorizing Provider   aspirin 81 MG EC tablet [ASPIRIN 81 MG EC TABLET] Take 81 mg by mouth daily. 4/4/19  Yes Provider, Historical   atorvastatin (LIPITOR) 40 MG tablet Take 20 mg by mouth daily Half-tablet   Yes Unknown, Entered By History   buPROPion (WELLBUTRIN XL) 150 MG 24 hr tablet Take 1 tablet (150 mg) by mouth every morning 5/17/24  Yes Kylie Armijo MD   clopidogrel (PLAVIX) 75 MG tablet Take 1 tablet (75 mg) by mouth daily 5/17/24  Yes Kylie Armijo MD   insulin glargine (LANTUS PEN) " 100 UNIT/ML pen Inject 40 Units Subcutaneous every morning (before breakfast) 6/6/24  Yes Kylie Armijo MD   lisinopril (ZESTRIL) 40 MG tablet Take 1 tablet (40 mg) by mouth daily 6/6/24  Yes Kylie Armijo MD   metFORMIN (GLUCOPHAGE XR) 500 MG 24 hr tablet Take 1,000 mg by mouth daily (with breakfast)   Yes Unknown, Entered By History   multivitamin, therapeutic (THERA-VIT) TABS tablet Take 1 tablet by mouth daily   Yes Unknown, Entered By History   blood-glucose meter Misc [BLOOD-GLUCOSE METER MISC] Use 1 Device As Directed 2 (two) times a day. 2/20/15   Kylie Armijo MD   rosuvastatin (CRESTOR) 40 MG tablet Take 1 tablet (40 mg) by mouth daily 6/14/24   Kylie Armijo MD       Scheduled Meds  Current Facility-Administered Medications   Medication Dose Route Frequency Provider Last Rate Last Admin    aspirin (ASA) EC tablet 325 mg  325 mg Oral Daily Kiel Soler,    325 mg at 06/24/24 0021    atorvastatin (LIPITOR) tablet 20 mg  20 mg Oral Daily Kiel Soler DO        atorvastatin (LIPITOR) tablet 80 mg  80 mg Oral QPM Kiel Soler,    80 mg at 06/24/24 0021    buPROPion (WELLBUTRIN XL) 24 hr tablet 150 mg  150 mg Oral QAM Kiel Soler DO        clopidogrel (PLAVIX) tablet 75 mg  75 mg Oral Daily Kiel Soler DO        insulin aspart (NovoLOG) injection (RAPID ACTING)  1-7 Units Subcutaneous TID AC Kiel Soler DO        insulin aspart (NovoLOG) injection (RAPID ACTING)  1-5 Units Subcutaneous At Bedtime Kiel Soler DO        insulin glargine (LANTUS PEN) injection 40 Units  40 Units Subcutaneous QAM AC Kiel Soler DO        lisinopril (ZESTRIL) tablet 40 mg  40 mg Oral Daily Kiel Soler DO        nicotine (NICODERM CQ) 21 MG/24HR 24 hr patch 1 patch  1 patch Transdermal Daily Kiel Soler,    1 patch at 06/24/24 0020    sodium chloride (PF) 0.9% PF flush 3 mL  3 mL Intracatheter Q8H Kiel Soler, DO   3 mL at 06/24/24 0022       Infusion Meds  Current  Facility-Administered Medications   Medication Dose Route Frequency Provider Last Rate Last Admin       Allergies   Allergies   Allergen Reactions    Hydrochlorothiazide Nausea          PHYSICAL EXAMINATION   Temp:  [97.7  F (36.5  C)-98  F (36.7  C)] 97.7  F (36.5  C)  Pulse:  [] 83  Resp:  [16-26] 20  BP: (144-196)/(76-99) 168/76  SpO2:  [95 %-98 %] 95 %    General Exam  General:  patient lying in bed without any acute distress    HEENT:  normocephalic/atraumatic, 4 top front teeth are missing which could be contributing to dysarthria  Pulmonary:  no respiratory distress    Neuro Exam  Mental Status:  alert, oriented to age, month, follows commands, speech clear with intermittent dysarthria, naming and repetition normal  Cranial Nerves:  visual fields intact (tested by nurse), EOMI with normal smooth pursuit, facial sensation intact and symmetric (tested by nurse), facial movements symmetric, hearing not formally tested but intact to conversation, left shoulder shrug > R no dysarthria, tongue protrusion midline  Motor:  no abnormal movements, able to move all limbs antigravity spontaneously with no signs of hemiparesis observed, no pronator drift   Reflexes:  unable to test (telestroke)  Sensory:  light touch sensation intact and symmetric throughout upper and lower extremities (assessed by nurse), no extinction on double simultaneous stimulation (assessed by nurse)  Coordination:  normal finger-to-nose bilaterally, ataxia present with right heel-to-shin testing, and left heel-to-shin without dysmetria  Station/Gait:  unable to test due to telestroke    Stroke Scales  NIHSS  1a. Level of Consciousness 0-->Alert, keenly responsive   1b. LOC Questions 0-->Answers both questions correctly   1c. LOC Commands 0-->Performs both tasks correctly   2.   Best Gaze 0-->Normal   3.   Visual 0-->No visual loss   4.   Facial Palsy 0-->Normal symmetrical movements   5a. Motor Arm, Left 0-->No drift, limb holds 90 (or 45)  "degrees for full 10 secs   5b. Motor Arm, Right 0-->No drift, limb holds 90 (or 45) degrees for full 10 secs   6a. Motor Leg, Left 0-->No drift, leg holds 30 degree position for full 5 secs   6b. Motor Leg, right 0-->No drift, leg holds 30 degree position for full 5 secs   7.   Limb Ataxia 1-->Present in one limb (RLE)   8.   Sensory 0-->Normal, no sensory loss   9.   Best Language 0-->No aphasia, normal   10. Dysarthria 1-->Mild-to-moderate dysarthria, patient slurs at least some words and, at worst, can be understood with some difficulty   11. Extinction and Inattention  0-->No abnormality   Total 2 (06/24/24 1104)       Imaging  I personally reviewed all imaging; relevant findings per HPI.    Labs Data   CBC  Recent Labs   Lab 06/24/24  0552 06/23/24 2026   WBC 9.4 10.6   RBC 4.27* 4.22*   HGB 13.0* 13.0*   HCT 38.5* 38.1*    303     Basic Metabolic Panel   Recent Labs   Lab 06/24/24  0552 06/24/24  0012 06/23/24 2104 06/23/24 2026     --   --  141   POTASSIUM 4.3  --   --  4.2   CHLORIDE 107  --   --  106   CO2 24  --   --  25   BUN 16.9  --   --  19.1   CR 1.52*  --   --  1.75*   * 114* 114* 132*   LENNY 9.6  --   --  9.7     Liver Panel  No results for input(s): \"PROTTOTAL\", \"ALBUMIN\", \"BILITOTAL\", \"ALKPHOS\", \"AST\", \"ALT\", \"BILIDIRECT\" in the last 168 hours.  INR    Recent Labs   Lab Test 06/23/24 2026 01/28/22  0101   INR 0.98 0.98           Stroke Consult Data Data   Telestroke Service Details  (for non-emergent stroke consult with tele)  Video start time 06/23/24 2035   Video end time 06/23/24 2130   Type of service telemedicine diagnostic assessment of acute neurological changes   Reason telemedicine is appropriate patient requires assessment with a specialist for diagnosis and treatment of neurological symptoms   Mode of transmission secure interactive audio and video communication per Kirstin   Originating site (patient location) [facility]" site (provider location) [facility] [place]       I have personally spent a total of 80 minutes providing care today, time spent in reviewing medical records and devising the plan as recorded above.

## 2024-06-24 NOTE — PHARMACY-ADMISSION MEDICATION HISTORY
Pharmacist Admission Medication History    Admission medication history is complete. The information provided in this note is only as accurate as the sources available at the time of the update.    Information Source(s): Patient, Family member, and CareEverywhere/SureScripts via in-person    Pertinent Information: Patient is prescribed atorvastatin 40 mg daily but was getting headaches so has decreased to 20 mg daily recently. There is also a recent prescription for rosuvastatin which may have been ordered to replace atorvastatin but unsure since there are no notes in the chart about the change.     Changes made to PTA medication list:  Added: MVI  Deleted: sildenafil  Changed: metformin, atorvastatin    Allergies reviewed with patient and updates made in EHR: yes    Medication History Completed By: David Tran [facility] 6/23/2024 9:14 PM    PTA Med List   Medication Sig Note Last Dose    aspirin 81 MG EC tablet [ASPIRIN 81 MG EC TABLET] Take 81 mg by mouth daily.  6/23/2024 at AM    atorvastatin (LIPITOR) 40 MG tablet Take 20 mg by mouth daily Half-tablet 6/23/2024: Reduced from 40 mg due to headache. 6/23/2024 at AM    buPROPion (WELLBUTRIN XL) 150 MG 24 hr tablet Take 1 tablet (150 mg) by mouth every morning  6/23/2024 at AM    clopidogrel (PLAVIX) 75 MG tablet Take 1 tablet (75 mg) by mouth daily  6/23/2024 at AM    insulin glargine (LANTUS PEN) 100 UNIT/ML pen Inject 40 Units Subcutaneous every morning (before breakfast)  6/23/2024 at AM    lisinopril (ZESTRIL) 40 MG tablet Take 1 tablet (40 mg) by mouth daily  6/23/2024 at AM    metFORMIN (GLUCOPHAGE XR) 500 MG 24 hr tablet Take 1,000 mg by mouth daily (with breakfast) 6/23/2024: Prescribed 4 tablets daily 6/23/2024 at AM    multivitamin, therapeutic (THERA-VIT) TABS tablet Take 1 tablet by mouth daily  6/23/2024 at AM

## 2024-06-24 NOTE — DISCHARGE SUMMARY
"[facility] MEDICINE  DISCHARGE SUMMARY     Primary Care Physician: Kylie Armijo  Admission Date: 6/23/2024   Discharge Provider: Himanshu Nichols MD Discharge Date: 6/24/2024    Diet:   Active Diet and Nourishment Order   Procedures    Moderate Consistent Carb (60 g CHO per Meal) Diet    Diet     Code Status: Full Code   Activity: DCACTIVITY: Activity as tolerated        Condition at Discharge: Stable     REASON FOR PRESENTATION(See Admission Note for Details)   Garbled speech and right-sided weakness    PRINCIPAL & ACTIVE DISCHARGE DIAGNOSES     Principal Problem:    Acute CVA (cerebrovascular accident) (H)  Active Problems:    Hypertension    Hyperlipidemia    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    Weight as of this encounter: 102.2 kg (225 lb 6.4 oz).              PENDING LABS     Unresulted Labs Ordered in the Past 30 Days of this Admission       No orders found for last 31 day(s).          PROCEDURES ( this hospitalization only)      None    RECOMMENDATIONS TO OUTPATIENT PROVIDER FOR F/U VISIT   Follow-up Appointments     Follow-up and recommended labs and tests       Patient Follow-up    - in the next 1-2 week(s) with PCP               - Discuss consideration for mental health referral   - in 6-8 weeks with general neurology or stroke KATIE ([phone])   (ordered)  - Continue to follow with " your established vascular surgery team   - 15 day cardiac event monitor (Zio Patch) to be mailed to patient,   (ordered)   - Referral to sleep medicine to evaluate for TYREL (ordered)  -Recheck lipid and hepatic panel in 1 month.              DISPOSITION     Home    SUMMARY OF HOSPITAL COURSE:      Jt Venegas is a 58 year old male PMH significant for left CVA 2022, left carotid endarterectomy, IDDM 2, hyperlipidemia, HTN, CKD 3B, GERD, active tobacco use and depression.  6/23 at 1300 hrs. was noting right-sided weakness and slurred speech.  Had difficulty with his gait and mowing the lawn.  Presented to the ED at 1945 hrs.  Stroke code activated. CT head without contrast negative for bleed, CTA head showed suspected occlusion of the origin anterior division right middle cerebral artery.  Neck CTA with approximately 50 to 75% stenosis of the right ICA, left ICA with less than 50% stenosis.  MRI/MRA head with small areas of subacute ischemia lateral left thalamus and subcortical white matter anterior left frontal lobe, head MRA no discrete vessel occlusion, significant stenosis, nor aneurysm.  EKG was sinus tachycardia.  Admitted.      1.  Neurologic.  Appreciate stroke neurology recommendations.  6/24 patient's symptoms have resolved.  Echocardiogram was unremarkable LVEF 55 to 60%.  Negative bubble.  No other abnormalities noted.  TSH normal.  Patient cleared for discharge by telemetry neurology.  Close follow-up with primary and neurology recommended.  Will be going out on full-strength aspirin with Plavix.  Critical importance of seeking medical attention ASAP with any neurologic symptoms discussed at length with the patient.    2.  Hyperlipidemia.  6/6/2024 patient's cholesterol was 297,  HDL 48 triglycerides 225.  Primary had started him on Crestor 40 mg daily.  Cholesterol check here in the hospital showed total cholesterol 198, , triglycerides 162 and HDL 38.  Continue on his Crestor and  recheck lipid and hepatic panel in 1 month.    3.  Cardiovascular.  Patient echocardiogram normal as described above.  Do recommend outpatient Holter monitor as patient has been complaining of some fatigue and potential arrhythmia could explain his CVA.  Orders placed for outpatient monitor.    4.  Nicotine dependence.  Patient still smoking about a half a pack per day.  Critical importance of both he and his wife quitting smoking discussed with patient.  Provided nicotine patches at discharge.    5.  Diabetes.  A1c was 6.9.  Patient's glucose was elevated during his stay but his metformin was held due to his multiple studies.  Will resume his Lantus and metformin at discharge.  Should be following his glucose readings closely.      Discharge Medications with Med changes:     Current Discharge Medication List        START taking these medications    Details   nicotine (NICODERM CQ) 21 MG/24HR 24 hr patch Place 1 patch onto the skin daily  Qty: 28 patch, Refills: 0    Associated Diagnoses: Tobacco dependence syndrome           CONTINUE these medications which have CHANGED    Details   aspirin (ASA) 325 MG EC tablet Take 1 tablet (325 mg) by mouth daily  Qty: 30 tablet, Refills: 0    Associated Diagnoses: Acute CVA (cerebrovascular accident) (H)           CONTINUE these medications which have NOT CHANGED    Details   buPROPion (WELLBUTRIN XL) 150 MG 24 hr tablet Take 1 tablet (150 mg) by mouth every morning  Qty: 90 tablet, Refills: 1    Associated Diagnoses: Major depressive disorder, single episode, moderate (H)      clopidogrel (PLAVIX) 75 MG tablet Take 1 tablet (75 mg) by mouth daily  Qty: 90 tablet, Refills: 4    Associated Diagnoses: Acute ischemic cerebrovascular accident (CVA) involving left middle cerebral artery territory (H)      insulin glargine (LANTUS PEN) 100 UNIT/ML pen Inject 40 Units Subcutaneous every morning (before breakfast)  Qty: 45 mL, Refills: 11    Comments: If Lantus is not covered by  insurance, may substitute Basaglar or Semglee or other insulin glargine product per insurance preference at same dose and frequency.    Associated Diagnoses: Type 2 diabetes mellitus with diabetic polyneuropathy, with long-term current use of insulin (H)      lisinopril (ZESTRIL) 40 MG tablet Take 1 tablet (40 mg) by mouth daily  Qty: 90 tablet, Refills: 1    Comments: Increased dose  Associated Diagnoses: Essential hypertension      metFORMIN (GLUCOPHAGE XR) 500 MG 24 hr tablet Take 1,000 mg by mouth daily (with breakfast)      multivitamin, therapeutic (THERA-VIT) TABS tablet Take 1 tablet by mouth daily      blood-glucose meter Misc [BLOOD-GLUCOSE METER MISC] Use 1 Device As Directed 2 (two) times a day.  Qty: 1 each, Refills: 0    Comments: Please replace with formulary-preferred meter if needed  Associated Diagnoses: Type II or unspecified type diabetes mellitus without mention of complication, not stated as uncontrolled      rosuvastatin (CRESTOR) 40 MG tablet Take 1 tablet (40 mg) by mouth daily  Qty: 90 tablet, Refills: 4    Comments: Replacing atorvastatin  Associated Diagnoses: Hyperlipidemia, unspecified hyperlipidemia type           STOP taking these medications       atorvastatin (LIPITOR) 40 MG tablet Comments:   Reason for Stopping:                 Rationale for medication changes:      Nicotine patch for smoking cessation.  Aspirin increased to 325 mg daily due to CVA.      Consults     NEUROLOGY IP STROKE CONSULT  SPEECH LANGUAGE PATH ADULT IP CONSULT  PHARMACY IP CONSULT  PHARMACY IP CONSULT  PHARMACY IP CONSULT  PHYSICAL THERAPY ADULT IP CONSULT  OCCUPATIONAL THERAPY ADULT IP CONSULT  REHAB ADMISSIONS LIAISON IP CONSULT  CARE MANAGEMENT / SOCIAL WORK IP CONSULT  SMOKING CESSATION PROGRAM IP CONSULT      Immunizations given this encounter     Most Recent Immunizations   Administered Date(s) Administered    Influenza Vaccine 18-64 (Flublok) 04/12/2023    Influenza Vaccine >6 months,quad, PF  02/02/2022    Pneumococcal 23 valent 06/13/2016    TDAP (Adacel,Boostrix) 04/04/2019    Td (Adult), Adsorbed 05/25/1999           Anticoagulation Information      Recent INR results:   Recent Labs   Lab 06/23/24 2026   INR 0.98     Warfarin doses (if applicable) or name of other anticoagulant: Plavix 75 daily and aspirin 325 daily      SIGNIFICANT IMAGING FINDINGS     Results for orders placed or performed during the hospital encounter of 06/23/24   CT Head w/o Contrast    Impression    IMPRESSION:   HEAD CT:  1.  No CT evidence for acute intracranial process.  2.  Brain atrophy and presumed chronic microvascular ischemic changes as above.    HEAD CTA:  1.  Limited exam secondary to suboptimal timing of contrast bolus.  2.  Suspected occlusion of the origin anterior division right middle cerebral artery.    NECK CTA:  1.  Approximately 50-70% stenosis of the right ICA origin.  2.  Less than 50% stenosis of the left ICA origin.    C PERFUSION:  No focal perfusion abnormality. Question accuracy of the examination secondary to timing of contrast bolus.    Dr. Canales discussed the head CT results with Dr. Mccarty at 8:29 PM and CTA head and neck results in perfusion results with Dr. Mccarty at 8:48 PM central time 6/23/2024.     CTA Head Neck with Contrast    Impression    IMPRESSION:   HEAD CT:  1.  No CT evidence for acute intracranial process.  2.  Brain atrophy and presumed chronic microvascular ischemic changes as above.    HEAD CTA:  1.  Limited exam secondary to suboptimal timing of contrast bolus.  2.  Suspected occlusion of the origin anterior division right middle cerebral artery.    NECK CTA:  1.  Approximately 50-70% stenosis of the right ICA origin.  2.  Less than 50% stenosis of the left ICA origin.    C PERFUSION:  No focal perfusion abnormality. Question accuracy of the examination secondary to timing of contrast bolus.    Dr. Canales discussed the head CT results with Dr. Mccarty at 8:29 PM and CTA  head and neck results in perfusion results with Dr. Mccarty at 8:48 PM central time 6/23/2024.     CT Head Perfusion w Contrast - For Tier 2 Stroke    Impression    IMPRESSION:   HEAD CT:  1.  No CT evidence for acute intracranial process.  2.  Brain atrophy and presumed chronic microvascular ischemic changes as above.    HEAD CTA:  1.  Limited exam secondary to suboptimal timing of contrast bolus.  2.  Suspected occlusion of the origin anterior division right middle cerebral artery.    NECK CTA:  1.  Approximately 50-70% stenosis of the right ICA origin.  2.  Less than 50% stenosis of the left ICA origin.    C PERFUSION:  No focal perfusion abnormality. Question accuracy of the examination secondary to timing of contrast bolus.    Dr. Canales discussed the head CT results with Dr. Mccarty at 8:29 PM and CTA head and neck results in perfusion results with Dr. Mccarty at 8:48 PM central time 6/23/2024.     MR Brain w/o Contrast    Impression    IMPRESSION:  HEAD MRI:   1.  Small areas subacute ischemia lateral left thalamus and subcortical white matter anterior left frontal lobe.  2.  No discrete hemorrhage or midline shift.  3.  Diffuse small vessel ischemic disease much more prominent on the left side. Recommend clinical correlation with left carotid artery disease.    HEAD MRA:   1.  No discrete vessel occlusion, significant stenosis, aneurysm or high flow vascular malformation involving the arteries of the [place].    2.  Findings were communicated to Dr. Mccarty at 11:03 pm on 6/23/2024   MRA Brain ([place]) wo Contrast    Impression    IMPRESSION:  HEAD MRI:   1.  Small areas subacute ischemia lateral left thalamus and subcortical white matter anterior left frontal lobe.  2.  No discrete hemorrhage or midline shift.  3.  Diffuse small vessel ischemic disease much more prominent on the left side. Recommend clinical correlation with left carotid artery disease.    HEAD MRA:   1.  No discrete  vessel occlusion, significant stenosis, aneurysm or high flow vascular malformation involving the arteries of the [place].    2.  Findings were communicated to Dr. Mccarty at 11:03 pm on 2024   Echocardiogram Complete - For age > 60 yrs   Result Value Ref Range    LVEF  55-60%        Recent Results (from the past 4320 hour(s))   Echocardiogram Complete - For age > 60 yrs   Result Value    LVEF  55-60%    [facility]    [id number]  [id number]  [id number]  [id number]^LEONA^ESTEFANÍA^[place] [address]     Name: BHARAT KWOK  MRN: [id number]  : 1965  Study Date: 2024 10:22 AM  Age: 58 yrs  Gender: Male  Patient Location: [facility]  Reason For Study: Cerebrovascular Incident  Ordering Physician: ESTEFANÍA DELGADILLO  Performed By: SUNIL     BSA: 2.1 m2  Height: 67 in  Weight: 225 lb  HR: 83  BP: 143/66 mmHg  ______________________________________________________________________________  Procedure  Complete Portable Bubble Echo Adult. Definity (NDC #81688-487) given  intravenously.  ______________________________________________________________________________  Interpretation Summary     Left ventricular size, wall motion and function are normal. The ejection  fraction is 55-60%.  Normal right ventricle size and systolic function.  The left atrium is mildly dilated.  A contrast injection (Bubble Study) was performed that was negative for flow  across the interatrial septum.  No hemodynamically significant valvular abnormalities on 2D or color flow  imaging. The study was technically difficult.  ______________________________________________________________________________  Left Ventricle  Left ventricular size, wall motion and function are normal. The ejection  fraction is 55-60%. There is mild concentric left ventricular hypertrophy.  Left ventricular diastolic function is abnormal. No regional wall motion  abnormalities noted.     Right Ventricle  Normal right  ventricle size and systolic function.     Atria  The left atrium is mildly dilated. Right atrial size is normal. There is no  color Doppler evidence of an atrial shunt. A contrast injection (Bubble Study)  was performed that was negative for flow across the interatrial septum.     Mitral Valve  Mitral valve leaflets appear normal. There is no evidence of mitral stenosis  or clinically significant mitral regurgitation.     Tricuspid Valve  Tricuspid valve leaflets appear normal. There is no evidence of tricuspid  stenosis or clinically significant tricuspid regurgitation.     Aortic Valve  There is moderate trileaflet aortic sclerosis.     Pulmonic Valve  The pulmonic valve is not well seen, but is grossly normal. This degree of  valvular regurgitation is within normal limits.     Vessels  The aorta root is normal. Normal size ascending aorta. Inferior vena cava not  well visualized for estimation of right atrial pressure.     Pericardium  There is no pericardial effusion.     ______________________________________________________________________________  MMode/2D Measurements & Calculations  RVDd: 3.3 cm  IVSd: 1.4 cm  LVIDd: 4.4 cm  LVIDs: 2.6 cm  LVPWd: 1.3 cm  FS: 40.2 %     LV mass(C)d: 226.0 grams  LV mass(C)dI: 106.3 grams/m2  Ao root diam: 3.2 cm  LA dimension: 4.2 cm  asc Aorta Diam: 2.8 cm  LA/Ao: 1.3  LVOT diam: 2.1 cm  LVOT area: 3.5 cm2  Ao root diam index Ht(cm/m): 1.9  Ao root diam index BSA (cm/m2): 1.5  Asc Ao diam index BSA (cm/m2): 1.3  Asc Ao diam index Ht(cm/m): 1.6  EF Biplane: 57.3 %  LA Volume Indexed (AL/bp): 24.7 ml/m2     RWT: 0.61  TAPSE: 2.0 cm     Time Measurements  MM HR: 83.0 BPM     Doppler Measurements & Calculations  MV E max ken: 83.1 cm/sec  MV A max ken: 128.5 cm/sec  MV E/A: 0.65  MV dec time: 0.13 sec  LV V1 max P.0 mmHg  LV V1 max: 99.5 cm/sec  LV V1 VTI: 18.0 cm  SV(LVOT): 63.6 ml  SI(LVOT): 29.9 ml/m2  PA acc time: 0.11 sec  E/E' av.9  Lateral E/e': 9.2  Medial  E/e': 14.7  RV S [place]: 12.9 cm/sec     ______________________________________________________________________________  Report approved by: Huong Queen 06/24/2024 01:17 PM             SIGNIFICANT LABORATORY FINDINGS     Most Recent 3 CBC's:  Recent Labs   Lab Test 06/24/24  0552 06/23/24 2026 01/28/22 0101   WBC 9.4 10.6 14.8*   HGB 13.0* 13.0* 11.9*   MCV 90 90 91    303 349     Most Recent 3 BMP's:  Recent Labs   Lab Test 06/24/24  1604 06/24/24  0949 06/24/24  0744 06/24/24 0552 06/23/24 2104 06/23/24 2026 06/23/24 2003 06/06/24  1004   NA  --   --   --  141  --  141  --  139   POTASSIUM  --   --   --  4.3  --  4.2  --  4.7   CHLORIDE  --   --   --  107  --  106  --  101   CO2  --   --   --  24  --  25  --  21*   BUN  --   --   --  16.9  --  19.1  --  23.9*   CR  --   --   --  1.52*  --  1.75*  --  1.85*   ANIONGAP  --   --   --  10  --  10  --  17*   LENNY  --   --   --  9.6  --  9.7  --  10.0   * 116* 110* 110*   < > 132*   < > 138*    < > = values in this interval not displayed.     Most Recent 2 LFT's:  Recent Labs   Lab Test 06/06/24  1004 04/12/23  0732   AST 32 34   ALT 31 29   ALKPHOS 98 88   BILITOTAL 0.4 0.9     Most Recent 3 INR's:  Recent Labs   Lab Test 06/23/24 2026 01/28/22  0101   INR 0.98 0.98     Most Recent Cholesterol Panel:  Recent Labs   Lab Test 06/23/24  2334   CHOL 198   *   HDL 38*   TRIG 162*     7-Day Micro Results       No results found for the last 168 hours.          Most Recent TSH and T4:  Recent Labs   Lab Test 06/24/24  0552   TSH 3.33     Most Recent Hemoglobin A1c:  Recent Labs   Lab Test 06/06/24  1004   A1C 6.9*     Most Recent 6 glucoses:  Recent Labs   Lab Test 06/24/24  1604 06/24/24  0949 06/24/24  0744 06/24/24  0552 06/24/24  0012 06/23/24  2104   * 116* 110* 110* 114* 114*     Most Recent ESR & CRP:No lab results found.  Troponin T, High Sensitivity   Date Value Ref Range Status   06/23/2024 34 (H) <=22 ng/L Final     Comment:      Either a High Sensitivity Troponin T baseline (0 hours) value = 100 ng/L, or an increase in High Sensitivity Troponin T = 7 ng/L at 2 hours compared to 0 hours (2-0 hours), suggests myocardial injury, and urgent clinical attention is required.    If the 2-0 hours increase is <7 ng/L, a High Sensitivity Troponin T result above gender-specific reference ranges warrants further evaluation.   Recommendations for further evaluation include correlation with clinical decision-making tool (e.g., HEART), a 3rd High Sensitivity Troponin T test 2 hours after the 2nd (a 20% change from baseline would represent concern), admission for observation, close PCC/cardiology follow-up, or urgent outpatient provocative testing.   06/23/2024 32 (H) <=22 ng/L Final     Comment:     Either a High Sensitivity Troponin T baseline (0 hours) value = 100 ng/L, or an increase in High Sensitivity Troponin T = 7 ng/L at 2 hours compared to 0 hours (2-0 hours), suggests myocardial injury, and urgent clinical attention is required.    If the 2-0 hours increase is <7 ng/L, a High Sensitivity Troponin T result above gender-specific reference ranges warrants further evaluation.   Recommendations for further evaluation include correlation with clinical decision-making tool (e.g., HEART), a 3rd High Sensitivity Troponin T test 2 hours after the 2nd (a 20% change from baseline would represent concern), admission for observation, close PCC/cardiology follow-up, or urgent outpatient provocative testing.   06/23/2024 33 (H) <=22 ng/L Final     Comment:     Either a High Sensitivity Troponin T baseline (0 hours) value = 100 ng/L, or an increase in High Sensitivity Troponin T = 7 ng/L at 2 hours compared to 0 hours (2-0 hours), suggests myocardial injury, and urgent clinical attention is required.    If the 2-0 hours increase is <7 ng/L, a High Sensitivity Troponin T result above gender-specific reference ranges warrants further evaluation.    Recommendations for further evaluation include correlation with clinical decision-making tool (e.g., HEART), a 3rd High Sensitivity Troponin T test 2 hours after the 2nd (a 20% change from baseline would represent concern), admission for observation, close PCC/cardiology follow-up, or urgent outpatient provocative testing.      7-Day Micro Results       No results found for the last 168 hours.              Discharge Orders        Adult Sleep Eval & Management Referral      Reason for your hospital stay    Left thalamic stroke     Follow-up and recommended labs and tests     Patient Follow-up    - in the next 1-2 week(s) with PCP               - Discuss consideration for mental health referral   - in 6-8 weeks with general neurology or stroke KATIE ([phone]) (ordered)  - Continue to follow with your established vascular surgery team   - 15 day cardiac event monitor (Zio Patch) to be mailed to patient, (ordered)   - Referral to sleep medicine to evaluate for TYREL (ordered)  -Recheck lipid and hepatic panel in 1 month.     Activity    Your activity upon discharge: activity as tolerated     When to contact your care team    Call your primary doctor if you have any of the following:  increased shortness of breath, increased swelling, increased pain, or return of garbled speech, confusion or extremity weakness.     Monitor and record    blood glucose 4 times a day, before meals and at bedtime     Discharge Instructions    Smoking cessation strongly encouraged for both the patient and all household members.     Diet    Follow this diet upon discharge: Orders Placed This Encounter      Moderate Consistent Carb (60 g CHO per Meal) Diet     Stroke Hospital Follow Up (for neurologist use only)    [facility] will call you to coordinate care as prescribed by your provider. If you don t hear from a representative within 2 business days, please call [phone].       ZIO PATCH MAIL OUT       Examination   Physical  Exam   Temp:  [97.7  F (36.5  C)-98  F (36.7  C)] 98  F (36.7  C)  Pulse:  [] 93  Resp:  [16-26] 19  BP: (125-196)/(66-99) 125/71  SpO2:  [95 %-98 %] 95 %  Wt Readings from Last 4 Encounters:   06/23/24 102.2 kg (225 lb 6.4 oz)   06/06/24 103.6 kg (228 lb 8 oz)   04/12/23 104.8 kg (231 lb 1.6 oz)   04/20/22 98 kg (216 lb)       Please see EMR for more detailed significant labs, imaging, consultant notes etc.    I, Himanshu Nichols MD, personally saw the patient today and spent greater than 30 minutes discharging this patient.    Himanshu Nichols MD  [facility]    CC:Kylie Armijo

## 2024-06-24 NOTE — PROGRESS NOTES
[facility] MEDICINE  PROGRESS NOTE     Code Status: Full Code       Identification/Summary:   Jt Venegas is a 58 year old male PMH significant for left CVA 2022, left carotid endarterectomy, IDDM 2, hyperlipidemia, HTN, CKD 3B, GERD, active tobacco use and depression.  6/23 at 1300 hrs. was noting right-sided weakness and slurred speech.  Presented to the ED at 1945 hrs.  Stroke code activated. CT head without contrast negative for bleed, CTA head with suspected occlusion of the origin anterior division right middle cerebral artery.  Neck CTA with approximately 50 to 75% stenosis of the right ICA, left ICA with less than 50% stenosis.  MRI/MRA head with small areas of subacute ischemia lateral left thalamus and subcortical white matter anterior left frontal lobe, head MRA no discrete vessel occlusion, significant stenosis, nor aneurysm.  EKG was sinus tachycardia.  Appreciate stroke neurology recommendations.  6/24 patient's symptoms have resolved.  Echocardiogram, lipid panel and TSH pending.    Assessment and Plan:    Left thalamic stroke  History of previous left CVA  History of left carotid endarterectomy 2022  -Has been on aspirin and Plavix, statin dose was decreased due to headaches.  Denies missing any medications.  Notes a history of fatigue.  Denies any palpitations.  -Appreciate stroke neurology consult  -Increase aspirin daily to 325.  -Continue Plavix 75 mg daily  -Monitor on telemetry.  -Echocardiogram and TSH pending.  May need Holter monitor.    Hyperlipidemia  Had decreased his Lipitor secondary to headaches and on 6/14 given a prescription for Crestor 40 mg daily.  6/6/2024-cholesterol 297, , HDL 48, triglycerides 225.  Continue Crestor 40 mg daily at this time.  Recheck lipid panel pending.     Insulin-dependent diabetes type II  A1c 6.9.  Continue home Lantus 40 units every morning.  At this time hold metformin 1000 every morning.  Follow glucose  "readings 4 times a day.  Utilize insulin sliding scale.    Essential hypertension  Continue home lisinopril 40 mg daily    Ongoing tobacco use  Using 1/2 pack/day.   Continue Wellbutrin  mg daily.  Utilize nicotine patch.    Chronic kidney disease 3B  -Admission creatinine 1.75 similar to creatinine 2 weeks ago of 1.85.  6/24 creatinine 1.52.  No further checks indicated.    GERD  Not on a PPI at this time.    Anticoagulation   Continue aspirin 325 and Plavix 75 daily.    COVID-19 PCR not tested    Fluids: Saline lock  Pain meds: Tylenol as needed  Therapy: Speech therapy pending  Sy:Not present  Lines: None       Current Diet  Orders Placed This Encounter      Combination Diet Moderate Consistent Carb (60 g CHO per Meal) Diet; 2 gm NA Diet    Supplements  None    Barriers to Discharge: Echocardiogram, TSH, neuro telemetry reevaluation    Disposition: Potential discharge late today or versus 6/25  Medically Ready for Discharge: Anticipated Tomorrow       Clinically Significant Risk Factors Present on Admission                # Drug Induced Platelet Defect: home medication list includes an antiplatelet medication   # Hypertension: Noted on problem list            # DMII: A1C = 6.9 % (Ref range: 0.0 - 5.6 %) within past 6 months    # Obesity: Estimated body mass index is 34.48 kg/m  as calculated from the following:    Height as of 6/6/24: 1.722 m (5' 7.8\").    Weight as of this encounter: 102.2 kg (225 lb 6.4 oz).              Interval History/Subjective:  Patient's symptoms have resolved.  Doing well.  No chest pain.  No shortness of breath.  Denies any history of palpitations.  Has noted significant fatigue recently.  Admits that he is still smoking 1/2 pack/day cigarettes.  Tells me his wife was trying to get him to come in when he was having his symptoms but delayed for unclear reasons.  Questions answered to verbalized satisfaction.        Physical Exam/Objective:  Temp:  [97.7  F (36.5  C)-98  F (36.7 "  C)] 97.7  F (36.5  C)  Pulse:  [] 87  Resp:  [16-26] 22  BP: (143-196)/(66-99) 143/66  SpO2:  [95 %-98 %] 96 %  Wt Readings from Last 4 Encounters:   06/23/24 102.2 kg (225 lb 6.4 oz)   06/06/24 103.6 kg (228 lb 8 oz)   04/12/23 104.8 kg (231 lb 1.6 oz)   04/20/22 98 kg (216 lb)     Body mass index is 34.48 kg/m .    Constitutional: awake, alert, cooperative, no apparent distress, and appears stated age and moderately obese  ENT: Normocephalic, without obvious abnormality, atraumatic, external ears without lesions, oral pharynx with moist mucous membranes, tonsils without erythema or exudates, gums normal and good dentition.  Respiratory: No increased work of breathing, good air exchange, clear to auscultation bilaterally, no crackles or wheezing  Cardiovascular: Normal apical impulse, regular rate and rhythm, normal S1 and S2, no S3 or S4, and no murmur noted  GI: No scars, normal bowel sounds, soft, non-distended, non-tender, no masses palpated, no hepatosplenomegally  Skin: normal skin color, texture, turgor, no redness, warmth, or swelling, and no rashes  Musculoskeletal: There is no redness, warmth, or swelling of the joints.  Motor strength is 5 out of 5 all extremities bilaterally.  Tone is normal. no lower extremity pitting edema present  Neurologic: Cranial nerves II-XII are grossly intact. Sensory:  Sensory intact  Neuropsychiatric: General: normal, calm, and normal eye contact Level of consciousness: alert / normal Affect: normal Orientation: oriented to self, place, time and situation Memory and insight: normal, memory for past and recent events intact, and thought process normal      Medications:   Personally Reviewed.  Medications   Current Facility-Administered Medications   Medication Dose Route Frequency Provider Last Rate Last Admin     Current Facility-Administered Medications   Medication Dose Route Frequency Provider Last Rate Last Admin    aspirin (ASA) EC tablet 325 mg  325 mg Oral  Daily Kiel Soler DO   325 mg at 06/24/24 0745    atorvastatin (LIPITOR) tablet 80 mg  80 mg Oral QPM Kiel Soler DO   80 mg at 06/24/24 0021    buPROPion (WELLBUTRIN XL) 24 hr tablet 150 mg  150 mg Oral QAM Kiel Soler, DO   150 mg at 06/24/24 0745    clopidogrel (PLAVIX) tablet 75 mg  75 mg Oral Daily Kiel Soler, DO   75 mg at 06/24/24 0745    insulin aspart (NovoLOG) injection (RAPID ACTING)  1-7 Units Subcutaneous TID AC Kiel Soler DO        insulin aspart (NovoLOG) injection (RAPID ACTING)  1-5 Units Subcutaneous At Bedtime Kiel Soler DO        insulin glargine (LANTUS PEN) injection 40 Units  40 Units Subcutaneous QAM AC Kiel Soler DO        lisinopril (ZESTRIL) tablet 40 mg  40 mg Oral Daily Kiel Soler,    40 mg at 06/24/24 0745    nicotine (NICODERM CQ) 21 MG/24HR 24 hr patch 1 patch  1 patch Transdermal Daily Kiel Soler DO   1 patch at 06/24/24 0020    sodium chloride (PF) 0.9% PF flush 3 mL  3 mL Intracatheter Q8H Kiel Soler,    3 mL at 06/24/24 0022       Data reviewed today: I personally reviewed all new medications, labs, imaging/diagnostics reports over the past 24 hours. Pertinent findings include:    Imaging:   Recent Results (from the past 24 hour(s))   CT Head w/o Contrast    Narrative    EXAM: CTA HEAD NECK W CONTRAST, CT HEAD W/O CONTRAST, CT HEAD PERFUSION W CONTRAST  LOCATION: [facility]  DATE: 6/23/2024    INDICATION: Right-sided weakness and slurred speech. Code Stroke to evaluate for potential thrombolysis and thrombectomy. PLEASE READ IMMEDIATELY.  COMPARISON: None.  TECHNIQUE: Head and neck CT angiogram with IV contrast. Noncontrast head CT followed by axial helical CT images of the head and neck vessels obtained during the arterial phase of intravenous contrast administration. Axial 2D reconstructed images and   multiplanar 3D MIP reconstructed images of the head and neck vessels were performed by the technologist.  Additional CT cerebral perfusion was performed utilizing a second contrast bolus. Perfusion data were post processed with generation of standard   perfusion maps and estimation of ischemic/infarcted volumes utilizing standard threshold values. Dose reduction techniques were used. All stenosis measurements made according to NASCET criteria unless otherwise specified.  CONTRAST: 75ml Isovue 370 (accession [id number]), 75ml Isovue 370 (accession [id number]), 100ml Isovue 370 (accession [id number])    FINDINGS:   NONCONTRAST HEAD CT:   INTRACRANIAL CONTENTS: No intracranial hemorrhage, extraaxial collection, or mass effect.  No CT evidence of acute infarct. Mild presumed chronic small vessel ischemic changes. Chronic left parietal cortical infarct is similar prior. Mild generalized   volume loss. No hydrocephalus.     VISUALIZED ORBITS/SINUSES/MASTOIDS: No intraorbital abnormality. No paranasal sinus mucosal disease. No middle ear or mastoid effusion.    BONES/SOFT TISSUES: No acute abnormality.    HEAD CTA: Evaluation is degraded secondary to suboptimal timing of contrast bolus.  ANTERIOR CIRCULATION: There are nonstenotic atherosclerotic calcifications of bilateral carotid siphons. The middle cerebral artery origins are patent bilaterally. There is suspected occlusion at the proximal M2 anterior division right middle cerebral   artery. Anterior cerebral arteries are widely patent. No aneurysm, or high flow vascular malformation. Standard [place] anatomy.    POSTERIOR CIRCULATION: No stenosis/occlusion, aneurysm, or high flow vascular malformation. Dominant right and smaller left vertebral artery contribute to a normal basilar artery.     DURAL VENOUS SINUSES: Expected enhancement of the major dural venous sinuses.    NECK CTA:  RIGHT CAROTID: Atherosclerotic plaque results in 50-70% stenosis in the right ICA. No dissection.    LEFT CAROTID: Atherosclerotic plaque results in less than 50% stenosis in the  left ICA. No dissection.    VERTEBRAL ARTERIES: No focal stenosis or dissection. Dominant right and smaller left vertebral arteries.    AORTIC ARCH: Classic aortic arch anatomy with no significant stenosis at the origin of the great vessels.    NONVASCULAR STRUCTURES: Unremarkable.    CT PERFUSION:  PERFUSION MAPS: There appears to be symmetrical. Perfusion throughout without focal perfusion abnormality. However, Tmax appears diffusely diminished likely secondary to poor timing of contrast bolus.    RAPID ANALYSIS:  CBF<30%: 0 mL  Tmax>6sec: 0 mL  Mismatch volume: 0 mL  Mismatch ratio: None      Impression    IMPRESSION:   HEAD CT:  1.  No CT evidence for acute intracranial process.  2.  Brain atrophy and presumed chronic microvascular ischemic changes as above.    HEAD CTA:  1.  Limited exam secondary to suboptimal timing of contrast bolus.  2.  Suspected occlusion of the origin anterior division right middle cerebral artery.    NECK CTA:  1.  Approximately 50-70% stenosis of the right ICA origin.  2.  Less than 50% stenosis of the left ICA origin.    C PERFUSION:  No focal perfusion abnormality. Question accuracy of the examination secondary to timing of contrast bolus.    Dr. Canales discussed the head CT results with Dr. Mccarty at 8:29 PM and CTA head and neck results in perfusion results with Dr. Mccarty at 8:48 PM central time 6/23/2024.     CTA Head Neck with Contrast    Narrative    EXAM: CTA HEAD NECK W CONTRAST, CT HEAD W/O CONTRAST, CT HEAD PERFUSION W CONTRAST  LOCATION: [facility]  DATE: 6/23/2024    INDICATION: Right-sided weakness and slurred speech. Code Stroke to evaluate for potential thrombolysis and thrombectomy. PLEASE READ IMMEDIATELY.  COMPARISON: None.  TECHNIQUE: Head and neck CT angiogram with IV contrast. Noncontrast head CT followed by axial helical CT images of the head and neck vessels obtained during the arterial phase of intravenous contrast administration.  Axial 2D reconstructed images and   multiplanar 3D MIP reconstructed images of the head and neck vessels were performed by the technologist. Additional CT cerebral perfusion was performed utilizing a second contrast bolus. Perfusion data were post processed with generation of standard   perfusion maps and estimation of ischemic/infarcted volumes utilizing standard threshold values. Dose reduction techniques were used. All stenosis measurements made according to NASCET criteria unless otherwise specified.  CONTRAST: 75ml Isovue 370 (accession [id number]), 75ml Isovue 370 (accession [id number]), 100ml Isovue 370 (accession [id number])    FINDINGS:   NONCONTRAST HEAD CT:   INTRACRANIAL CONTENTS: No intracranial hemorrhage, extraaxial collection, or mass effect.  No CT evidence of acute infarct. Mild presumed chronic small vessel ischemic changes. Chronic left parietal cortical infarct is similar prior. Mild generalized   volume loss. No hydrocephalus.     VISUALIZED ORBITS/SINUSES/MASTOIDS: No intraorbital abnormality. No paranasal sinus mucosal disease. No middle ear or mastoid effusion.    BONES/SOFT TISSUES: No acute abnormality.    HEAD CTA: Evaluation is degraded secondary to suboptimal timing of contrast bolus.  ANTERIOR CIRCULATION: There are nonstenotic atherosclerotic calcifications of bilateral carotid siphons. The middle cerebral artery origins are patent bilaterally. There is suspected occlusion at the proximal M2 anterior division right middle cerebral   artery. Anterior cerebral arteries are widely patent. No aneurysm, or high flow vascular malformation. Standard [place] anatomy.    POSTERIOR CIRCULATION: No stenosis/occlusion, aneurysm, or high flow vascular malformation. Dominant right and smaller left vertebral artery contribute to a normal basilar artery.     DURAL VENOUS SINUSES: Expected enhancement of the major dural venous sinuses.    NECK CTA:  RIGHT CAROTID: Atherosclerotic plaque  results in 50-70% stenosis in the right ICA. No dissection.    LEFT CAROTID: Atherosclerotic plaque results in less than 50% stenosis in the left ICA. No dissection.    VERTEBRAL ARTERIES: No focal stenosis or dissection. Dominant right and smaller left vertebral arteries.    AORTIC ARCH: Classic aortic arch anatomy with no significant stenosis at the origin of the great vessels.    NONVASCULAR STRUCTURES: Unremarkable.    CT PERFUSION:  PERFUSION MAPS: There appears to be symmetrical. Perfusion throughout without focal perfusion abnormality. However, Tmax appears diffusely diminished likely secondary to poor timing of contrast bolus.    RAPID ANALYSIS:  CBF<30%: 0 mL  Tmax>6sec: 0 mL  Mismatch volume: 0 mL  Mismatch ratio: None      Impression    IMPRESSION:   HEAD CT:  1.  No CT evidence for acute intracranial process.  2.  Brain atrophy and presumed chronic microvascular ischemic changes as above.    HEAD CTA:  1.  Limited exam secondary to suboptimal timing of contrast bolus.  2.  Suspected occlusion of the origin anterior division right middle cerebral artery.    NECK CTA:  1.  Approximately 50-70% stenosis of the right ICA origin.  2.  Less than 50% stenosis of the left ICA origin.    C PERFUSION:  No focal perfusion abnormality. Question accuracy of the examination secondary to timing of contrast bolus.    Dr. Canales discussed the head CT results with Dr. Mccarty at 8:29 PM and CTA head and neck results in perfusion results with Dr. Mccarty at 8:48 PM central time 6/23/2024.     CT Head Perfusion w Contrast - For Tier 2 Stroke    Narrative    EXAM: CTA HEAD NECK W CONTRAST, CT HEAD W/O CONTRAST, CT HEAD PERFUSION W CONTRAST  LOCATION: [facility]  DATE: 6/23/2024    INDICATION: Right-sided weakness and slurred speech. Code Stroke to evaluate for potential thrombolysis and thrombectomy. PLEASE READ IMMEDIATELY.  COMPARISON: None.  TECHNIQUE: Head and neck CT angiogram with IV  contrast. Noncontrast head CT followed by axial helical CT images of the head and neck vessels obtained during the arterial phase of intravenous contrast administration. Axial 2D reconstructed images and   multiplanar 3D MIP reconstructed images of the head and neck vessels were performed by the technologist. Additional CT cerebral perfusion was performed utilizing a second contrast bolus. Perfusion data were post processed with generation of standard   perfusion maps and estimation of ischemic/infarcted volumes utilizing standard threshold values. Dose reduction techniques were used. All stenosis measurements made according to NASCET criteria unless otherwise specified.  CONTRAST: 75ml Isovue 370 (accession [id number]), 75ml Isovue 370 (accession [id number]), 100ml Isovue 370 (accession [id number])    FINDINGS:   NONCONTRAST HEAD CT:   INTRACRANIAL CONTENTS: No intracranial hemorrhage, extraaxial collection, or mass effect.  No CT evidence of acute infarct. Mild presumed chronic small vessel ischemic changes. Chronic left parietal cortical infarct is similar prior. Mild generalized   volume loss. No hydrocephalus.     VISUALIZED ORBITS/SINUSES/MASTOIDS: No intraorbital abnormality. No paranasal sinus mucosal disease. No middle ear or mastoid effusion.    BONES/SOFT TISSUES: No acute abnormality.    HEAD CTA: Evaluation is degraded secondary to suboptimal timing of contrast bolus.  ANTERIOR CIRCULATION: There are nonstenotic atherosclerotic calcifications of bilateral carotid siphons. The middle cerebral artery origins are patent bilaterally. There is suspected occlusion at the proximal M2 anterior division right middle cerebral   artery. Anterior cerebral arteries are widely patent. No aneurysm, or high flow vascular malformation. Standard [place] anatomy.    POSTERIOR CIRCULATION: No stenosis/occlusion, aneurysm, or high flow vascular malformation. Dominant right and smaller left vertebral artery  contribute to a normal basilar artery.     DURAL VENOUS SINUSES: Expected enhancement of the major dural venous sinuses.    NECK CTA:  RIGHT CAROTID: Atherosclerotic plaque results in 50-70% stenosis in the right ICA. No dissection.    LEFT CAROTID: Atherosclerotic plaque results in less than 50% stenosis in the left ICA. No dissection.    VERTEBRAL ARTERIES: No focal stenosis or dissection. Dominant right and smaller left vertebral arteries.    AORTIC ARCH: Classic aortic arch anatomy with no significant stenosis at the origin of the great vessels.    NONVASCULAR STRUCTURES: Unremarkable.    CT PERFUSION:  PERFUSION MAPS: There appears to be symmetrical. Perfusion throughout without focal perfusion abnormality. However, Tmax appears diffusely diminished likely secondary to poor timing of contrast bolus.    RAPID ANALYSIS:  CBF<30%: 0 mL  Tmax>6sec: 0 mL  Mismatch volume: 0 mL  Mismatch ratio: None      Impression    IMPRESSION:   HEAD CT:  1.  No CT evidence for acute intracranial process.  2.  Brain atrophy and presumed chronic microvascular ischemic changes as above.    HEAD CTA:  1.  Limited exam secondary to suboptimal timing of contrast bolus.  2.  Suspected occlusion of the origin anterior division right middle cerebral artery.    NECK CTA:  1.  Approximately 50-70% stenosis of the right ICA origin.  2.  Less than 50% stenosis of the left ICA origin.    C PERFUSION:  No focal perfusion abnormality. Question accuracy of the examination secondary to timing of contrast bolus.    Dr. Canales discussed the head CT results with Dr. Mccarty at 8:29 PM and CTA head and neck results in perfusion results with Dr. Mccarty at 8:48 PM central time 6/23/2024.     MR Brain w/o Contrast    Narrative    EXAM: MR BRAIN W/O CONTRAST, MRA BRAIN ([place]) W/O CONTRAST  LOCATION: [facility]  DATE: 6/23/2024    INDICATION: Right-sided weakness and slurred speech.  COMPARISON:  6/23/2024.    TECHNIQUE:   1) Routine multiplanar multisequence head MRI without intravenous contrast.  2) 3D time-of-flight head MRA without intravenous contrast.    FINDINGS:  HEAD MRI:  INTRACRANIAL CONTENTS: On the diffusion-weighted images there is a small focus of ischemia involving the lateral left thalamus along with a few smaller foci of ischemia subcortical white matter mid right frontal lobe. There is no other focus of ischemia   or restricted diffusion. There is no extra-axial fluid collection or acute intraparenchymal hemorrhage. There is no discrete mass lesion or midline shift. There are appropriate flow voids within the cavernous portions of the internal carotid arteries and   the basilar artery. The areas of ischemia on the diffusion-weighted images are visualized on the FLAIR and T2-weighted images consistent with being subacute in nature. Also on the FLAIR and T2-weighted images are multiple foci of high signal within the   periventricular and subcortical white matter much more prominent on the left side. The ventricular system, basal cisterns and the cortical sulci are within normal limits for the patient's age.    There is no evidence of cerebellar tonsillar ectopia. The corpus callosum and the sella region have appropriate configuration and signal for the patient's age. The corpus callosum and the sella region have appropriate configuration and signal intensity   for the patient's age. The orbit regions are unremarkable. There is no significant paranasal sinus disease. The mastoid air cells and the middle ear regions are clear.     HEAD MRA:   ANTERIOR CIRCULATION: No stenosis/occlusion, aneurysm, or high flow vascular malformation. Standard [place] anatomy.    POSTERIOR CIRCULATION: No stenosis/occlusion, aneurysm, or high flow vascular malformation. Right vertebral artery dominant but both vertebral arteries connect up to the basilar artery.       Impression    IMPRESSION:  HEAD MRI:    1.  Small areas subacute ischemia lateral left thalamus and subcortical white matter anterior left frontal lobe.  2.  No discrete hemorrhage or midline shift.  3.  Diffuse small vessel ischemic disease much more prominent on the left side. Recommend clinical correlation with left carotid artery disease.    HEAD MRA:   1.  No discrete vessel occlusion, significant stenosis, aneurysm or high flow vascular malformation involving the arteries of the [place].    2.  Findings were communicated to Dr. Mccarty at 11:03 pm on 6/23/2024   MRA Brain ([place]) wo Contrast    Narrative    EXAM: MR BRAIN W/O CONTRAST, MRA BRAIN ([place]) W/O CONTRAST  LOCATION: [facility]  DATE: 6/23/2024    INDICATION: Right-sided weakness and slurred speech.  COMPARISON: 6/23/2024.    TECHNIQUE:   1) Routine multiplanar multisequence head MRI without intravenous contrast.  2) 3D time-of-flight head MRA without intravenous contrast.    FINDINGS:  HEAD MRI:  INTRACRANIAL CONTENTS: On the diffusion-weighted images there is a small focus of ischemia involving the lateral left thalamus along with a few smaller foci of ischemia subcortical white matter mid right frontal lobe. There is no other focus of ischemia   or restricted diffusion. There is no extra-axial fluid collection or acute intraparenchymal hemorrhage. There is no discrete mass lesion or midline shift. There are appropriate flow voids within the cavernous portions of the internal carotid arteries and   the basilar artery. The areas of ischemia on the diffusion-weighted images are visualized on the FLAIR and T2-weighted images consistent with being subacute in nature. Also on the FLAIR and T2-weighted images are multiple foci of high signal within the   periventricular and subcortical white matter much more prominent on the left side. The ventricular system, basal cisterns and the cortical sulci are within normal limits for the  patient's age.    There is no evidence of cerebellar tonsillar ectopia. The corpus callosum and the sella region have appropriate configuration and signal for the patient's age. The corpus callosum and the sella region have appropriate configuration and signal intensity   for the patient's age. The orbit regions are unremarkable. There is no significant paranasal sinus disease. The mastoid air cells and the middle ear regions are clear.     HEAD MRA:   ANTERIOR CIRCULATION: No stenosis/occlusion, aneurysm, or high flow vascular malformation. Standard [place] of [place] anatomy.    POSTERIOR CIRCULATION: No stenosis/occlusion, aneurysm, or high flow vascular malformation. Right vertebral artery dominant but both vertebral arteries connect up to the basilar artery.       Impression    IMPRESSION:  HEAD MRI:   1.  Small areas subacute ischemia lateral left thalamus and subcortical white matter anterior left frontal lobe.  2.  No discrete hemorrhage or midline shift.  3.  Diffuse small vessel ischemic disease much more prominent on the left side. Recommend clinical correlation with left carotid artery disease.    HEAD MRA:   1.  No discrete vessel occlusion, significant stenosis, aneurysm or high flow vascular malformation involving the arteries of the [place].    2.  Findings were communicated to Dr. Mccarty at 11:03 pm on 6/23/2024       Labs:  MRA Brain ([place]) wo Contrast   Final Result   IMPRESSION:   HEAD MRI:    1.  Small areas subacute ischemia lateral left thalamus and subcortical white matter anterior left frontal lobe.   2.  No discrete hemorrhage or midline shift.   3.  Diffuse small vessel ischemic disease much more prominent on the left side. Recommend clinical correlation with left carotid artery disease.      HEAD MRA:    1.  No discrete vessel occlusion, significant stenosis, aneurysm or high flow vascular malformation involving the arteries of the [place].      2.  Findings  were communicated to Dr. Mccarty at 11:03 pm on 6/23/2024      MR Brain w/o Contrast   Final Result   IMPRESSION:   HEAD MRI:    1.  Small areas subacute ischemia lateral left thalamus and subcortical white matter anterior left frontal lobe.   2.  No discrete hemorrhage or midline shift.   3.  Diffuse small vessel ischemic disease much more prominent on the left side. Recommend clinical correlation with left carotid artery disease.      HEAD MRA:    1.  No discrete vessel occlusion, significant stenosis, aneurysm or high flow vascular malformation involving the arteries of the [place].      2.  Findings were communicated to Dr. Mccarty at 11:03 pm on 6/23/2024      CT Head Perfusion w Contrast - For Tier 2 Stroke   Final Result   IMPRESSION:    HEAD CT:   1.  No CT evidence for acute intracranial process.   2.  Brain atrophy and presumed chronic microvascular ischemic changes as above.      HEAD CTA:   1.  Limited exam secondary to suboptimal timing of contrast bolus.   2.  Suspected occlusion of the origin anterior division right middle cerebral artery.      NECK CTA:   1.  Approximately 50-70% stenosis of the right ICA origin.   2.  Less than 50% stenosis of the left ICA origin.      C PERFUSION:   No focal perfusion abnormality. Question accuracy of the examination secondary to timing of contrast bolus.      Dr. Canales discussed the head CT results with Dr. Mccarty at 8:29 PM and CTA head and neck results in perfusion results with Dr. Mccarty at 8:48 PM central time 6/23/2024.         CTA Head Neck with Contrast   Final Result   IMPRESSION:    HEAD CT:   1.  No CT evidence for acute intracranial process.   2.  Brain atrophy and presumed chronic microvascular ischemic changes as above.      HEAD CTA:   1.  Limited exam secondary to suboptimal timing of contrast bolus.   2.  Suspected occlusion of the origin anterior division right middle cerebral artery.      NECK CTA:   1.  Approximately 50-70%  stenosis of the right ICA origin.   2.  Less than 50% stenosis of the left ICA origin.      C PERFUSION:   No focal perfusion abnormality. Question accuracy of the examination secondary to timing of contrast bolus.      Dr. Canales discussed the head CT results with Dr. Mccarty at 8:29 PM and CTA head and neck results in perfusion results with Dr. Mccarty at 8:48 PM central time 6/23/2024.         CT Head w/o Contrast   Final Result   IMPRESSION:    HEAD CT:   1.  No CT evidence for acute intracranial process.   2.  Brain atrophy and presumed chronic microvascular ischemic changes as above.      HEAD CTA:   1.  Limited exam secondary to suboptimal timing of contrast bolus.   2.  Suspected occlusion of the origin anterior division right middle cerebral artery.      NECK CTA:   1.  Approximately 50-70% stenosis of the right ICA origin.   2.  Less than 50% stenosis of the left ICA origin.      C PERFUSION:   No focal perfusion abnormality. Question accuracy of the examination secondary to timing of contrast bolus.      Dr. Canales discussed the head CT results with Dr. Mccarty at 8:29 PM and CTA head and neck results in perfusion results with Dr. Mccarty at 8:48 PM central time 6/23/2024.         Echocardiogram Complete - For age > 60 yrs    (Results Pending)   Echocardiogram Complete    (Results Pending)     Recent Results (from the past 24 hour(s))   Basic metabolic panel    Collection Time: 06/23/24  8:26 PM   Result Value Ref Range    Sodium 141 135 - 145 mmol/L    Potassium 4.2 3.4 - 5.3 mmol/L    Chloride 106 98 - 107 mmol/L    Carbon Dioxide (CO2) 25 22 - 29 mmol/L    Anion Gap 10 7 - 15 mmol/L    Urea Nitrogen 19.1 8.0 - 23.0 mg/dL    Creatinine 1.75 (H) 0.67 - 1.17 mg/dL    GFR Estimate 44 (L) >60 mL/min/1.73m2    Calcium 9.7 8.6 - 10.0 mg/dL    Glucose 132 (H) 70 - 99 mg/dL   INR    Collection Time: 06/23/24  8:26 PM   Result Value Ref Range    INR 0.98 0.85 - 1.15   Partial thromboplastin time    Collection  Time: 06/23/24  8:26 PM   Result Value Ref Range    aPTT 24 22 - 38 Seconds   Troponin T, High Sensitivity    Collection Time: 06/23/24  8:26 PM   Result Value Ref Range    Troponin T, High Sensitivity 33 (H) <=22 ng/L   CBC with platelets and differential    Collection Time: 06/23/24  8:26 PM   Result Value Ref Range    WBC Count 10.6 4.0 - 11.0 10e3/uL    RBC Count 4.22 (L) 4.40 - 5.90 10e6/uL    Hemoglobin 13.0 (L) 13.3 - 17.7 g/dL    Hematocrit 38.1 (L) 40.0 - 53.0 %    MCV 90 78 - 100 fL    MCH 30.8 26.5 - 33.0 pg    MCHC 34.1 31.5 - 36.5 g/dL    RDW 12.9 10.0 - 15.0 %    Platelet Count 303 150 - 450 10e3/uL    % Neutrophils 61 %    % Lymphocytes 27 %    % Monocytes 7 %    % Eosinophils 5 %    % Basophils 1 %    % Immature Granulocytes 1 %    NRBCs per 100 WBC 0 <1 /100    Absolute Neutrophils 6.5 1.6 - 8.3 10e3/uL    Absolute Lymphocytes 2.8 0.8 - 5.3 10e3/uL    Absolute Monocytes 0.7 0.0 - 1.3 10e3/uL    Absolute Eosinophils 0.5 0.0 - 0.7 10e3/uL    Absolute Basophils 0.1 0.0 - 0.2 10e3/uL    Absolute Immature Granulocytes 0.1 <=0.4 10e3/uL    Absolute NRBCs 0.0 10e3/uL   Glucose by meter    Collection Time: 06/23/24  9:04 PM   Result Value Ref Range    GLUCOSE BY METER POCT 114 (H) 70 - 99 mg/dL   Troponin T, High Sensitivity    Collection Time: 06/23/24 10:50 PM   Result Value Ref Range    Troponin T, High Sensitivity 32 (H) <=22 ng/L   Troponin T, High Sensitivity    Collection Time: 06/23/24 11:35 PM   Result Value Ref Range    Troponin T, High Sensitivity 34 (H) <=22 ng/L   Glucose by meter    Collection Time: 06/24/24 12:12 AM   Result Value Ref Range    GLUCOSE BY METER POCT 114 (H) 70 - 99 mg/dL   CBC with platelets    Collection Time: 06/24/24  5:52 AM   Result Value Ref Range    WBC Count 9.4 4.0 - 11.0 10e3/uL    RBC Count 4.27 (L) 4.40 - 5.90 10e6/uL    Hemoglobin 13.0 (L) 13.3 - 17.7 g/dL    Hematocrit 38.5 (L) 40.0 - 53.0 %    MCV 90 78 - 100 fL    MCH 30.4 26.5 - 33.0 pg    MCHC 33.8 31.5 -  36.5 g/dL    RDW 13.0 10.0 - 15.0 %    Platelet Count 301 150 - 450 10e3/uL   Basic metabolic panel    Collection Time: 06/24/24  5:52 AM   Result Value Ref Range    Sodium 141 135 - 145 mmol/L    Potassium 4.3 3.4 - 5.3 mmol/L    Chloride 107 98 - 107 mmol/L    Carbon Dioxide (CO2) 24 22 - 29 mmol/L    Anion Gap 10 7 - 15 mmol/L    Urea Nitrogen 16.9 8.0 - 23.0 mg/dL    Creatinine 1.52 (H) 0.67 - 1.17 mg/dL    GFR Estimate 52 (L) >60 mL/min/1.73m2    Calcium 9.6 8.6 - 10.0 mg/dL    Glucose 110 (H) 70 - 99 mg/dL   Glucose by meter    Collection Time: 06/24/24  7:44 AM   Result Value Ref Range    GLUCOSE BY METER POCT 110 (H) 70 - 99 mg/dL       Pending Labs:  Unresulted Labs Ordered in the Past 30 Days of this Admission       Date and Time Order Name Status Description    6/24/2024  8:48 AM TSH with free T4 reflex In process     6/23/2024 11:20 PM Lipid panel reflex to direct LDL: Non-fasting In process               Himanshu Nichols MD  [facility]  Phone: #[phone]

## 2024-06-24 NOTE — PROGRESS NOTES
Connected with nursing staff, pt is at baseline with ADLs and is not appropriate for skilled OT services at this time.  Will defer OT evaluation and D/C current OT orders. Thank you.    6/24/2024 by Isabelle Medina, OTR, OTR/L

## 2024-06-24 NOTE — CONSULTS
Care Management Initial Consult    General Information  Assessment completed with: Patient,    Type of CM/SW Visit: Initial Assessment    Primary Care Provider verified and updated as needed: Yes   Readmission within the last 30 days: no previous admission in last 30 days      Reason for Consult: discharge planning  Advance Care Planning: Advance Care Planning Reviewed: no concerns identified          Communication Assessment  Patient's communication style: spoken language (English or Bilingual)    Hearing Difficulty or Deaf: no   Wear Glasses or Blind: yes    Cognitive  Cognitive/Neuro/Behavioral: all, WDL  Level of Consciousness: alert  Arousal Level: opens eyes spontaneously  Orientation: oriented x 4     Best Language: 0 - No aphasia  Speech: logical, spontaneous, clear    Living Environment:   People in home: spouse     Current living Arrangements: house      Able to return to prior arrangements: yes       Family/Social Support:  Care provided by: self  Provides care for: no one  Marital Status:   Wife          Description of Support System: Supportive         Current Resources:   Patient receiving home care services: No     Community Resources: None  Equipment currently used at home: none  Supplies currently used at home: None    Employment/Financial:  Employment Status: unemployed        Financial Concerns: insurance, none (Medicaid lapsed)   Referral to Financial Worker: Yes       Does the patient's insurance plan have a 3 day qualifying hospital stay waiver?  No    Lifestyle & Psychosocial Needs:  Social Determinants of Health     Food Insecurity: Low Risk  (6/6/2024)    Food Insecurity     Within the past 12 months, did you worry that your food would run out before you got money to buy more?: No     Within the past 12 months, did the food you bought just not last and you didn t have money to get more?: No   Depression: Not at risk (6/6/2024)    PHQ-2     PHQ-2 Score: 2   Housing Stability: Low Risk   (6/6/2024)    Housing Stability     Do you have housing? : Yes     Are you worried about losing your housing?: No   Tobacco Use: High Risk (6/6/2024)    Patient History     Smoking Tobacco Use: Every Day     Smokeless Tobacco Use: Never     Passive Exposure: Not on file   Financial Resource Strain: Low Risk  (6/6/2024)    Financial Resource Strain     Within the past 12 months, have you or your family members you live with been unable to get utilities (heat, electricity) when it was really needed?: No   Alcohol Use: Not on file   Transportation Needs: Low Risk  (6/6/2024)    Transportation Needs     Within the past 12 months, has lack of transportation kept you from medical appointments, getting your medicines, non-medical meetings or appointments, work, or from getting things that you need?: No   Physical Activity: Sufficiently Active (6/6/2024)    Exercise Vital Sign     Days of Exercise per Week: 7 days     Minutes of Exercise per Session: 130 min   Interpersonal Safety: Low Risk  (6/6/2024)    Interpersonal Safety     Do you feel physically and emotionally safe where you currently live?: Yes     Within the past 12 months, have you been hit, slapped, kicked or otherwise physically hurt by someone?: No     Within the past 12 months, have you been humiliated or emotionally abused in other ways by your partner or ex-partner?: No   Stress: No Stress Concern Present (6/6/2024)    [employer] - Occupational Stress Questionnaire     Feeling of Stress : Not at all   Social Connections: Unknown (6/6/2024)    Social Connection and Isolation Panel [NHANES]     Frequency of Communication with Friends and Family: Not on file     Frequency of Social Gatherings with Friends and Family: Twice a week     Attends Alevism Services: Not on file     Active Member of Clubs or Organizations: Not on file     Attends Club or Organization Meetings: Not on file     Marital Status: Not on file   Health  Literacy: Not on file       Functional Status:  Prior to admission patient needed assistance:   Dependent ADLs:: Independent  Dependent IADLs:: Independent       Mental Health Status:  Mental Health Status: No Current Concerns       Chemical Dependency Status:  Chemical Dependency Status: No Current Concerns             Values/Beliefs:  Spiritual, Cultural Beliefs, Cheondoism Practices, Values that affect care: no               Additional Information:  Jt Venegas is a(n) 58 year old year old male who presented with Stroke (cerebrum) (H)  Acute CVA (cerebrovascular accident) (H).     CM met with Patient at bedside. Introduced self and role. Patient assisted with completing assessment. Patient lives in a(n) house with his spouse. Patient gets assistance with Independent Independent. Anticipated that patient will discharge to home.    Financial referral sent. Patient denies any further needs at this time.    Contacts:  Extended Emergency Contact Information  Primary Emergency Contact: Kassie Venegas  Address: [address] [place] [address]  Home Phone: [phone]  Relation: Spouse    CM Team will continue to follow and assist with discharge planning.    Melisa Shaver  6/24/2024  1:45 PM

## 2024-06-24 NOTE — PLAN OF CARE
"  Problem: Adult Inpatient Plan of Care  Goal: Plan of Care Review  Description: The Plan of Care Review/Shift note should be completed every shift.  The Outcome Evaluation is a brief statement about your assessment that the patient is improving, declining, or no change.  This information will be displayed automatically on your shift  note.  Outcome: Met  Goal: Patient-Specific Goal (Individualized)  Description: You can add care plan individualizations to a care plan. Examples of Individualization might be:  \"Parent requests to be called daily at 9am for status\", \"I have a hard time hearing out of my right ear\", or \"Do not touch me to wake me up as it startles  me\".  Outcome: Met  Goal: Absence of Hospital-Acquired Illness or Injury  Outcome: Met  Goal: Optimal Comfort and Wellbeing  Outcome: Met  Goal: Readiness for Transition of Care  Outcome: Met   Goal Outcome Evaluation:    Pt alert and oriented and able to make all needs known.  Cleared for discharge by Neuro and [facility].  To follow-up with PCP and Neurology, as well as, patient to have holter monitor and set-up an appointment for outpatient sleep study.  Wife at the bedside and aware of the recommendations.  Wife to assist patient with appointments.  All questions were answered.  All next doses of meds reviewed.  Education regarding stroke, signs of a stroke, low-carb diet & smoking cessation added to AVS.  IV removed and all belongings sent with the patient.  Pt walked off the unit with staff.  Wife to transport the patient home.                      "

## 2024-06-25 ENCOUNTER — ORDERS ONLY (AUTO-RELEASED) (OUTPATIENT)
Dept: EMERGENCY MEDICINE | Facility: CLINIC | Age: 59
End: 2024-06-25

## 2024-06-25 DIAGNOSIS — I63.9 ACUTE CVA (CEREBROVASCULAR ACCIDENT) (H): ICD-10-CM

## 2024-06-25 ASSESSMENT — ACTIVITIES OF DAILY LIVING (ADL): ADLS_ACUITY_SCORE: 21

## 2024-06-26 ENCOUNTER — PATIENT OUTREACH (OUTPATIENT)
Dept: CARE COORDINATION | Facility: CLINIC | Age: 59
End: 2024-06-26

## 2024-06-26 NOTE — PROGRESS NOTES
Connected Care Resource Center Contact  [facility]/Voicemail     Clinical Data: Post-Discharge Outreach     Outreach attempted x 2.  Left message on patient's voicemail, providing [facility]'s central phone number of [phone] ([phone]) for questions/concerns and/or to schedule an appt with an [facility] provider, if they do not have a PCP.      Plan:  [facility] will do no further outreaches at this time.       ALEX Chua  Connected Care Resource Center, [facility]    *Connected Care Resource Team does NOT follow patient ongoing. Referrals are identified based on internal discharge reports and the outreach is to ensure patient has an understanding of their discharge instructions.

## 2024-07-08 ENCOUNTER — OFFICE VISIT (OUTPATIENT)
Dept: FAMILY MEDICINE | Facility: CLINIC | Age: 59
End: 2024-07-08

## 2024-07-08 ENCOUNTER — MYC MEDICAL ADVICE (OUTPATIENT)
Dept: FAMILY MEDICINE | Facility: CLINIC | Age: 59
End: 2024-07-08

## 2024-07-08 VITALS
HEIGHT: 68 IN | HEART RATE: 108 BPM | SYSTOLIC BLOOD PRESSURE: 122 MMHG | OXYGEN SATURATION: 94 % | TEMPERATURE: 98.2 F | BODY MASS INDEX: 33.93 KG/M2 | DIASTOLIC BLOOD PRESSURE: 78 MMHG | WEIGHT: 223.9 LBS | RESPIRATION RATE: 20 BRPM

## 2024-07-08 DIAGNOSIS — E78.5 HYPERLIPIDEMIA, UNSPECIFIED HYPERLIPIDEMIA TYPE: ICD-10-CM

## 2024-07-08 DIAGNOSIS — I10 ESSENTIAL HYPERTENSION: ICD-10-CM

## 2024-07-08 DIAGNOSIS — I63.512 ACUTE ISCHEMIC CEREBROVASCULAR ACCIDENT (CVA) INVOLVING LEFT MIDDLE CEREBRAL ARTERY TERRITORY (H): ICD-10-CM

## 2024-07-08 DIAGNOSIS — E11.9 INSULIN DEPENDENT TYPE 2 DIABETES MELLITUS (H): Primary | ICD-10-CM

## 2024-07-08 DIAGNOSIS — F32.1 MAJOR DEPRESSIVE DISORDER, SINGLE EPISODE, MODERATE (H): ICD-10-CM

## 2024-07-08 DIAGNOSIS — F17.200 TOBACCO DEPENDENCE SYNDROME: ICD-10-CM

## 2024-07-08 DIAGNOSIS — G47.19 EXCESSIVE DAYTIME SLEEPINESS: ICD-10-CM

## 2024-07-08 DIAGNOSIS — Z79.4 INSULIN DEPENDENT TYPE 2 DIABETES MELLITUS (H): Primary | ICD-10-CM

## 2024-07-08 PROCEDURE — 99495 TRANSJ CARE MGMT MOD F2F 14D: CPT | Performed by: FAMILY MEDICINE

## 2024-07-08 ASSESSMENT — PAIN SCALES - GENERAL: PAINLEVEL: NO PAIN (0)

## 2024-07-08 NOTE — TELEPHONE ENCOUNTER
Patient Quality Outreach    Patient is due for the following:   Diabetes -  Eye Exam and Foot Exam  Colon Cancer Screening  Physical Preventive Adult Physical    Next Steps:   Schedule a Adult Preventative    Type of outreach:    Sent [employer] message.    Next Steps:  Reach out within 90 days via Phone.    Max number of attempts reached: No. Will try again in 90 days if patient still on fail list.    Questions for provider review:    None           Salvador Tee RN

## 2024-07-08 NOTE — PROGRESS NOTES
"  Assessment & Plan     Acute ischemic cerebrovascular accident (CVA) involving left middle cerebral artery territory (H)  He remains on aspirin and clopidogrel, statin, good blood pressure control, continued efforts at adequate diabetes control.  Referral is made to neurology for follow-up visit 6 to 8 weeks after hospitalization.  Because he is also experiencing excessive daytime sleepiness, referral is made to sleep clinic.  Advised that he complete his Zio patch as ordered.  Advised tobacco cessation, he declines assistance with this.  - Adult Neurology  Referral; Future  - Adult Sleep Eval & Management  Referral; Future    Excessive daytime sleepiness  Referral made to sleep clinic for assessment for sleep apnea which could contribute to daytime sleepiness and to risk of stroke.  - Adult Sleep Eval & Management  Referral; Future    Tobacco dependence syndrome  Advised cessation.  He continues to work on this and declines assistance.    Insulin dependent type 2 diabetes mellitus (H)  Encourage continued efforts at diabetes management, A1c at goal.    Essential hypertension  Blood pressure adequately controlled and meeting goal.    Hyperlipidemia, unspecified hyperlipidemia type  Encourage continued compliance with rosuvastatin.  Advised that he take this at bedtime rather than the morning.  Will check future fasting lipids.    Major depressive disorder, single episode, moderate (H)  Patient is not interested in treatment of this and declines further evaluation or treatment.        MED REC REQUIRED  Post Medication Reconciliation Status: discharge medications reconciled, continue medications without change  BMI  Estimated body mass index is 34.3 kg/m  as calculated from the following:    Height as of this encounter: 1.721 m (5' 7.75\").    Weight as of this encounter: 101.6 kg (223 lb 14.4 oz).             Carolann Waters is a 58 year old, presenting for the following health " issues:  Hospital F/U (stroke)    Seen in clinic today accompanied by his wife for follow-up of hospitalization 6/23/2024 through 6/24/2024.  Patient had acute onset of right-sided weakness and slurred speech, upon encouragement of his wife he presented to emergency room a few hours later.  MRI showed new subacute areas of left lateral thalamic ischemia and some subcortical white matter anterior left frontal lobe as well.  Seen by neurology.  Echocardiogram including bubble study was negative.  Telemetry was unremarkable.  He is to have a Zio patch.  Was started on full-strength aspirin and clopidogrel.  He was instructed to follow-up with neurology as an outpatient, has not yet scheduled.  Remains on rosuvastatin as was started just prior to his hospitalization.  Continues to smoke, with smoke about half pack per day but has cut down to about 3 cigarettes/day.  He resumed his Lantus and metformin and his blood sugars have been doing well.  Has a little bit of weakness or clumsiness in his right side but it is minimal, not significantly impacting his current level of function.  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  He feels that he is doing fine emotionally is not interested in additional treatment of depression.           Hospital Follow-up Visit:    Hospital/Nursing Home/IP Rehab Facility: [facility]  Date of Admission: 6-23-24  Date of Discharge: 6-24-24  Reason(s) for Admission: Stroke  Was the patient in the ICU or did the patient experience delirium during hospitalization?  No  Do you have any other stressors you would like to discuss with your provider? No    Problems taking medications regularly:  None  Medication changes since discharge: None  Problems adhering to non-medication therapy:  None    Summary of hospitalization:  [facility] discharge summary reviewed  Diagnostic Tests/Treatments reviewed.  Follow up needed: none  Other Healthcare Providers Involved in Patient s Care:         None  Update since discharge: improved.         Plan of care communicated with patient and family                     Objective    /78   Pulse 108   Temp 98.2  F (36.8  C) (Temporal)   Resp 20   Ht 1.721 m (5' 7.75\")   Wt 101.6 kg (223 lb 14.4 oz)   SpO2 94%   BMI 34.30 kg/m    Body mass index is 34.3 kg/m .  Physical Exam   Alert and pleasant.  Pupils equal, round, reactive to light.  Face moves symmetrically.  Mild balance difficulties when standing on his right foot and when squatting to the ground and then standing back up but otherwise good strength.  Heart with regular rate and rhythm, heart rate about 80 to 85 bpm on my exam.  Lungs clear and well aerated.  Extremities without edema.            Signed Electronically by: Kylie Armijo MD    "

## 2024-07-08 NOTE — PATIENT INSTRUCTIONS
Switch rosuvastatin (Crestor) 40 mg to take it at bedtime.  It is most effective when taken at bedtime.    Schedule visits with the sleep clinic to assess for sleep apnea and with the neurology clinic in 6 weeks.  Continue to work on quitting smoking.  Be sure to complete the heart monitor patch as ordered.

## 2024-07-09 ENCOUNTER — PATIENT OUTREACH (OUTPATIENT)
Dept: CARE COORDINATION | Facility: CLINIC | Age: 59
End: 2024-07-09

## 2024-07-09 NOTE — PROGRESS NOTES
Clinic Care Coordination Contact  Community Health Worker Initial Outreach    CHW Initial Information Gathering:  Referral Source: PCP  Preferred Hospital: [facility]  [phone]  Current living arrangement:: I live in a private home with spouse  Type of residence:: Other (mobile home)  Community Resources: None  Supplies Currently Used at Home: Diabetic Supplies  Equipment Currently Used at Home: none  Informal Support system:: Family  No PCP office visit in Past Year: No  Transportation means:: Accessible car  CHW Additional Questions  MyChart active?: Yes  Patient sent Social Determinants of Health questionnaire?: Yes    Patient accepts CC: Yes. Patient scheduled for assessment with CCC SHELLY Urbina on 7/10/24 at 3:30PM. Patient noted desire to discuss insurance assistance.     CHW Note:  CHW contacted patient's spouse Kassie regarding referral that was placed for CCC. CHW introduced self and role of CCC.  Kassie reported she would like assistance navigating health insurance at this time.  CHW scheduled Kassie with CCC SHELLY Urbina on 7/10/24 at 3:30PM for health insurance assistance/navigation.     Order Questions    Question Answer   Reason for Referral: Financial Support   Financial Support: Insurance   Clinical Staff have discussed the Care Coordination Referral with the patient and/or caregiver: Yes       Margret Spann  Community Health Worker   [facility]  Clinic Care Coordination  [facility]   [email or web address]  Office: [phone]

## 2024-07-10 ENCOUNTER — PATIENT OUTREACH (OUTPATIENT)
Dept: CARE COORDINATION | Facility: CLINIC | Age: 59
End: 2024-07-10

## 2024-07-10 NOTE — PROGRESS NOTES
[facility]/Voicemail    Clinical Data: Care Coordinator Outreach    Outreach Documentation Number of Outreach Attempt   6/6/2024   2:43 PM 1   6/7/2024  10:17 AM 2   7/10/2024   3:37 PM 1       Left message on patient's voicemail with call back information and requested return call.  Saw My Chart message after leaving  that he had to cancel appt and wishes to reschedule to 7-11. Sent him message offering to reschedule.      Plan: Care Coordinator will try to reach patient again in 3-5 business days.    Ciara Bowman,   [facility]  [phone]

## 2024-07-12 ENCOUNTER — PATIENT OUTREACH (OUTPATIENT)
Dept: CARE COORDINATION | Facility: CLINIC | Age: 59
End: 2024-07-12

## 2024-07-12 NOTE — PROGRESS NOTES
[facility]/Voicemail    Clinical Data: Care Coordinator Outreach    Outreach Documentation Number of Outreach Attempt   6/6/2024   2:43 PM 1   6/7/2024  10:17 AM 2   7/10/2024   3:37 PM 1   7/12/2024   3:30 PM 1       Left message on  wife's  voicemail with call back information and requested return call.  Sent My Chart message as well.     Plan: Care Coordinator will try to reach patient again in 1-2 business days.    Ciara Bowman,   [facility]  [phone]

## 2024-07-16 ENCOUNTER — PATIENT OUTREACH (OUTPATIENT)
Dept: CARE COORDINATION | Facility: CLINIC | Age: 59
End: 2024-07-16

## 2024-07-16 NOTE — LETTER
[facility] CARE COORDINATION  [facility]    July 16, 2024    Jt Venegas  [address] [place] [id number]      Dear Jt,    I am a clinic care coordinator who works with Kylie Armijo MD with the [facility]. I wanted to thank you for spending the time to talk with me.  Below is a description of clinic care coordination and how I can further assist you.       The clinic care coordination team is made up of a registered nurse, , financial resource worker and community health worker who understand the health care system. The goal of clinic care coordination is to help you manage your health and improve access to the health care system. Our team works alongside your provider to assist you in determining your health and social needs. We can help you obtain health care and community resources, providing you with necessary information and education. We can work with you through any barriers and develop a care plan that helps coordinate and strengthen the communication between you and your care team.  Our services are voluntary and are offered without charge to you personally.    Please feel free to contact me with any questions or concerns regarding care coordination and what we can offer.      We are focused on providing you with the highest-quality healthcare experience possible.    Sincerely,     Ciara Bowman,   [facility]  [phone]
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
## 2024-07-20 ENCOUNTER — HEALTH MAINTENANCE LETTER (OUTPATIENT)
Age: 59
End: 2024-07-20

## 2024-09-28 ENCOUNTER — HEALTH MAINTENANCE LETTER (OUTPATIENT)
Age: 59
End: 2024-09-28

## 2024-12-08 PROBLEM — I63.9 ACUTE CVA (CEREBROVASCULAR ACCIDENT) (H): Status: RESOLVED | Noted: 2024-06-23 | Resolved: 2024-12-08

## 2024-12-08 PROBLEM — I63.9 STROKE (CEREBRUM) (H): Status: RESOLVED | Noted: 2024-06-23 | Resolved: 2024-12-08

## 2024-12-11 DIAGNOSIS — I10 ESSENTIAL HYPERTENSION: ICD-10-CM

## 2024-12-11 DIAGNOSIS — Z79.4 INSULIN DEPENDENT TYPE 2 DIABETES MELLITUS (H): ICD-10-CM

## 2024-12-11 DIAGNOSIS — E11.9 INSULIN DEPENDENT TYPE 2 DIABETES MELLITUS (H): ICD-10-CM

## 2024-12-11 NOTE — TELEPHONE ENCOUNTER
Pending Prescriptions:                       Disp   Refills    lisinopril (ZESTRIL) 40 MG tablet         90 tab*1            Sig: Take 1 tablet (40 mg) by mouth daily.    metFORMIN (GLUCOPHAGE XR) 500 MG 24 hr ta*180 ta*1            Sig: Take 2 tablets (1,000 mg) by mouth daily (with           breakfast).

## 2024-12-12 RX ORDER — LISINOPRIL 40 MG/1
40 TABLET ORAL DAILY
Qty: 90 TABLET | Refills: 1 | Status: SHIPPED | OUTPATIENT
Start: 2024-12-12

## 2024-12-12 RX ORDER — METFORMIN HYDROCHLORIDE 500 MG/1
1000 TABLET, EXTENDED RELEASE ORAL
Qty: 180 TABLET | Refills: 1 | Status: SHIPPED | OUTPATIENT
Start: 2024-12-12

## 2024-12-12 NOTE — TELEPHONE ENCOUNTER
Future Appointments 12/12/2024 - 6/10/2025        Date Visit Type Length Department Provider     1/15/2025  7:10 AM PREVENTATIVE ADULT 30 min [facility] FAMILY MEDICINE/OB Kylie Armijo MD    Location Instructions:     [facility] is in [address] of the [facility] at [address] This is near the [facility] [address] exit off of [address]. From the exit, turn east on [address], then north on [address]. Free lot parking is available. Through the main entrance, [address] is to the right on the first floor.

## 2024-12-12 NOTE — TELEPHONE ENCOUNTER
Please ensure patient has a visit scheduled for diabetes follow-up--- he cancelled the appointment he had with me this week.   Thanks!  DIAZ

## 2025-01-15 ENCOUNTER — OFFICE VISIT (OUTPATIENT)
Dept: FAMILY MEDICINE | Facility: CLINIC | Age: 60
End: 2025-01-15

## 2025-01-15 VITALS
DIASTOLIC BLOOD PRESSURE: 80 MMHG | TEMPERATURE: 98 F | SYSTOLIC BLOOD PRESSURE: 122 MMHG | HEART RATE: 96 BPM | OXYGEN SATURATION: 98 % | RESPIRATION RATE: 20 BRPM | BODY MASS INDEX: 33.03 KG/M2 | HEIGHT: 69 IN | WEIGHT: 223 LBS

## 2025-01-15 DIAGNOSIS — Z79.4 TYPE 2 DIABETES MELLITUS WITH DIABETIC POLYNEUROPATHY, WITH LONG-TERM CURRENT USE OF INSULIN (H): Primary | ICD-10-CM

## 2025-01-15 DIAGNOSIS — F17.200 TOBACCO DEPENDENCE SYNDROME: ICD-10-CM

## 2025-01-15 DIAGNOSIS — Z86.73 HISTORY OF STROKE: ICD-10-CM

## 2025-01-15 DIAGNOSIS — E78.5 HYPERLIPIDEMIA, UNSPECIFIED HYPERLIPIDEMIA TYPE: ICD-10-CM

## 2025-01-15 DIAGNOSIS — F32.5 MAJOR DEPRESSIVE DISORDER, SINGLE EPISODE, IN REMISSION: ICD-10-CM

## 2025-01-15 DIAGNOSIS — E11.42 TYPE 2 DIABETES MELLITUS WITH DIABETIC POLYNEUROPATHY, WITH LONG-TERM CURRENT USE OF INSULIN (H): Primary | ICD-10-CM

## 2025-01-15 DIAGNOSIS — I63.232 STENOSIS OF LEFT INTERNAL CAROTID ARTERY WITH CEREBRAL INFARCTION (H): ICD-10-CM

## 2025-01-15 DIAGNOSIS — Z12.11 SCREEN FOR COLON CANCER: ICD-10-CM

## 2025-01-15 DIAGNOSIS — I10 ESSENTIAL HYPERTENSION: ICD-10-CM

## 2025-01-15 LAB
ANION GAP SERPL CALCULATED.3IONS-SCNC: 10 MMOL/L (ref 7–15)
BUN SERPL-MCNC: 24.4 MG/DL (ref 8–23)
CALCIUM SERPL-MCNC: 9.6 MG/DL (ref 8.8–10.4)
CHLORIDE SERPL-SCNC: 105 MMOL/L (ref 98–107)
CREAT SERPL-MCNC: 1.71 MG/DL (ref 0.67–1.17)
EGFRCR SERPLBLD CKD-EPI 2021: 45 ML/MIN/1.73M2
EST. AVERAGE GLUCOSE BLD GHB EST-MCNC: 171 MG/DL
GLUCOSE SERPL-MCNC: 216 MG/DL (ref 70–99)
HBA1C MFR BLD: 7.6 % (ref 0–5.6)
HCO3 SERPL-SCNC: 24 MMOL/L (ref 22–29)
HOLD SPECIMEN: NORMAL
POTASSIUM SERPL-SCNC: 4.4 MMOL/L (ref 3.4–5.3)
SODIUM SERPL-SCNC: 139 MMOL/L (ref 135–145)

## 2025-01-15 PROCEDURE — 90471 IMMUNIZATION ADMIN: CPT | Performed by: FAMILY MEDICINE

## 2025-01-15 PROCEDURE — 99214 OFFICE O/P EST MOD 30 MIN: CPT | Mod: 25 | Performed by: FAMILY MEDICINE

## 2025-01-15 PROCEDURE — 83036 HEMOGLOBIN GLYCOSYLATED A1C: CPT | Performed by: FAMILY MEDICINE

## 2025-01-15 PROCEDURE — 96127 BRIEF EMOTIONAL/BEHAV ASSMT: CPT | Performed by: FAMILY MEDICINE

## 2025-01-15 PROCEDURE — G2211 COMPLEX E/M VISIT ADD ON: HCPCS | Performed by: FAMILY MEDICINE

## 2025-01-15 PROCEDURE — 90673 RIV3 VACCINE NO PRESERV IM: CPT | Performed by: FAMILY MEDICINE

## 2025-01-15 PROCEDURE — 80048 BASIC METABOLIC PNL TOTAL CA: CPT | Performed by: FAMILY MEDICINE

## 2025-01-15 PROCEDURE — 36415 COLL VENOUS BLD VENIPUNCTURE: CPT | Performed by: FAMILY MEDICINE

## 2025-01-15 RX ORDER — CLOPIDOGREL BISULFATE 75 MG/1
75 TABLET ORAL DAILY
Qty: 90 TABLET | Refills: 4 | Status: SHIPPED | OUTPATIENT
Start: 2025-01-15

## 2025-01-15 RX ORDER — LISINOPRIL 40 MG/1
40 TABLET ORAL DAILY
Qty: 90 TABLET | Refills: 4 | Status: SHIPPED | OUTPATIENT
Start: 2025-01-15

## 2025-01-15 RX ORDER — METFORMIN HYDROCHLORIDE 500 MG/1
1000 TABLET, EXTENDED RELEASE ORAL
Qty: 180 TABLET | Refills: 4 | Status: SHIPPED | OUTPATIENT
Start: 2025-01-15

## 2025-01-15 RX ORDER — BUPROPION HYDROCHLORIDE 150 MG/1
150 TABLET ORAL EVERY MORNING
Qty: 90 TABLET | Refills: 4 | Status: SHIPPED | OUTPATIENT
Start: 2025-01-15

## 2025-01-15 RX ORDER — ROSUVASTATIN CALCIUM 40 MG/1
40 TABLET, COATED ORAL DAILY
Qty: 90 TABLET | Refills: 4 | Status: SHIPPED | OUTPATIENT
Start: 2025-01-15

## 2025-01-15 SDOH — HEALTH STABILITY: PHYSICAL HEALTH: ON AVERAGE, HOW MANY DAYS PER WEEK DO YOU ENGAGE IN MODERATE TO STRENUOUS EXERCISE (LIKE A BRISK WALK)?: 1 DAY

## 2025-01-15 ASSESSMENT — SOCIAL DETERMINANTS OF HEALTH (SDOH): HOW OFTEN DO YOU GET TOGETHER WITH FRIENDS OR RELATIVES?: ONCE A WEEK

## 2025-01-15 ASSESSMENT — PATIENT HEALTH QUESTIONNAIRE - PHQ9
SUM OF ALL RESPONSES TO PHQ QUESTIONS 1-9: 3
SUM OF ALL RESPONSES TO PHQ QUESTIONS 1-9: 3
10. IF YOU CHECKED OFF ANY PROBLEMS, HOW DIFFICULT HAVE THESE PROBLEMS MADE IT FOR YOU TO DO YOUR WORK, TAKE CARE OF THINGS AT HOME, OR GET ALONG WITH OTHER PEOPLE: NOT DIFFICULT AT ALL

## 2025-01-15 ASSESSMENT — PAIN SCALES - GENERAL: PAINLEVEL_OUTOF10: NO PAIN (0)

## 2025-01-15 NOTE — PATIENT INSTRUCTIONS
Schedule follow-up visit with dermatology.    I recommend you schedule a visit with neurology to discuss stroke prevention, review your medications.    Follow-up with me in 3 months, we will recheck your diabetes at that time.    Work on increasing your intake of protein especially with snacks to help stabilize your daytime blood sugars.  Continue Lantus at current dose.  Let me know if you are developing low sugars.  Work on reintroducing regular physical activity.

## 2025-01-15 NOTE — PROGRESS NOTES
Assessment & Plan     Type 2 diabetes mellitus with diabetic polyneuropathy, with long-term current use of insulin (H)  Worsened control, not meeting goal of A1c less than 7.  I suspect this is from daytime elevation of blood sugars and encouraged him to check his blood sugar in the daytime.  No adjustments made in Lantus since he currently has good control of morning sugars.  Continue metformin at current dose.  Follow-up in 3 months to reassess.  Advised to schedule diabetes eye exam.  - Hemoglobin A1c  - metFORMIN (GLUCOPHAGE XR) 500 MG 24 hr tablet; Take 2 tablets (1,000 mg) by mouth daily (with breakfast).  - insulin glargine (LANTUS PEN) 100 UNIT/ML pen; Inject 40 Units subcutaneously every morning (before breakfast).  - Basic metabolic panel; Future  - Basic metabolic panel    Hyperlipidemia, unspecified hyperlipidemia type  Continue rosuvastatin.  Lipids were checked in June, we will plan to check again at follow-up visit.  - rosuvastatin (CRESTOR) 40 MG tablet; Take 1 tablet (40 mg) by mouth daily.    Hypertension  Able and controlled on lisinopril.  Continue.  Advised efforts at healthy lifestyle habits.  - lisinopril (ZESTRIL) 40 MG tablet; Take 1 tablet (40 mg) by mouth daily.    H/O Lt MCA infaction  Lt ICA stenosis; S/P CEA  He remains on aspirin, clopidogrel, and statin.  Blood pressure control is meeting goals.  No evidence of recurrence.  Ultimately would benefit from seeing neurology, will defer this till next visit.  Advised tobacco cessation.  - clopidogrel (PLAVIX) 75 MG tablet; Take 1 tablet (75 mg) by mouth daily.  - rosuvastatin (CRESTOR) 40 MG tablet; Take 1 tablet (40 mg) by mouth daily.    Major depressive disorder, single episode, in remission (H)  We discussed option of continuing bupropion versus attempt to taper.  Because it has been helpful for quitting smoking, he would like to continue.  Refill provided.  - buPROPion (WELLBUTRIN XL) 150 MG 24 hr tablet; Take 1 tablet (150 mg) by  "mouth every morning.    Screen for colon cancer  Order placed for screening colonoscopy.  - Colonoscopy Screening  Referral; Future    Tobacco dependence syndrome  Advised continued efforts at cessation.  He declines assistance and would like to continue bupropion.            BMI  Estimated body mass index is 32.93 kg/m  as calculated from the following:    Height as of this encounter: 1.753 m (5' 9\").    Weight as of this encounter: 101.2 kg (223 lb).       Counseling  Appropriate preventive services were addressed with this patient via screening, questionnaire, or discussion as appropriate for fall prevention, nutrition, physical activity, Tobacco-use cessation, social engagement, weight loss and cognition.  Checklist reviewing preventive services available has been given to the patient.  Reviewed patient's diet, addressing concerns and/or questions.   He is at risk for lack of exercise and has been provided with information to increase physical activity for the benefit of his well-being.   The patient was instructed to see the dentist every 6 months.           The longitudinal plan of care for the diagnosis(es)/condition(s) as documented were addressed during this visit. Due to the added complexity in care, I will continue to support Jt in the subsequent management and with ongoing continuity of care.    Subjective   Jt is a 59 year old, presenting for the following health issues:  Physical (fasting) and Rash (Leg, comes and goes, itchy)    Initially scheduled for preventive care visit, we elected to for preventive care as it has been quite sometime since we manages chronic conditions, and he is uncertain whether his insurance would cover this retroactively so will defer to a future date.  Here primarily for follow-up of diabetes.  His morning sugars have been in the 80s to 110s, no significant low sugars.  Remains compliant with metformin 1000 mg in the morning, Lantus 40 units daily.  Higher " "doses of metformin have led to GI symptoms.  Admits that he is not doing any dedicated exercise other than cleaning and laundry in the winter.  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  Otherwise feeling well.  Denies any chest pain, palpitations, new neurologic symptoms.  Previous history of stroke, remains on clopidogrel, aspirin, and statin.  Has not seen neurology since his last stroke.  Hypertension has been stable and controlled on lisinopril without side effects.  Continues to smoke but he is down to 5 cigarettes/day, finds the bupropion has been helpful for this, would like to continue.  No issues with depression symptoms, overall feeling like himself.  He has noted a rash on his legs that he like me to look at, slight itching at times.  Using calamine lotion.    States that he tends to snack throughout the day with 1 good meal each day.  His meal usually consist of protein and carbohydrate.  Snacks are incredibly variable but many carbohydrates including chips.  He does not check his blood sugars in the daytime.                       Objective    /80   Pulse 96   Temp 98  F (36.7  C) (Oral)   Resp 20   Ht 1.753 m (5' 9\")   Wt 101.2 kg (223 lb)   SpO2 98%   BMI 32.93 kg/m    Body mass index is 32.93 kg/m .  Physical Exam   Alert and pleasant.  Mucous membranes moist.  Heart with regular and rhythm.  Lungs clear.  Multiple actinic changes noted of upper extremities.  Lower extremities with cracking of skin consistent with very dry skin, no overt rash or dermatitis otherwise.  Diabetic foot exam: normal DP and PT pulses, no trophic changes or ulcerative lesions, and normal sensory exam    Office Visit on 01/15/2025   Component Date Value Ref Range Status    Estimated Average Glucose 01/15/2025 171 (H)  <117 mg/dL Final    Hemoglobin A1C 01/15/2025 7.6 (H)  0.0 - 5.6 % Final    Normal <5.7%   Prediabetes 5.7-6.4%    Diabetes 6.5% or higher     Note: Adopted from ADA " consensus guidelines.    Hold Specimen 01/15/2025 [facility]   Final              Signed Electronically by: Kylie Armijo MD      Prior to immunization administration, verified patients identity using patient s name and date of birth. Please see Immunization Activity for additional information.     Screening Questionnaire for Adult Immunization    Are you sick today?   No   Do you have allergies to medications, food, a vaccine component or latex?   No   Have you ever had a serious reaction after receiving a vaccination?   No   Do you have a long-term health problem with heart, lung, kidney, or metabolic disease (e.g., diabetes), asthma, a blood disorder, no spleen, complement component deficiency, a cochlear implant, or a spinal fluid leak?  Are you on long-term aspirin therapy?   No   Do you have cancer, leukemia, HIV/AIDS, or any other immune system problem?   No   Do you have a parent, brother, or sister with an immune system problem?   No   In the past 3 months, have you taken medications that affect  your immune system, such as prednisone, other steroids, or anticancer drugs; drugs for the treatment of rheumatoid arthritis, Crohn s disease, or psoriasis; or have you had radiation treatments?   No   Have you had a seizure, or a brain or other nervous system problem?   No   During the past year, have you received a transfusion of blood or blood    products, or been given immune (gamma) globulin or antiviral drug?   No   For women: Are you pregnant or is there a chance you could become       pregnant during the next month?   No   Have you received any vaccinations in the past 4 weeks?   No     Immunization questionnaire answers were all negative.      Patient instructed to remain in clinic for 15 minutes afterwards, and to report any adverse reactions.     Screening performed by Maritza Wyatt CMA on 1/15/2025 at 7:50 AM.

## 2025-04-26 ENCOUNTER — HEALTH MAINTENANCE LETTER (OUTPATIENT)
Age: 60
End: 2025-04-26

## 2025-06-19 ENCOUNTER — MYC MEDICAL ADVICE (OUTPATIENT)
Dept: FAMILY MEDICINE | Facility: CLINIC | Age: 60
End: 2025-06-19

## 2025-06-19 NOTE — TELEPHONE ENCOUNTER
Patient Quality Outreach    Patient is due for the following:   Diabetes -  A1C, Eye Exam, and Microalbumin    Action(s) Taken:   Schedule a office visit for diabetic follow up    Type of outreach:    Sent [employer] message.    Questions for provider review:    None         Fabienne LITTLE RN  Chart routed to None.

## 2025-07-17 DIAGNOSIS — Z79.4 TYPE 2 DIABETES MELLITUS WITH OTHER CIRCULATORY COMPLICATION, WITH LONG-TERM CURRENT USE OF INSULIN (H): Primary | ICD-10-CM

## 2025-07-17 DIAGNOSIS — E11.59 TYPE 2 DIABETES MELLITUS WITH OTHER CIRCULATORY COMPLICATION, WITH LONG-TERM CURRENT USE OF INSULIN (H): Primary | ICD-10-CM

## 2025-07-17 RX ORDER — HYDROCHLOROTHIAZIDE 12.5 MG/1
CAPSULE ORAL
Qty: 6 EACH | Refills: 1 | Status: SHIPPED | OUTPATIENT
Start: 2025-07-17

## 2025-08-09 ENCOUNTER — HEALTH MAINTENANCE LETTER (OUTPATIENT)
Age: 60
End: 2025-08-09